# Patient Record
Sex: FEMALE | Race: WHITE | NOT HISPANIC OR LATINO | Employment: FULL TIME | URBAN - METROPOLITAN AREA
[De-identification: names, ages, dates, MRNs, and addresses within clinical notes are randomized per-mention and may not be internally consistent; named-entity substitution may affect disease eponyms.]

---

## 2017-06-29 ENCOUNTER — TRANSFERRED RECORDS (OUTPATIENT)
Dept: HEALTH INFORMATION MANAGEMENT | Facility: CLINIC | Age: 62
End: 2017-06-29

## 2017-07-22 ENCOUNTER — TRANSFERRED RECORDS (OUTPATIENT)
Dept: HEALTH INFORMATION MANAGEMENT | Facility: CLINIC | Age: 62
End: 2017-07-22

## 2017-08-09 ENCOUNTER — TRANSFERRED RECORDS (OUTPATIENT)
Dept: HEALTH INFORMATION MANAGEMENT | Facility: CLINIC | Age: 62
End: 2017-08-09

## 2017-10-10 ENCOUNTER — TRANSFERRED RECORDS (OUTPATIENT)
Dept: HEALTH INFORMATION MANAGEMENT | Facility: CLINIC | Age: 62
End: 2017-10-10

## 2018-09-28 ENCOUNTER — TRANSFERRED RECORDS (OUTPATIENT)
Dept: HEALTH INFORMATION MANAGEMENT | Facility: CLINIC | Age: 63
End: 2018-09-28

## 2019-04-22 ENCOUNTER — TRANSFERRED RECORDS (OUTPATIENT)
Dept: HEALTH INFORMATION MANAGEMENT | Facility: CLINIC | Age: 64
End: 2019-04-22

## 2019-10-28 ENCOUNTER — TRANSFERRED RECORDS (OUTPATIENT)
Dept: HEALTH INFORMATION MANAGEMENT | Facility: CLINIC | Age: 64
End: 2019-10-28

## 2020-04-09 PROBLEM — Z82.0 FAMILY HISTORY OF PARKINSONISM: Status: ACTIVE | Noted: 2020-04-09

## 2020-04-09 PROBLEM — D23.9 DYSPLASTIC NEVI: Status: ACTIVE | Noted: 2018-07-25

## 2020-04-09 PROBLEM — Z82.0 FAMILY HX OF ALS (AMYOTROPHIC LATERAL SCLEROSIS): Status: ACTIVE | Noted: 2020-04-09

## 2020-04-09 PROBLEM — Z81.8 FAMILY HISTORY OF DEMENTIA: Status: ACTIVE | Noted: 2020-04-09

## 2020-04-09 PROBLEM — Z83.79 FAMILY HISTORY OF ULCERATIVE COLITIS: Status: ACTIVE | Noted: 2020-04-09

## 2020-04-09 RX ORDER — SELEGILINE HYDROCHLORIDE 5 MG/1
5 TABLET ORAL
COMMUNITY
Start: 2019-04-22 | End: 2020-04-15

## 2020-04-09 RX ORDER — MAGNESIUM GLYCINATE 100 MG
1 TABLET ORAL
COMMUNITY
End: 2020-04-15

## 2020-04-09 RX ORDER — DULOXETIN HYDROCHLORIDE 60 MG/1
60 CAPSULE, DELAYED RELEASE ORAL DAILY
COMMUNITY
Start: 2019-08-07 | End: 2020-04-15

## 2020-04-09 RX ORDER — ACETAMINOPHEN 500 MG
500 TABLET ORAL
COMMUNITY
End: 2020-04-15

## 2020-04-09 RX ORDER — CARBIDOPA AND LEVODOPA 25; 100 MG/1; MG/1
1 TABLET ORAL
COMMUNITY
Start: 2020-03-09 | End: 2020-04-15

## 2020-04-09 RX ORDER — MELOXICAM 15 MG/1
15 TABLET ORAL
COMMUNITY
Start: 2019-07-05 | End: 2020-04-15

## 2020-04-09 RX ORDER — MECLIZINE HYDROCHLORIDE 25 MG/1
12.5 TABLET ORAL
COMMUNITY
End: 2020-04-15

## 2020-04-09 RX ORDER — GABAPENTIN 300 MG/1
600 CAPSULE ORAL 3 TIMES DAILY
COMMUNITY
Start: 2019-08-07 | End: 2020-04-15

## 2020-04-09 RX ORDER — GLUCOSAMINE/CHONDR SU A SOD 750-600 MG
TABLET ORAL
COMMUNITY
End: 2020-04-15

## 2020-04-09 NOTE — PROGRESS NOTES
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 No clear wearing off. She has had two falls in the past year.  No driving problems. No cognitive problems - slight memory changes. Has occasional constipation.   She has some bladder issues and has not had benefit from mirabregon and were considered and include pTNS, botox. Otherwise okay. Some speech changes - weak and hesitancy and some choking/swallowing problems.     (Z82.0) Family hx of ALS (amyotrophic lateral sclerosis)  - father Singh Biswas  11/[birth year]1933  (Z82.0) Family history of Parkinsonism father  (Z81.8) Family history of dementia father   (Z83.79) Family history of ulcerative colitis father    Plan:    Referral to Pat Stark, Pharm D - April 30 2020  Consider Genetics Consultation with Nadir Kan about MAPT, progranulin, [id number]  Parkinson 101 class with Edith Flynn  PT/Speech  [email or web address]    Talked about rock steady boxing    Had questions on fish oil, duloxetine/rasagiline, etc.     Pushed exercise.    Pain management meds coming from her pcp - for gabapentin and duloxetine      On 15th April time spent with patient from 1130am - 1145am = 15minutes    I have reviewed the note as documented above.  This accurately captures the substance of my conversation with the patient.  Patient contact time  60  minutes. Over 50% of this visit was spent in patient care and care coordination.     Damien  "Le GARCIA      ------------------------------------------------------------------------------------------------------------------------------------------------------------------------    Video-Visit Details    The patient has been notified of following:     \"After a review of the patient s situation, this visit was changed from an in-person visit to a video visit to reduce the risk of COVID-19 exposure.   The patient is being evaluated via a billable video visit.\"    \"This video visit will be conducted via a call between you and your physician/provider. We have found that certain health care needs can be provided without the need for an in-person physical exam.  This service lets us provide the care you need with a video conversation.  If a prescription is necessary we can send it directly to your pharmacy.  If lab work is needed we can place an order for that and you can then stop by our lab to have the test done at a later time.    If during the course of the call the physician/provider feels a video visit is not appropriate, you will not be charged for this service.\"     Patient has given verbal consent for Video visit? Yes    Patient would like the video invitation sent by: Text to cell phone: 0    Type of service:  Video Visit    Video Start Time: 0    Video End Time (time video stopped): 0    Duration:     Originating Location (pt. Location): Home    Distant Location (provider location):  [facility]     Mode of Communication:  Video Conference via Arnoldo Lujan MD      --------------------------------------------------------------------------------------------------------------    Ne Padilla is a 64 year old female who is being evaluated via a billable video visit.      Charts reviewed  Consult from  Images reviewed        I have reviewed and updated the patient's Past Medical History, Social History, Family History and Medication List.    ALLERGIES  Ciprofloxacin; " Hydrocodone-acetaminophen; Animal dander; No clinical screening - see comments; Prochlorperazine; and Cephalexin    Lasts visit details if there was a last visit:     Has seen Dr. Bishop in th past   Diagnosed August 2017  Symptoms bgan 15 to 1  Years prior to that  Left side onset  Right handed -   Officially retiring June 2020    Walks daily  She has been doing toñito warm yoga 2-3 times per week    Does lower back exercises daily    Has back and neck issues and  Taking gabapentin and duloxetine     Antidepressants allowed in all phase III azilect trials  escitalopram (lexapro) < 10mg/day  Citalopram (celexa) < 20mg/day  amitryptyline <50mg/day  Paroxetine <30mg/day  Trazodone <100mg/day  Sertraline <100mg/day    Contraindicated with meperidine, tramadol, methadone, propoxyphene, dextromethorphan, Lyn's Wort, cyclobenzaprine, other Maos    Caution with ciprofloxacin    Side effects: dyskinesias, weight loss, postural hypotension, vomiting, anorexia, constipation, abdominal pain, nausea, dry mouth, rash, abnormal dreams, falls, arthralgias.           Medications     8am noon 330pm 930pm    Acetaminophen tylenol 500mg  As needed       Calcium Carbonate 600 vitamin D 400  1      Carbidopa/levodopa 25/100 sinemet 1  1 1    Cholecalciferol vitamin D3 4000 units  1      Duloxetine cymbalta 60mg    1    Fish oil-3 fatty acids 100mg  1      Gabapentin neurontin 300mg 2  2 2    Glucosamine-chondroitin  1  1    Ibuprofen advil 200mg As needed       Meclizine antivert 25mg    1/2    Meloxicam mobic 15mg        Nonformulary magnesium glycinate 150mg 1  1     Tizanidine zanaflex 4mg     1/2                                                                                              14 Review of systems  are negative except for   Patient Active Problem List   Diagnosis     Family hx of ALS (amyotrophic lateral sclerosis)     Family history of Parkinsonism     Family history of dementia     Family history of ulcerative colitis  "    BPPV (benign paroxysmal positional vertigo)     Dysplastic nevi     Intervertebral cervical disc disorder with myelopathy, cervical region     Parkinson's disease (H)        Allergies   Allergen Reactions     Ciprofloxacin Rash     Hydrocodone-Acetaminophen Other (See Comments)     nightmares     Animal Dander Difficulty breathing     \"sneezing\"     No Clinical Screening - See Comments Difficulty breathing     \"sneezing to melon & sweet corn\"     Prochlorperazine Other (See Comments)     \" tongue goes in & out & my stomach contracts\"     Cephalexin Rash     Past Surgical History:   Procedure Laterality Date     APPENDECTOMY       NECK SURGERY      cervical laminectomy C56     Past Medical History:   Diagnosis Date     Family history of dementia 4/9/2020     Family history of Parkinsonism 4/9/2020     Family history of ulcerative colitis 4/9/2020     Family hx of ALS (amyotrophic lateral sclerosis) 4/9/2020     Social History     Socioeconomic History     Marital status:      Spouse name: Not on file     Number of children: Not on file     Years of education: Not on file     Highest education level: Not on file   Occupational History     Not on file   Social Needs     Financial resource strain: Not on file     Food insecurity     Worry: Not on file     Inability: Not on file     Transportation needs     Medical: Not on file     Non-medical: Not on file   Tobacco Use     Smoking status: Not on file   Substance and Sexual Activity     Alcohol use: Not on file     Drug use: Not on file     Sexual activity: Not on file   Lifestyle     Physical activity     Days per week: Not on file     Minutes per session: Not on file     Stress: Not on file   Relationships     Social connections     Talks on phone: Not on file     Gets together: Not on file     Attends Voodoo service: Not on file     Active member of club or organization: Not on file     Attends meetings of clubs or organizations: Not on file     " Relationship status: Not on file     Intimate partner violence     Fear of current or ex partner: Not on file     Emotionally abused: Not on file     Physically abused: Not on file     Forced sexual activity: Not on file   Other Topics Concern     Not on file   Social History Narrative     Not on file     Family History   Problem Relation Age of Onset     Cancer Mother         oral     Tremor Mother      Parkinsonism Mother      Neurologic Disorder Father      ALS Father      Parkinsonism Father      Dementia Father      Ulcerative Colitis Father      Current Outpatient Medications   Medication Sig Dispense Refill     carbidopa-levodopa (SINEMET)  MG tablet Take 1 tablet by mouth       DULoxetine (CYMBALTA) 60 MG capsule Take 60 mg by mouth daily       gabapentin (NEURONTIN) 300 MG capsule Take 600 mg by mouth 3 times daily       meloxicam (MOBIC) 15 MG tablet Take 15 mg by mouth       selegiline 5 MG tablet Take 5 mg by mouth       tiZANidine (ZANAFLEX) 4 MG tablet Take 4 mg by mouth       acetaminophen (TYLENOL) 500 MG tablet Take 500 mg by mouth       Calcium Carbonate-Vitamin D (CALCIUM 500 + D) 500-125 MG-UNIT TABS Take 1 tablet by mouth       Glucosamine HCl 1500 MG TABS        Magnesium Bisglycinate (MAG GLYCINATE) 100 MG TABS Take 1 tablet by mouth       meclizine (ANTIVERT) 25 MG tablet Take 12.5 mg by mouth       OMEGA-3 FATTY ACIDS PO            Examination  Alert oriented  Able to recall  3/3  Able to spell world backwards  5/5  Cranial nerves  Pupils  Face good expression and voice volume  Voice  scm okay. Tongue and facial expression fine  Arms/legs -a ppearance, strength  Coordination normal  Gait/station  normal      Surgical History    Surgery Date Site/Laterality Comments   HX APPENDECTOMY 1/1/1979 - 12/31/1979        LAMINECTOMY,CERVICAL     C5-6       Medical History    Medical History Date Comments   Cervical spinal stenosis       Post-menopause on HRT (hormone replacement therapy)        Vertigo       Osteoarthritis         Family History    Medical History Relation Name Comments   Other Disease Father   ALS & Lewy body/ulcerative colitis   Cancer Mother   oral, familial tremor, ggpa-Parkinson's   Breast Cancer Neg Family Hx         Relation Name Status Comments   Father        Mother   Alive       Social History    Tobacco Use Types Packs/Day Years Used Date   Never Smoker           Smokeless Tobacco: Never Used           Tobacco Cessation: Counseling Given: Yes  Comments: never     Alcohol Use Drinks/Week oz/Week Comments   Yes     1-3/wk     Sex Assigned at Birth Date Recorded   Not on file

## 2020-04-15 RX ORDER — GABAPENTIN 300 MG/1
CAPSULE ORAL
Qty: 540 CAPSULE | Refills: 3 | COMMUNITY
Start: 2020-04-15 | End: 2021-10-25

## 2020-04-15 RX ORDER — DULOXETIN HYDROCHLORIDE 60 MG/1
CAPSULE, DELAYED RELEASE ORAL
Qty: 90 CAPSULE | Refills: 3 | COMMUNITY
Start: 2020-04-15 | End: 2021-10-25

## 2020-04-15 RX ORDER — HYDROCORTISONE ACETATE 0.5 %
CREAM (GRAM) TOPICAL
COMMUNITY
Start: 2020-04-15 | End: 2021-10-25

## 2020-04-15 RX ORDER — MELOXICAM 15 MG/1
15 TABLET ORAL DAILY PRN
COMMUNITY
Start: 2020-04-15 | End: 2024-07-12

## 2020-04-15 RX ORDER — QUINIDINE SULFATE 200 MG
TABLET ORAL
COMMUNITY
Start: 2020-04-15 | End: 2020-04-15

## 2020-04-15 RX ORDER — CARBIDOPA AND LEVODOPA 25; 100 MG/1; MG/1
TABLET ORAL
Qty: 270 TABLET | Refills: 11 | COMMUNITY
Start: 2020-04-15 | End: 2020-04-20

## 2020-04-15 RX ORDER — MECLIZINE HYDROCHLORIDE 25 MG/1
TABLET ORAL
Qty: 45 TABLET | Refills: 3 | COMMUNITY
Start: 2020-04-15 | End: 2021-07-19

## 2020-04-15 RX ORDER — CHLORAL HYDRATE 500 MG
CAPSULE ORAL
COMMUNITY
Start: 2020-04-15 | End: 2021-01-19

## 2020-04-15 RX ORDER — IBUPROFEN 200 MG
400 TABLET ORAL EVERY 4 HOURS PRN
COMMUNITY
Start: 2020-04-15 | End: 2021-01-19

## 2020-04-15 RX ORDER — ACETAMINOPHEN 500 MG
1000 TABLET ORAL DAILY PRN
COMMUNITY
Start: 2020-04-15

## 2020-04-15 RX ORDER — QUINIDINE SULFATE 200 MG
TABLET ORAL
COMMUNITY
Start: 2020-04-15 | End: 2020-04-30

## 2020-04-20 ENCOUNTER — VIRTUAL VISIT (OUTPATIENT)
Dept: NEUROLOGY | Facility: CLINIC | Age: 65
End: 2020-04-20
Payer: COMMERCIAL

## 2020-04-20 DIAGNOSIS — F80.0 ARTICULATION DISORDER: ICD-10-CM

## 2020-04-20 DIAGNOSIS — Z82.0 FAMILY HISTORY OF NEUROLOGIC DISORDER: ICD-10-CM

## 2020-04-20 DIAGNOSIS — Z82.0 FAMILY HISTORY OF PARKINSONISM: ICD-10-CM

## 2020-04-20 DIAGNOSIS — Z81.8 FAMILY HISTORY OF DEMENTIA: ICD-10-CM

## 2020-04-20 DIAGNOSIS — G20.C PARKINSONISM, UNSPECIFIED PARKINSONISM TYPE (H): Primary | ICD-10-CM

## 2020-04-20 DIAGNOSIS — Z83.79 FAMILY HISTORY OF ULCERATIVE COLITIS: ICD-10-CM

## 2020-04-20 DIAGNOSIS — Z82.0 FAMILY HX OF ALS (AMYOTROPHIC LATERAL SCLEROSIS): ICD-10-CM

## 2020-04-20 PROCEDURE — 99205 OFFICE O/P NEW HI 60 MIN: CPT | Mod: GT | Performed by: PSYCHIATRY & NEUROLOGY

## 2020-04-20 RX ORDER — CARBIDOPA AND LEVODOPA 25; 100 MG/1; MG/1
TABLET ORAL
Qty: 270 TABLET | Refills: 11 | Status: SHIPPED | OUTPATIENT
Start: 2020-04-20 | End: 2020-07-20

## 2020-04-20 RX ORDER — CARBIDOPA AND LEVODOPA 25; 100 MG/1; MG/1
TABLET ORAL
Qty: 270 TABLET | Refills: 11 | Status: SHIPPED | OUTPATIENT
Start: 2020-04-20 | End: 2020-04-20

## 2020-04-20 NOTE — NURSING NOTE
Ne Padilla's goals for this visit include: recheck  She requests these members of her care team be copied on today's visit information:     PCP: No primary care provider on file.    Referring Provider:  No referring provider defined for this encounter.    There were no vitals taken for this visit.    Do you need any medication refills at today's visit? yes

## 2020-04-20 NOTE — LETTER
4/20/2020       RE: Ne Padilla  [address]   [facility] [address]     Dear Colleague,    Thank you for referring your patient, Ne Padilla, to the [facility]. Please see a copy of my visit note below.        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 No clear wearing off. She has had two falls in the past year.  No driving problems. No cognitive problems - slight memory changes. Has occasional constipation.   She has some bladder issues and has not had benefit from mirabregon and were considered and include pTNS, botox. Otherwise okay. Some speech changes - weak and hesitancy and some choking/swallowing problems.     (Z82.0) Family hx of ALS (amyotrophic lateral sclerosis)  - father Singh Biswas  [birth year]1933  (Z82.0) Family history of Parkinsonism father  (Z81.8) Family history of dementia father   (Z83.79) Family history of ulcerative colitis father    Plan:    Referral to Pat Stark, Pharm D - April 30 2020  Consider Genetics Consultation with Nadir Kan about MAPT, progranulin, [id number]  Parkinson 101 class with Edith Flynn  PT/Speech  [email or web address]    Talked about rock steady boxing    Had questions on fish oil, duloxetine/rasagiline, etc.     Pushed exercise.    Pain management meds coming from her pcp - for gabapentin and duloxetine      On 15th April time spent with  "patient from 1130am - 1145am = 15minutes    I have reviewed the note as documented above.  This accurately captures the substance of my conversation with the patient.  Patient contact time  60  minutes. Over 50% of this visit was spent in patient care and care coordination.     Damien Lujan MD      ------------------------------------------------------------------------------------------------------------------------------------------------------------------------    Video-Visit Details    The patient has been notified of following:     \"After a review of the patient s situation, this visit was changed from an in-person visit to a video visit to reduce the risk of COVID-19 exposure.   The patient is being evaluated via a billable video visit.\"    \"This video visit will be conducted via a call between you and your physician/provider. We have found that certain health care needs can be provided without the need for an in-person physical exam.  This service lets us provide the care you need with a video conversation.  If a prescription is necessary we can send it directly to your pharmacy.  If lab work is needed we can place an order for that and you can then stop by our lab to have the test done at a later time.    If during the course of the call the physician/provider feels a video visit is not appropriate, you will not be charged for this service.\"     Patient has given verbal consent for Video visit? Yes    Patient would like the video invitation sent by: Text to cell phone: 0    Type of service:  Video Visit    Video Start Time: 0    Video End Time (time video stopped): 0    Duration:     Originating Location (pt. Location): Home    Distant Location (provider location):  [facility]     Mode of Communication:  Video Conference via [employer]      Damien Tuite MD      --------------------------------------------------------------------------------------------------------------    Ne Cummins " year old female who is being evaluated via a billable video visit.      Charts reviewed  Consult from  Images reviewed        I have reviewed and updated the patient's Past Medical History, Social History, Family History and Medication List.    ALLERGIES  Ciprofloxacin; Hydrocodone-acetaminophen; Animal dander; No clinical screening - see comments; Prochlorperazine; and Cephalexin    Lasts visit details if there was a last visit:     Has seen Dr. Bishop in th past   Diagnosed August 2017  Symptoms bgan 15 to 1  Years prior to that  Left side onset  Right handed -   Officially retiring June 2020    Walks daily  She has been doing toñito warm yoga 2-3 times per week    Does lower back exercises daily    Has back and neck issues and  Taking gabapentin and duloxetine     Antidepressants allowed in all phase III azilect trials  escitalopram (lexapro) < 10mg/day  Citalopram (celexa) < 20mg/day  amitryptyline <50mg/day  Paroxetine <30mg/day  Trazodone <100mg/day  Sertraline <100mg/day    Contraindicated with meperidine, tramadol, methadone, propoxyphene, dextromethorphan, Lyn's Wort, cyclobenzaprine, other Maos    Caution with ciprofloxacin    Side effects: dyskinesias, weight loss, postural hypotension, vomiting, anorexia, constipation, abdominal pain, nausea, dry mouth, rash, abnormal dreams, falls, arthralgias.           Medications     8am noon 330pm 930pm    Acetaminophen tylenol 500mg  As needed       Calcium Carbonate 600 vitamin D 400  1      Carbidopa/levodopa 25/100 sinemet 1  1 1    Cholecalciferol vitamin D3 4000 units  1      Duloxetine cymbalta 60mg    1    Fish oil-3 fatty acids 100mg  1      Gabapentin neurontin 300mg 2  2 2    Glucosamine-chondroitin  1  1    Ibuprofen advil 200mg As needed       Meclizine antivert 25mg    1/2    Meloxicam mobic 15mg        Nonformulary magnesium glycinate 150mg 1  1     Tizanidine zanaflex 4mg     1/2                                                                         "                      14 Review of systems  are negative except for   Patient Active Problem List   Diagnosis     Family hx of ALS (amyotrophic lateral sclerosis)     Family history of Parkinsonism     Family history of dementia     Family history of ulcerative colitis     BPPV (benign paroxysmal positional vertigo)     Dysplastic nevi     Intervertebral cervical disc disorder with myelopathy, cervical region     Parkinson's disease (H)        Allergies   Allergen Reactions     Ciprofloxacin Rash     Hydrocodone-Acetaminophen Other (See Comments)     nightmares     Animal Dander Difficulty breathing     \"sneezing\"     No Clinical Screening - See Comments Difficulty breathing     \"sneezing to melon & sweet corn\"     Prochlorperazine Other (See Comments)     \" tongue goes in & out & my stomach contracts\"     Cephalexin Rash     Past Surgical History:   Procedure Laterality Date     APPENDECTOMY       NECK SURGERY      cervical laminectomy C56     Past Medical History:   Diagnosis Date     Family history of dementia 4/9/2020     Family history of Parkinsonism 4/9/2020     Family history of ulcerative colitis 4/9/2020     Family hx of ALS (amyotrophic lateral sclerosis) 4/9/2020     Social History     Socioeconomic History     Marital status:      Spouse name: Not on file     Number of children: Not on file     Years of education: Not on file     Highest education level: Not on file   Occupational History     Not on file   Social Needs     Financial resource strain: Not on file     Food insecurity     Worry: Not on file     Inability: Not on file     Transportation needs     Medical: Not on file     Non-medical: Not on file   Tobacco Use     Smoking status: Not on file   Substance and Sexual Activity     Alcohol use: Not on file     Drug use: Not on file     Sexual activity: Not on file   Lifestyle     Physical activity     Days per week: Not on file     Minutes per session: Not on file     Stress: Not on file "   Relationships     Social connections     Talks on phone: Not on file     Gets together: Not on file     Attends Orthodoxy service: Not on file     Active member of club or organization: Not on file     Attends meetings of clubs or organizations: Not on file     Relationship status: Not on file     Intimate partner violence     Fear of current or ex partner: Not on file     Emotionally abused: Not on file     Physically abused: Not on file     Forced sexual activity: Not on file   Other Topics Concern     Not on file   Social History Narrative     Not on file     Family History   Problem Relation Age of Onset     Cancer Mother         oral     Tremor Mother      Parkinsonism Mother      Neurologic Disorder Father      ALS Father      Parkinsonism Father      Dementia Father      Ulcerative Colitis Father      Current Outpatient Medications   Medication Sig Dispense Refill     carbidopa-levodopa (SINEMET)  MG tablet Take 1 tablet by mouth       DULoxetine (CYMBALTA) 60 MG capsule Take 60 mg by mouth daily       gabapentin (NEURONTIN) 300 MG capsule Take 600 mg by mouth 3 times daily       meloxicam (MOBIC) 15 MG tablet Take 15 mg by mouth       selegiline 5 MG tablet Take 5 mg by mouth       tiZANidine (ZANAFLEX) 4 MG tablet Take 4 mg by mouth       acetaminophen (TYLENOL) 500 MG tablet Take 500 mg by mouth       Calcium Carbonate-Vitamin D (CALCIUM 500 + D) 500-125 MG-UNIT TABS Take 1 tablet by mouth       Glucosamine HCl 1500 MG TABS        Magnesium Bisglycinate (MAG GLYCINATE) 100 MG TABS Take 1 tablet by mouth       meclizine (ANTIVERT) 25 MG tablet Take 12.5 mg by mouth       OMEGA-3 FATTY ACIDS PO            Examination  Alert oriented  Able to recall  3/3  Able to spell world backwards  5/5  Cranial nerves  Pupils  Face good expression and voice volume  Voice  scm okay. Tongue and facial expression fine  Arms/legs -a ppearance, strength  Coordination normal  Gait/station  normal      Surgical  History    Surgery Date Site/Laterality Comments   HX APPENDECTOMY 1979 - 1979        LAMINECTOMY,CERVICAL     C5-6       Medical History    Medical History Date Comments   Cervical spinal stenosis       Post-menopause on HRT (hormone replacement therapy)       Vertigo       Osteoarthritis         Family History    Medical History Relation Name Comments   Other Disease Father   ALS & Lewy body/ulcerative colitis   Cancer Mother   oral, familial tremor, ggpa-Parkinson's   Breast Cancer Neg Family Hx         Relation Name Status Comments   Father        Mother   Alive       Social History    Tobacco Use Types Packs/Day Years Used Date   Never Smoker           Smokeless Tobacco: Never Used           Tobacco Cessation: Counseling Given: Yes  Comments: never     Alcohol Use Drinks/Week oz/Week Comments   Yes     1-3/wk     Sex Assigned at Birth Date Recorded   Not on file        Again, thank you for allowing me to participate in the care of your patient.      Sincerely,    Damien Lujan MD

## 2020-04-20 NOTE — PATIENT INSTRUCTIONS
It looks like there are a number of webinars and online support groups at this website for patients who are interested:    [email or web address]/[email or web address]       APDTYSON Let s Keep Moving Fitness Series: The [facility] Parkinson s Disease at [facility] will be hosting live fitness-focused sessions on Wednesdays at 1pm ET. These will be short (approximately 20 minutes) sessions that offer education/instruction and the opportunity for the public to ask questions. These will be available via Zoom, and also livestreamed to [facility] s Facebook page. We will promote and encourage registration via social media.                The schedule is currently as follows:         April 8: Be Active at Home: Exercise tips for people with PD  April 15: How to Stay Motivated for Physical Activity with Social Distancing  April 22: Setting up for Success at Home: Creating an Exercise-Friendly Area  April 29: Stepping into Spring: Keep your step count up with a walking program              Registration link: CLICK HERE TO REGISTER              Meeting ID: [phone]         [facility] s [employer] channel    Archived classes that feature stephany chi, yoga, interval training, kickboxing, chair exercises and more.  Additional classes are being filmed nearly every day.  New classes will also be livestreamed to the [employer] channel.       Dance for PD    Specialized dance classes hosted by the renowned [employer]  Archived classes available  Also host Sing for PD classes as well  Check out the schedule and links for the live online classes       Stephany Chi for PD    [facility] has worked with Geovanna Costa,  and principal instructor of [employer], on various programs for people with PD.  Although they usually take place in person, Geovanna will deliver the classes remotely on a weekly basis until it is safe for the community to gather in person. There is time built  into each session for the students to connect and share. These free online classes are open to all no matter where you live.    Mondays, 1:00 -2:00 PM:  Starting Monday March 30, 2020 Wednesdays, 11:30 - 12-30 PM: Starting April 1, 2020    To [place]:  Contact Geovanna Costa at [phone] or [email or web address]         Parkinson Voice Project    An effective program that combines education, individual speech therapy (SPEAK OUT!), and ongoing group sessions ([employer]).  SPEAK OUT! home practice videos available online       It would also be helpful to direct PD patients to the excellent and frequently updated FAQ list about PD and COVID-19, by Dr. Ira Bhakta - [email or web address]/[email or web address]-weelz-tm-[email or web address]-[email or web address]/.      Edith Casey, MEKAN, RN, PHN, BA, HNB-BC  Clinical : Deep Brain Stimulation  Movement Disorder Nurse Care Coordinator  [facility] Neurology [facility]  [address] [place] [id number]   Ph: [phone]  Fax: [phone]  Employed by [facility] [employer]    [email or web address]        Assessment:  (G20) Parkinsonism, unspecified Parkinsonism type (H)  (primary encounter diagnosis)  She has mild dyskinesias. No clear wearing off. She has had two falls in the past year.  No driving problems. No cognitive problems - slight memory changes. Has occasional constipation.   She has some bladder issues and has not had benefit from mirabregon and were considered and include pTNS, botox. Otherwise okay. Some speech changes - weak and hesitancy and some choking/swallowing problems.     (Z82.0) Family hx of ALS (amyotrophic lateral sclerosis)  - father Singh Biswas  [birth year]1933  (Z82.0) Family history of Parkinsonism father  (Z81.8) Family history of dementia father   (Z83.79) Family history of ulcerative colitis father    Plan:    Referral to Pat Stark, Pharm D -  April 30 2020  Consider Genetics Consultation with Nadir Kan about MAPT, progranulin, [id number]  Parkinson 101 class with Edith Flynn  PT/Speech  [email or web address]    Talked about rock steady boxing    Had questions on fish oil, duloxetine/rasagiline, etc.     Pushed exercise.    Pain management meds coming from her pcp - for gabapentin and duloxetine

## 2020-04-21 ENCOUNTER — TELEPHONE (OUTPATIENT)
Dept: NEUROLOGY | Facility: CLINIC | Age: 65
End: 2020-04-21

## 2020-04-21 NOTE — TELEPHONE ENCOUNTER
GENETIC COUNSELING-Neurology  I spoke with Ne Padilla at the request of Dr. Damien Lujan. She has a significant family history of neurologic disease and she was referred to review family history and discuss genetic testing options. I reached her by phone and she indicated that she would prefer to have an in-person visit as the situation with COVID 19 allows and since this is not urgent, would like to try to defer for a few months until it might be more possible to have an in-person visit. I gave her my telephone number and email to follow up    Nadir Kan MS, formerly [facility]  Licensed Genetic Counselor

## 2020-04-28 ENCOUNTER — HOSPITAL ENCOUNTER (OUTPATIENT)
Dept: PHYSICAL THERAPY | Facility: CLINIC | Age: 65
Setting detail: THERAPIES SERIES
End: 2020-04-28
Attending: PSYCHIATRY & NEUROLOGY
Payer: COMMERCIAL

## 2020-04-28 DIAGNOSIS — G20.C PARKINSONISM, UNSPECIFIED PARKINSONISM TYPE (H): ICD-10-CM

## 2020-04-28 DIAGNOSIS — F80.0 ARTICULATION DISORDER: ICD-10-CM

## 2020-04-28 PROCEDURE — 97112 NEUROMUSCULAR REEDUCATION: CPT | Mod: GP,95 | Performed by: PHYSICAL THERAPIST

## 2020-04-28 PROCEDURE — 97161 PT EVAL LOW COMPLEX 20 MIN: CPT | Mod: GP,GT | Performed by: PHYSICAL THERAPIST

## 2020-04-28 NOTE — PROGRESS NOTES
Ne Padilla is a 64 year old female who is being seen via a billable video visit.      Patient has given verbal consent for Video visit? Yes    Video Start Time: 1:00 PM    Telehealth Visit Details    Type of Service:  Telehealth    Video End Time (time video stopped): 1:46 M    Originating Location (pt. location): Home    Additional Participants in Telehealth Visit: , Mark present    Distant Location (provider location):  [facility]     Mode of Communication (Audio Visual or Audio Only):  Audio visual    Aspen Collins, PT  April 28, 2020

## 2020-04-28 NOTE — PROGRESS NOTES
04/28/20 1200   Quick Adds   Type of Visit Initial OP PT Evaluation   General Information   Start of Care Date 04/28/20   Referring Physician Damien Lujan MD   Orders Evaluate and Treat as Indicated   Order Date 04/15/20   Medical Diagnosis Parkinson's Disease   Onset of illness/injury or Date of Surgery 08/01/17   Surgical/Medical history reviewed Yes   Pertinent history of current problem (include personal factors and/or comorbidities that impact the POC) History of cervical pain with C4-7 laminectomy in 2010, ongoing LBP currently seeing a chiropractor 1x week.  Diagnosed with PD in August of 2017.  She experiences muscle tightness in her left leg, and stiffened movemnet patterns.  She has had vertigo episodes and take Meclizine daily with inital epiosdes in [id number].  She reports her left side is more impacted compated to her right.  She has Right foot bunions.  She reports fatigue has been increasing and reports a history of falls.   Previous/Current Treatment Physical Therapy;Chiropractic;Medication(s)   Improvement after PT Significant  (Previous PT for C spine)   Improvement after Chiropractic Tx Significant  (Weekly)   Improvement after medication Significant  (Sinemet)   Patient role/Employment history Retired  (She will officially retire in June and is currently on leave)   Living environment House/[facility]   Home/Community Accessibility Comments Lives with her  in walk out [facility], no concerns with stairs, helps care for grandchildren.   Assistive Devices Comments None   Patient/Family Goals Statement She would like to work on her left leg tightness, overall strengthen and balance to reduce falls and improve mobility.   General Information Comments Fatigue daily with laying down to rest for about 30-60 minutes daily.   Fall Risk Screen   Fall screen completed by PT   Have you fallen 2 or more times in the past year? Yes   Have you fallen and had an injury in the past year? No   Timed Up and Go  score (seconds) NT   Is patient a fall risk? Yes   Pain   Patient currently in pain Yes   Pain location LBP   Pain rating 3/10   Pain description Ache   Pain comments Pain increases with bending over or getting up from the floor   Cognitive Status Examination   Orientation orientation to person, place and time   Cognitive Comment Reports work recall difficulty at times that is progressing   Posture   Posture Normal   Posture Comments Limited cervical mobility and demonstrates stiffened posture   Range of Motion (ROM)   ROM Comment WFLs, muscle tightness through her left LE, buttocks and left hip area   Strength   Strength Comments Unable to formally assess, able to get up from the floor with UE support on stable object.   Bed Mobility   Bed Mobility Comments Reports independence and no concerns   Transfer Skills   Transfer Comments Minimal use of UEs for balance   Gait   Gait Gait Analysis   Gait Comments Heel to toe gait is demonstrated with even step length and does demonstrate reciprocal arm swing for short distance of 50 feet. She walks 2.5 miles in 40 minutes.   Gait Analysis   Gait Pattern Used swing-through gait   Gait Deviations Noted decreased step length;decreased weight-shifting ability   Impairments Contributing to Gait Deviations impaired balance;decreased flexibility;pain   Balance   Balance other (describe)   Balance Comments Reports needing to steady herself when standing longer then 2 minutes.  Can feel dizzy with quick head turns.   Balance Special Tests   Balance Special Tests Sit to stand reps   Balance Special Tests Sit to Stand Reps in 30 Seconds   Reps in 30 seconds 12   Height 18-19   Comments Arms crossed at chest   Sensory Examination   Sensory Perception other (describe)   Sensory Perception Comments Reports occasional tingling/numbness in Right hand   Muscle Tone   Muscle Tone Comments NT   Planned Therapy Interventions   Planned Therapy Interventions balance training;gait  training;neuromuscular re-education;ROM;strengthening;stretching;transfer training   Clinical Impression   Criteria for Skilled Therapeutic Interventions Met yes, treatment indicated   PT Diagnosis Weakness, impaired balance with falls, LBP   Influenced by the following impairments Fatigue, LBP, vertigo, impaired balance and left sided weakness, impaired mobility   Functional limitations due to impairments Fatigue with walking community distances, imparied balance with falls and intermittent dizziness with head turns, requires daily rest periods, decreased ability to care for grandchildren   Clinical Presentation Evolving/Changing   Clinical Presentation Rationale Progression of PD, symptom report, clinical presentation   Clinical Decision Making (Complexity) Low complexity   Therapy Frequency 1 time/week   Predicted Duration of Therapy Intervention (days/wks) 8 weeks   Risk & Benefits of therapy have been explained Yes   Patient, Family & other staff in agreement with plan of care Yes   Clinical Impression Comments Ne presents with increasing left leg weakness and muscle tightness, LBP pain, intermittent dizziness with quick head turns, fatigue limiting functional mobility and a hisotry of falls.  She will benefit from skilled PT to incorporate amplitude training techiniques to assist with improving mobility and reducing her risk for falls.   GOALS   PT Eval Goals 1;2;3   Goal 1   Goal Identifier Gardening   Goal Description Ne will report being able to garden for 45 minute intervals with tolerable fatigue 4x week and report increased ease of getting up and down from a squatting position.   Target Date 06/28/20   Goal 2   Goal Identifier Walking for aerobic activity   Goal Description Ne will walk 3 miles or more in 40 minutes on a daily basis for aerobic activity with tolerable fatigue with no left leg pain.   Target Date 06/28/20   Goal 3   Goal Identifier Amplitude training   Goal Description Ne will  demonstrate amplitude training techniques with gait and transfer patterns for improved functional safety on a consistent basis and report no falls in a two month time frame.   Target Date 06/28/20   Total Evaluation Time   PT Reji, Low Complexity Minutes (97615) 30

## 2020-04-30 ENCOUNTER — ALLIED HEALTH/NURSE VISIT (OUTPATIENT)
Dept: PHARMACY | Facility: CLINIC | Age: 65
End: 2020-04-30
Payer: COMMERCIAL

## 2020-04-30 DIAGNOSIS — G20.A1 PARKINSON'S DISEASE (H): Primary | ICD-10-CM

## 2020-04-30 DIAGNOSIS — H81.10 BENIGN PAROXYSMAL POSITIONAL VERTIGO, UNSPECIFIED LATERALITY: ICD-10-CM

## 2020-04-30 DIAGNOSIS — Z78.9 TAKES DIETARY SUPPLEMENTS: ICD-10-CM

## 2020-04-30 DIAGNOSIS — M50.00 INTERVERTEBRAL CERVICAL DISC DISORDER WITH MYELOPATHY, CERVICAL REGION: ICD-10-CM

## 2020-04-30 PROCEDURE — 99607 MTMS BY PHARM ADDL 15 MIN: CPT | Performed by: PHARMACIST

## 2020-04-30 PROCEDURE — 99605 MTMS BY PHARM NP 15 MIN: CPT | Performed by: PHARMACIST

## 2020-04-30 NOTE — PROGRESS NOTES
"[facility] ENCOUNTER  SUBJECTIVE/OBJECTIVE:                           Ne Padilla is a 64 year old female called for an initial visit. She was referred to me from Dr. Lujan.      Patient consented to a telehealth visit: yes  Telemedicine Visit Details  Type of service:  Telephone visit  Start Time: 10:05 am  End Time: 10:53 AM  Originating Location (pt. Location): Home  Distant Location (provider location):  [facility]  Mode of Communication:  Telephone    Chief Complaint: medication review    Allergies/ADRs: Reviewed in Epic  Tobacco:  reports that she has never smoked. She has never used smokeless tobacco.  Alcohol: 1-3 beverages / week (wine)  Caffeine: 2 cups/day of half-caff coffee  Activity: 30-40 minutes walking/day  PMH: Reviewed in Epic    Medication Adherence/Access:   Patient uses pill box(es).  Patient takes medications 4 time(s) per day: 8 am, 1 pm, 3:30 pm, 9:30-10:30 pm.   Per patient, misses medication 0 times per week.   Medication barriers: none.     Of note, patient is planning to retire in June 2020. Her  is a pharmacist.     Parkinson's Disease:  Current medications include: Carbidopa-levodopa  mg 1 tablet 3 times/day.  This medication was a \"miracle\" when it was started 3 years ago and she feels for the most part symptoms are well controlled on this medication. They had considered the possibility of selegiline in the past but due to drug interaction with duloxetine, it was not started. She recently met with PT and is feeling encouraged that she might be able to manage her symptoms without an increase in PD medications in the near future.     Cervical disc disorder/spinal pain: Taking duloxetine 60 mg, gabapentin 600 mg 3 times/day, tizanidine 2 mg nightly, and occasional meloxicam (hasn't taken it in 3 months). She takes acetaminophen more than ibuprofen for breath-through painand she takes glucosamine twice daily for her joints. She feels that these pain medications " "have really been optimized and that she can function well on them. She is hopeful that she can continue to taper the tizanidine. Patient has done injections in the past which really didn't help. Chiropractor has been helpful and she would like to go back to getting massage therapy if there is someone who specializes in PD. She was doing a gentle yoga class but has been unable to due to COVID19. She is doing some gentle stretching and exercises at home now.     Vertigo: Taking meclizine 12.5 mg nightly. Vertigo symptoms started occurring in 2002 - really bad episode, she was vomiting for days. It took her 6 months to feel like she wasn't walking on a ship. She feels very \"normal\" now. She continues taking meclizine be, cause certain movements still set it off, especially at night when she is lying down. Elevating her head with pills helped the vertigo but aggravated her neck pain.    Vitamins/supplements: Taking calcium 600 mg/vitamin D3 400 units daily and vitamin D 4000 units daily (total of 4400 units of Vitamin D). She thought she had a DEXA in the past but does not recall if she has bone loss; she states she has a narrow frame. She takes magnesium glycinate twice a day - this was recommended by chiropractor for muscles. Finally, she takes fish oil daily for heart health and family history of CV disease. She eats fish once a week.    Today's Vitals: There were no vitals taken for this visit.  (telemedicine visit)    ASSESSMENT:                            Medication Adherence: excellent, no issues identified    Parkinson's Disease:  Stable. Discussed that MAOB inhibitors (ie: selegiline) cannot be used with duloxetine due to risk of serotonin syndrome but that if she opts to go off duloxetine in the future, a MAOB inhibitor could be an option for her. Will follow up with patient with resources on massage and yoga.     Cervical disc disorder/Spinal pain: Appears stable. I agree that it would be beneficial for the " patient to try tapering the tizanidine as it can cause side effects like drowsiness and confusion. She will continue to work towards tapering off of it and using it only as needed, if able.    Vertigo: Needs improvement.  Although the meclizine has been very effective for her vertigo symptoms, I am concerned about the anticholinergic effects with chronic use.  We discussed that meclizine is on the Beer's list and should preferably be avoided in older adults given the risk of falls and cognitive impairment.  She will consider tapering off of it and consider using it only as needed for vertigo episodes.    Vitamins/supplements: Needs improvement.  I recommended that the patient pursue a DEXA scan later this year as it will be covered by her Medicare insurance when she turns 65 years old.  Then her dose of calcium and vitamin D to be reassessed.  Additionally, there does not appear to be an indication for fish oil and she seems to be eating enough fish in her diet so I think she could stop the fish oil supplement.      PLAN:                            1. Encouraged patient to look into online yoga classes and to see if her  is on [employer].  2. Encouraged patient to consider massage therapy again as it seemed to be helpful. Our team is unaware of anyone who specializes in PD but she could call the Parkinson's foundation or [employer]DA to see if they have any recommendations.    3. Eventually would recommend that she taper off meclizine and tizanidine if possible and potentially only use these as needed due to risk of cognitive impairment and falls.  4. Would recommend a DEXA scan in the fall once she is on Medicare.   5. Could consider stopping the fish oil supplement.     I spent 50 minutes with this patient today. All changes were made via collaborative practice agreement with Dr. Lujan. A copy of the visit note was provided to the patient's referring provider.    Will follow up in 1 year or sooner if  needed.    The patient was sent via [employer] a summary of these recommendations.     Pat Stark, Pharm.D.  Medication Therapy Management Pharmacist  Phone: [phone]

## 2020-04-30 NOTE — PATIENT INSTRUCTIONS
Recommendations from today's MTM visit:                                                    MTM (medication therapy management) is a service provided by a clinical pharmacist designed to help you get the most of out of your medicines.     1. I would encourage you to look into online yoga classes. You can see if your  is on [employer].  2. I would encourage you to look into massage therapy again as it seemed to be helpful. Our team is unaware of anyone who specializes in Parkinson's specifically, but she you call the Parkinson's foundation or [facility] to see if they have any recommendations.  Silvana Reagan is at the [facility] and she is a great resource: [phone]  3. Eventually I would recommend that you consider tapering off meclizine and tizanidine if possible, and potentially only use these as needed due to risk of cognitive impairment, drowsiness, and falls  4. I would recommend a DEXA (bone) scan in the fall once you are on Medicare.   5. You could consider stopping the fish oil supplement as it seems you are getting enough fish in your diet.    It was great to speak with you today.  I value your experience and would be very thankful for your time with providing feedback on our clinic survey. You may receive a survey via email or text message in the next few days.     Next MTM visit: 1 year or sooner if needed    To schedule another MTM appointment, please call the clinic directly or you may call the MTM scheduling line at [phone] or toll-free at [phone].     My Clinical Pharmacist's contact information:                                                      It was a pleasure talking with you today!  Please feel free to contact me with any questions or concerns you have.      Pat Stark, Pharm.D.  Medication Therapy Management Pharmacist  Phone: [phone]

## 2020-05-01 ENCOUNTER — HOSPITAL ENCOUNTER (OUTPATIENT)
Dept: SPEECH THERAPY | Facility: CLINIC | Age: 65
Setting detail: THERAPIES SERIES
End: 2020-05-01
Attending: PSYCHIATRY & NEUROLOGY
Payer: COMMERCIAL

## 2020-05-01 DIAGNOSIS — F80.0 ARTICULATION DISORDER: ICD-10-CM

## 2020-05-01 DIAGNOSIS — G20.C PARKINSONISM, UNSPECIFIED PARKINSONISM TYPE (H): ICD-10-CM

## 2020-05-01 PROCEDURE — 92524 BEHAVRAL QUALIT ANALYS VOICE: CPT | Mod: GN,95 | Performed by: STUDENT IN AN ORGANIZED HEALTH CARE EDUCATION/TRAINING PROGRAM

## 2020-05-01 PROCEDURE — 92507 TX SP LANG VOICE COMM INDIV: CPT | Mod: GN,GT | Performed by: STUDENT IN AN ORGANIZED HEALTH CARE EDUCATION/TRAINING PROGRAM

## 2020-05-01 NOTE — PROGRESS NOTES
Ne Padilla is a 64 year old female who is being seen via a billable video visit.      Patient has given verbal consent for Video visit? Yes    Video Start Time: 10:59a    Telehealth Visit Details    Type of Service:  Telehealth    Video End Time (time video stopped): 11:49a    Originating Location (pt. location): Home    Additional Participants in Telehealth Visit: Spouse, for part of visit only    Distant Location (provider location):  [facility]     Mode of Communication (Audio Visual or Audio Only):  Audio Visual via [employer] Allison E. Alpers, SLP  May 1, 2020

## 2020-05-01 NOTE — PROGRESS NOTES
"[facility] S[facility] Voice Evaluation  05/01/20 1100   General Information   Type Of Visit Initial   Start Of Care Date 05/01/20   Referring Physician Damien Lujan MD  (Neurology)   Orders Evaluate And Treat   Medical Diagnosis Parkinsonism   Onset Of Illness/injury Or Date Of Surgery 04/20/20  (order date)   Precautions/Limitations  fall precautions  (Also seeing PT)   Hearing WFL for 1:1 conversation in session   Surgical/Medical history reviewed Yes   Pertinent History Of Current Problem 63yo female diagnosed with Parkinsonism in August 2017 presenting with voice and speech changes.  Pt reports that her voice is softer and more \"throaty.\"  Her voice will sound gravelly and hoarse, like something is stuck in the back of her throat.  Symptoms come and go, and can be worse during meals, sounding like food is stuck in her throat.  It sometimes takes extra effort to produce her speech.  She occasionally has difficulty with articulation.  Pt also reports dysphagia, noting that it feels like her \"throat is narrow\" and she can't get everything down.  She has been choking/gagging during meals about once every 1-2 days.  Please see chart for additional PMH.     Prior Level of Functioning No previous problems.   Patient Role/employment History Retired   Living environment [place]   General Observations Pt reports that today is a typical voice day.  Her  was present briefly during the evaluation.   Patient/family Goals To have and maintain a normal voice for as long as possible   General Information Comments Telehealth--see attached   Personal Rating / Voice Use Rating   Comments Vocal demands primarily consist of conversational speech.   Evaluation Results   Voice Observations VISIBLE TENSION: neck.   Voice Profile during conversation, 1 min monologue and paragraph reading   Voice Quality Scratchy   Voice quality comments SPEECH: Consistent mild strain and intermittent mild " "roughness, particularly at the ends of sentences with loss of volume.  SINGING: improved compared to speech, with only minimal strain and no roughness.  THERAPY PROBES: improved voice quality and volume with flow mode and \"Think Loud\" probes.   Voice quality severity rating continuum (1=Severe, 7=WNL) 5  (CAPE-V Overall Severity: 23/100)   Breath control Tight   Breath Control comments Excessive thoracic muscle use pattern with intermittent neck involvement on inspiration for speech.  Inspirations for speech are occasionally shallow with poor respiratory/phonatory coordination.   Breath control severity rating continuum (1=Severe, 7=WNL) 5   Voice Use / Effort Contraction of neck muscles;Throat push   Voice Use / Effort comments Pt rates her current phonatory effort for speech as 5-6/10 (10 is maximum effort).   Voice use / Effort severity rating continuum (1=Severe, 7=WNL) 5   Fundamental frequency (Hz)   (Centered around F#3)   Pitch /Frequency Description WNL   Pitch / Frequency comments WNL habitual pitch, prosody/inflection, and pitch range.  Higher pitches are softer and weaker than the rest of her range.   Pitch / Frequency severity rating continuum (1=Severe, 7=WNL) 6   Average dB   (Unable to obtain measures d/t video visit limitations)   Volume Too quiet   Volume comments Volume for conversational speech is mildly produced, primarily at ends of sentences.  A whisper is normal.  Soft phonation is mildly strained and rough.  Loud phonation is strained, with modest volume increase.   Volume severity rating continuum (1=Severe, 7=WNL) 5   Neuromuscular Control Hypokinetic   Neuromuscular Control severity rating continuum (1=Severe, 7=WNL) 6   Neuromuscular comments Minimal hypokinetic dysarthria characterized by rapid/blurred and incoordinated labial/lingual SMRs.  Speech intelligibility for conversation is 100%.  Labial/lingual AMRs are also WFL.   Resonance WNL   Resonance severity rating continuum " (1=Severe, 7=WNL) 7   Comments Mild dysphonia and minimal hypokinetic dysarthria characterized by strain, roughness, reduced volume, poor respiratory/phonatory coordination, increased dysphonia at higher pitches, rapid/blurred glottal AMRs and labial/lingual SMRs, and increased phonatory effort with neck involvement during phonation.   Adduction /Abduction Function   Laryngeal diadokinetic speed   (Fast)   Laryngeal diadokinetic strength Weak   Laryngeal diadokinetic consistency Run together  (Intermittently)   Adduction / Abduction function scale Age 11 - 65, norm per sec:  5+   Vibratory Function of Vocal Folds   Prolonged 'ah' at mid pitch (sec) 10.6 seconds (average) at Ab3 with consistent strain and intermittent roughness   Prolonged 'ah' at high pitch (sec) 9.4 seconds at Ab4 with increased strain and breathiness   Vibratory Function of Vocal Folds Scale Females 20 - 80 yrs: 10 - 22 secs   Vibratory Function of Vocal Folds Comments Reduced in quality   Function of Lengtheners / Shorteners (CT and TA Muscles)   Pitch glides Upper pitches more dysphonic  (Lowest: C#3; Highest: F5)   General Therapy Interventions   Planned Therapy Interventions Voice   Voice Voice quality/pitch or volume tasks;Larynx strengthening;Larynx and TVF flexibility;LSVT generalized to community setting   Impressions and Recommendations   Communication Diagnosis Mild dysphonia and minimal hypokinetic dysarthria   Summary Ms. 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  Voice and speech symptoms are characterized by strain, roughness, reduced volume, poor respiratory/phonatory coordination, increased dysphonia at higher pitches, rapid/blurred glottal AMRs and labial/lingual SMRs, and increased phonatory effort with neck involvement during phonation.  Pt is highly motivated to participate in therapy.   Recommendations A course of skilled speech therapy  is recommended in order to improve vocal loudness, strength, and voice quality, so that patient is able to meet her daily vocal demands when speaking with friends and family.   Frequency and Duration 1x/week, every other week, for 6 sessions with 1-2 monthly follow-ups.  Consider LSVT LOUD program post COVID-19.   Prognosis  Good with intervention   Risks and Benefits of Treatment have been explained. Yes   Patient & /or Caregiver  in agreement with plan of care Yes   Patient Education SLP provided education regarding evaluation findings, common voice/speech/swallowing symptoms of PD, and treatment options.  Therapy initiated today.   Educational Assessment   Barriers to Learning No barriers   Preferred Learning Style Listening;Reading;Demonstration;Pictures / Video   Voice Goals   Voice Goals 1;2   Voice Goal 1   Goal Identifier Breath   Goal Description Patient will increase vocal loudness to an average SPL of >70 dB at 30 cm during sustained /a/ phonation for at least 14 seconds on average independently in order to increase vocal respiratory support required for functional communication.   Target Date 07/30/20   Voice Goal 2   Goal Identifier Conversation   Goal Description Patient will increase vocal loudness during narrative reading and semi-spontaneous conversation tasks to a minimum range of 70-75 dB at 30 cm given min cues in order to improve vocal loudness and voice quality for daily communication tasks.   Target Date 07/30/20   Total Session Time   Voice Minutes (50472) 35   Total Evaluation Time 50     Thank you for the referral of this patient.    Allison Alpers, B.A. (music), M.A., CCC-SLP  Speech-Language Pathologist  Certificate of Vocology  [facility]  [phone]

## 2020-05-05 ENCOUNTER — HOSPITAL ENCOUNTER (OUTPATIENT)
Dept: PHYSICAL THERAPY | Facility: CLINIC | Age: 65
Setting detail: THERAPIES SERIES
End: 2020-05-05
Attending: PSYCHIATRY & NEUROLOGY
Payer: COMMERCIAL

## 2020-05-05 PROCEDURE — 97112 NEUROMUSCULAR REEDUCATION: CPT | Mod: GP,GT | Performed by: PHYSICAL THERAPIST

## 2020-05-05 NOTE — PROGRESS NOTES
Ne Padilla is a 64 year old female who is being seen via a billable video visit.      Patient has given verbal consent for Video visit? Yes    Video Start Time: 1:45 PM    Telehealth Visit Details    Type of Service:  Telehealth    Video End Time (time video stopped): 2:32 PM    Originating Location (pt. location): Home    Additional Participants in Telehealth Visit: None    Distant Location (provider location):  [place] [facility]     Mode of Communication (Audio Visual or Audio Only):  Audio visual    Aspen Collins, PT  May 5, 2020

## 2020-05-12 ENCOUNTER — HOSPITAL ENCOUNTER (OUTPATIENT)
Dept: PHYSICAL THERAPY | Facility: CLINIC | Age: 65
Setting detail: THERAPIES SERIES
End: 2020-05-12
Attending: PSYCHIATRY & NEUROLOGY
Payer: COMMERCIAL

## 2020-05-12 PROCEDURE — 97112 NEUROMUSCULAR REEDUCATION: CPT | Mod: GP,GT | Performed by: PHYSICAL THERAPIST

## 2020-05-12 NOTE — PROGRESS NOTES
Ne Padilla is a 64 year old female who is being seen via a billable video visit.      Patient has given verbal consent for Video visit? Yes    Video Start Time: 1:45PM    Telehealth Visit Details    Type of Service:  Telehealth    Video End Time (time video stopped): 2:28PM    Originating Location (pt. location): Home    Additional Participants in Telehealth Visit: None    Distant Location (provider location):  [place] [facility]     Mode of Communication (Audio Visual or Audio Only):  Audio Visual    Aspen Collins, PT  May 12, 2020

## 2020-05-15 ENCOUNTER — HOSPITAL ENCOUNTER (OUTPATIENT)
Dept: SPEECH THERAPY | Facility: CLINIC | Age: 65
Setting detail: THERAPIES SERIES
End: 2020-05-15
Attending: PSYCHIATRY & NEUROLOGY
Payer: COMMERCIAL

## 2020-05-15 PROCEDURE — 92507 TX SP LANG VOICE COMM INDIV: CPT | Mod: GN,95 | Performed by: STUDENT IN AN ORGANIZED HEALTH CARE EDUCATION/TRAINING PROGRAM

## 2020-05-15 NOTE — PROGRESS NOTES
Ne Padilla is a 64 year old female who is being seen via a billable video visit.      Patient has given verbal consent for Video visit? Yes    Video Start Time: 10:16a    Telehealth Visit Details    Type of Service:  Telehealth    Video End Time (time video stopped): 10:57a    Originating Location (pt. location): Home    Additional Participants in Telehealth Visit: None    Distant Location (provider location):  [facility]     Mode of Communication (Audio Visual or Audio Only):  Audio Visual via [employer] Allison E. Alpers, SLP  May 15, 2020

## 2020-05-19 ENCOUNTER — HOSPITAL ENCOUNTER (OUTPATIENT)
Dept: PHYSICAL THERAPY | Facility: CLINIC | Age: 65
Setting detail: THERAPIES SERIES
End: 2020-05-19
Attending: PSYCHIATRY & NEUROLOGY
Payer: COMMERCIAL

## 2020-05-19 PROCEDURE — 97112 NEUROMUSCULAR REEDUCATION: CPT | Mod: GP,95 | Performed by: PHYSICAL THERAPIST

## 2020-05-19 NOTE — PROGRESS NOTES
Ne Padilla is a 64 year old female who is being seen via a billable video visit.      Patient has given verbal consent for Video visit? Yes    Video Start Time: 1:45 PM    Telehealth Visit Details    Type of Service:  Telehealth    Video End Time (time video stopped): 2:25 PM    Originating Location (pt. location): Home    Additional Participants in Telehealth Visit: None    Distant Location (provider location):  [place] [facility]     Mode of Communication (Audio Visual or Audio Only):  Audio Visual    Aspen Collins, PT  May 19, 2020

## 2020-05-29 ENCOUNTER — HOSPITAL ENCOUNTER (OUTPATIENT)
Dept: SPEECH THERAPY | Facility: CLINIC | Age: 65
Setting detail: THERAPIES SERIES
End: 2020-05-29
Attending: PSYCHIATRY & NEUROLOGY
Payer: COMMERCIAL

## 2020-05-29 PROCEDURE — 92507 TX SP LANG VOICE COMM INDIV: CPT | Mod: GN,GT | Performed by: STUDENT IN AN ORGANIZED HEALTH CARE EDUCATION/TRAINING PROGRAM

## 2020-05-29 NOTE — PROGRESS NOTES
Ne Padilla is a 64 year old female who is being seen via a billable video visit.      Patient has given verbal consent for Video visit? Yes    Video Start Time: 9:17a    Telehealth Visit Details    Type of Service:  Telehealth    Video End Time (time video stopped): 9:58a    Originating Location (pt. location): Home    Additional Participants in Telehealth Visit: None    Distant Location (provider location):  [facility]     Mode of Communication (Audio Visual or Audio Only):  Audio Visual via [employer] Allison E. Alpers, SLP  May 29, 2020

## 2020-06-02 ENCOUNTER — HOSPITAL ENCOUNTER (OUTPATIENT)
Dept: PHYSICAL THERAPY | Facility: CLINIC | Age: 65
Setting detail: THERAPIES SERIES
End: 2020-06-02
Attending: PSYCHIATRY & NEUROLOGY
Payer: COMMERCIAL

## 2020-06-02 PROCEDURE — 97112 NEUROMUSCULAR REEDUCATION: CPT | Mod: GP,95 | Performed by: PHYSICAL THERAPIST

## 2020-06-02 NOTE — PROGRESS NOTES
Ne Padilla is a 64 year old female who is being seen via a billable video visit.      Patient has given verbal consent for Video visit? Yes    Video Start Time: 2:00 PM    Telehealth Visit Details    Type of Service:  Telehealth    Video End Time (time video stopped): 2:40 PM    Originating Location (pt. location): Home    Additional Participants in Telehealth Visit: NOne    Distant Location (provider location):  [place] [facility]     Mode of Communication (Audio Visual or Audio Only):  Audio Visual    Aspen Collins, PT  June 2, 2020

## 2020-06-05 ENCOUNTER — HOSPITAL ENCOUNTER (OUTPATIENT)
Dept: SPEECH THERAPY | Facility: CLINIC | Age: 65
Setting detail: THERAPIES SERIES
End: 2020-06-05
Attending: PSYCHIATRY & NEUROLOGY
Payer: COMMERCIAL

## 2020-06-05 PROCEDURE — 92507 TX SP LANG VOICE COMM INDIV: CPT | Mod: GN,GT | Performed by: STUDENT IN AN ORGANIZED HEALTH CARE EDUCATION/TRAINING PROGRAM

## 2020-06-05 NOTE — PROGRESS NOTES
Ne Padilla is a 64 year old female who is being seen via a billable video visit.      Patient has given verbal consent for Video visit? Yes    Video Start Time: 10:16a    Telehealth Visit Details    Type of Service:  Telehealth    Video End Time (time video stopped): 11:01a    Originating Location (pt. location): Home    Additional Participants in Telehealth Visit: None    Distant Location (provider location):  [facility]     Mode of Communication (Audio Visual or Audio Only):  Audio Visual via [employer] Allison E. Alpers, SLP  June 5, 2020

## 2020-06-12 ENCOUNTER — HOSPITAL ENCOUNTER (OUTPATIENT)
Dept: SPEECH THERAPY | Facility: CLINIC | Age: 65
Setting detail: THERAPIES SERIES
End: 2020-06-12
Attending: PSYCHIATRY & NEUROLOGY
Payer: COMMERCIAL

## 2020-06-12 PROCEDURE — 92507 TX SP LANG VOICE COMM INDIV: CPT | Mod: GN,95 | Performed by: STUDENT IN AN ORGANIZED HEALTH CARE EDUCATION/TRAINING PROGRAM

## 2020-06-12 NOTE — PROGRESS NOTES
Ne Padilla is a 64 year old female who is being seen via a billable video visit.      Patient has given verbal consent for Video visit? Yes    Video Start Time: 9:48a    Telehealth Visit Details    Type of Service:  Telehealth    Video End Time (time video stopped): 10:31a    Originating Location (pt. location): Home    Additional Participants in Telehealth Visit: None    Distant Location (provider location):  [facility]     Mode of Communication (Audio Visual or Audio Only):  Audio Visual via [employer] Allison E. Alpers, SLP  June 12, 2020

## 2020-07-09 NOTE — PROGRESS NOTES
VIDEO VISIT converted to phone visit as internet is down    Date of Visit: July 20, 2020  Name: Ne Padilla  Date of Birth 1955  lives in [place]  [phone] (M)  [phone] (H)  email listed - quinn set up  No active proxy  Mark spouse  [phone] home  [email or web address]  [phone]      Send link via text message to  [phone]    Assessment:  (G20) Parkinsonism, unspecified Parkinsonism type (H)  (primary encounter diagnosis) diagnosed 2017 with symptoms prior to the that; left side onset. Right handed. Retiring June 2020  She has mild dyskinesias. No clear wearing off. She has had two falls in the past year.  No driving problems. No cognitive problems - slight memory changes. Has occasional constipation.   She has some bladder issues and has not had benefit from mirabregon and were considered and include pTNS, botox. Otherwise okay. Some speech changes - weak and hesitancy and some choking/swallowing problems.      (Z82.0) Family hx of ALS (amyotrophic lateral sclerosis)  - father Singh Biswas  [birth year]1933  (Z82.0) Family history of Parkinsonism father  (Z81.8) Family history of dementia father   (Z83.79) Family history of ulcerative colitis father    Genetics visit - defered   Pharmacy visit with Rian Stark    Physical and  Speech therapy =- going well.     Sarah Dooley Allison Alpers speech Gay Lenfter is her pcp through [facility] back and left hip = left side onset of her parkinson    Discussed - caffeine.         Medications     8am noon 330pm 930pm     Acetaminophen tylenol 500mg  As needed           Calcium Carbonate 600 vitamin D 400   1         Carbidopa/levodopa 25/100 sinemet 1   1 1     Cholecalciferol vitamin D3 4000 units   1         Duloxetine cymbalta 60mg       1     Fish oil-3 fatty acids 100mg   1         Gabapentin neurontin 300mg 2   2 2     Glucosamine-chondroitin   1   1     Ibuprofen advil 200mg As needed           Meclizine antivert 25mg    "    1/2 every other night     Meloxicam mobic 15mg As needed            Nonformulary magnesium glycinate 150mg 1   1       Tizanidine zanaflex 4mg        Weaning off 1/2 ever other night                                             Plan:    No medication changes at this time.    Added her  Mark as a proxy    Working well with her medication and therapy regimen    Return back in 3-6 month    No change in her sinemet    She is weaning off zanaflex and antivert.     I have reviewed the note as documented above.  This accurately captures the substance of my conversation with the patient.    Patient contact time  25  minutes. Over 50% of this visit was spent in patient care and care coordination.     Time of visit 1010am - 1036am    Damien Lujan MD      ------------------------------------------------------------------------------------------------------------------------------------------------------------------------      Ne Padilla is a 64 year old female who is being evaluated via a billable telephone visit.      The patient has been notified of following:     \"This telephone visit will be conducted via a call between you and your physician/provider. We have found that certain health care needs can be provided without the need for a physical exam.  This service lets us provide the care you need with a short phone conversation.  If a prescription is necessary we can send it directly to your pharmacy.  If lab work is needed we can place an order for that and you can then stop by our lab to have the test done at a later time.    Telephone visits are billed at different rates depending on your insurance coverage. During this emergency period, for some insurers they may be billed the same as an in-person visit.  Please reach out to your insurance provider with any questions.    If during the course of the call the physician/provider feels a telephone visit is not appropriate, you will not be charged for this " "service.\"    Patient has given verbal consent for Telephone visit?  Yes    What phone number would you like to be contacted at? [phone]    How would you like to obtain your AVS? [employer]    Phone call duration: - to be determind minutes    Damien Lujan MD        Telephone-Visit Details    The patient has been notified of following:     \"After a review of the patient s situation, this visit was changed from an in-person visit to a telephone visit to reduce the risk of COVID-19 exposure.   The patient is being evaluated via a billable telephone visit.\"    \"This Telephone visit will be conducted via a call between you and your physician/provider. We have found that certain health care needs can be provided without the need for an in-person physical exam.  This service lets us provide the care you need with a telephone  conversation.  If a prescription is necessary we can send it directly to your pharmacy.  If lab work is needed we can place an order for that and you can then stop by our lab to have the test done at a later time.    If during the course of the call the physician/provider feels a telephone visit is not appropriate, you will not be charged for this service.\"     Patient has given verbal consent for Telephone visit? Yes    Type of service:  Telephone visit     Duration of Visit:     Originating Location (pt. Location): home    Distant Location (provider location):  [place]     Mode of Communication:  Telephone    Originating Location (pt. Location):     Distant Location (provider location):  [place]     Mode of Communication:  Video Conference via [employer] (and if not possible then doximity)      Damien Lujan MD      --------------------------------------------------------------------------------------------------------------    Ne Padilla is a 64 year old female who is being evaluated via a billable video visit.      Charts reviewed  Consult " "from  Images reviewed        I have reviewed and updated the patient's Past Medical History, Social History, Family History and Medication List.    ALLERGIES  Ciprofloxacin; Hydrocodone-acetaminophen; Animal dander; No clinical screening - see comments; Prochlorperazine; and Cephalexin    Lasts visit details if there was a last visit:         Medications                                                                                                                                                                                                              14 Review of systems  are negative except for   Patient Active Problem List   Diagnosis     Family hx of ALS (amyotrophic lateral sclerosis)     Family history of Parkinsonism     Family history of dementia     Family history of ulcerative colitis     BPPV (benign paroxysmal positional vertigo)     Dysplastic nevi     Intervertebral cervical disc disorder with myelopathy, cervical region     Parkinson's disease (H)        Allergies   Allergen Reactions     Ciprofloxacin Rash     Hydrocodone-Acetaminophen Other (See Comments)     nightmares     Animal Dander Difficulty breathing     \"sneezing\"     No Clinical Screening - See Comments Difficulty breathing     \"sneezing to melon & sweet corn\"     Prochlorperazine Other (See Comments)     \" tongue goes in & out & my stomach contracts\"     Cephalexin Rash     Past Surgical History:   Procedure Laterality Date     APPENDECTOMY       NECK SURGERY      cervical laminectomy C56     Past Medical History:   Diagnosis Date     Family history of dementia 4/9/2020     Family history of Parkinsonism 4/9/2020     Family history of ulcerative colitis 4/9/2020     Family hx of ALS (amyotrophic lateral sclerosis) 4/9/2020     Social History     Socioeconomic History     Marital status:      Spouse name: Not on file     Number of children: Not on file     Years of education: Not on file     Highest education level: Not on file "   Occupational History     Not on file   Social Needs     Financial resource strain: Not on file     Food insecurity     Worry: Not on file     Inability: Not on file     Transportation needs     Medical: Not on file     Non-medical: Not on file   Tobacco Use     Smoking status: Never Smoker     Smokeless tobacco: Never Used   Substance and Sexual Activity     Alcohol use: Yes     Drug use: Not on file     Sexual activity: Not on file   Lifestyle     Physical activity     Days per week: Not on file     Minutes per session: Not on file     Stress: Not on file   Relationships     Social connections     Talks on phone: Not on file     Gets together: Not on file     Attends Mosque service: Not on file     Active member of club or organization: Not on file     Attends meetings of clubs or organizations: Not on file     Relationship status: Not on file     Intimate partner violence     Fear of current or ex partner: Not on file     Emotionally abused: Not on file     Physically abused: Not on file     Forced sexual activity: Not on file   Other Topics Concern     Not on file   Social History Narrative     Not on file     Family History   Problem Relation Age of Onset     Cancer Mother         oral     Tremor Mother      Neurologic Disorder Father      ALS Father      Parkinsonism Father      Dementia Father      Ulcerative Colitis Father      Other - See Comments Father         seen at [facility]     Prostate Cancer Father      Tremor Maternal Grandfather      Parkinsonism Other         maternal great grandfather     Rheumatoid Arthritis Sister      GI problems Brother         ? ulcerative colitis     Current Outpatient Medications   Medication Sig Dispense Refill     acetaminophen (TYLENOL) 500 MG tablet Take 2 tablets (1,000 mg) by mouth daily as needed for mild pain       calcium carbonate 600 mg-vitamin D 400 units (CALTRATE) 600-400 MG-UNIT per tablet Take 1 tablet by mouth daily       carbidopa-levodopa  (SINEMET)  MG tablet 25/100 tablet by mouth 3/day @ 8am, 330pm and 930pm 270 tablet 11     Cholecalciferol 100 MCG (4000 UT) CAPS 4000 units by mouth daily @@ 1/130pm       DULoxetine (CYMBALTA) 60 MG capsule 60mg capsule by mouth daily @ 930pm 90 capsule 3     fish oil-omega-3 fatty acids 1000 MG capsule 1000mg capsule by mouth daily @ n oon       gabapentin (NEURONTIN) 300 MG capsule 2 x 300mg tab by mouth 3/day @ 8am, 330pm and 930pm = 6/day 540 capsule 3     Glucosamine-Chondroit-Vit C-Mn (GLUCOSAMINE CHONDROITIN 1500 COMPLEX) CAPS Glucosamine 1500 chondroitin 1200mg by mouth twice daily @ noon and 9pm       ibuprofen (ADVIL/MOTRIN) 200 MG tablet Take 2 tablets (400 mg) by mouth every 4 hours as needed for mild pain       meclizine (ANTIVERT) 25 MG tablet 1/2 of 25mg tab by mouth nightly @ 930pm 45 tablet 3     meloxicam (MOBIC) 15 MG tablet Take 1 tablet (15 mg) by mouth daily as needed       NONFORMULARY Magnesium glycinate 150mg by mouth twice daily @ 8am and 330pm       tiZANidine (ZANAFLEX) 4 MG tablet 1/2 of 4mg tablets by mouth daily @ 930pm 45 tablet 3

## 2020-07-10 ENCOUNTER — HOSPITAL ENCOUNTER (OUTPATIENT)
Dept: SPEECH THERAPY | Facility: CLINIC | Age: 65
Setting detail: THERAPIES SERIES
End: 2020-07-10
Attending: PSYCHIATRY & NEUROLOGY
Payer: COMMERCIAL

## 2020-07-10 PROCEDURE — 92507 TX SP LANG VOICE COMM INDIV: CPT | Mod: GN,GT | Performed by: STUDENT IN AN ORGANIZED HEALTH CARE EDUCATION/TRAINING PROGRAM

## 2020-07-10 NOTE — PROGRESS NOTES
Outpatient Speech Language Pathology Discharge Note     Patient: Ne Padilla  : 1955    Beginning/End Dates of Reporting Period:  2020 to 7/10/2020; 6 therapy sessions since evaluation    Referring Provider: Dr. Lujan    Therapy Diagnosis: Mild dysphonia and minimal hypokinetic dysarthria    Client Self Report: Pt was seen for a telehealth visit today.  She reports that her voice has been good with continued practice of therapy exercises.  Her voice will still occasionally become scratchy, which she associates with allergies.     Objective Measurements:      Pt completed the following previously trained voice exercises when given no-min cues/models from SLP to maintain appropriate loudness.  Pt completed the maximum sustained loud /a/ task x15 with an average 24.9 seconds duration while maintaining target loudness.  Pt completed the maximum loud fundamental frequency range (High/Low) tasks x15 each in a range of Eb3-Ab4 while maintaining appropriate loudness.  Pt read aloud her list of 10 functional phrases x3 given no-min cues from SLP to maintain appropriate loudness.  Given no-min cues from SLP to maintain target loudness, pt participated in conversation with SLP for the remainder of the session at an appropriate volume.  SLP reviewed education regarding an optimal regimen of HEP for generalization and maintenance, and pt verbalized understanding.        Goals:  Goal Identifier Breath   Goal Description Patient will increase vocal loudness to an average SPL of >70 dB at 30 cm during sustained /a/ phonation for at least 14 seconds on average independently in order to increase vocal respiratory support required for functional communication.   Target Date 20   Date Met  07/10/20   Progress: Good, goal met per objective measures above and SLP judgment.  Unable to obtain objective SPL measures d/t telehealth visit, but loudness is judged to be appropriate by SLP.     Goal Identifier Conversation  "  Goal Description Patient will increase vocal loudness during narrative reading and semi-spontaneous conversation tasks to a minimum range of 70-75 dB at 30 cm given min cues in order to improve vocal loudness and voice quality for daily communication tasks.   Target Date 07/30/20   Date Met  07/10/20   Progress: Good, goal met per objective measures above and SLP judgment.  Unable to obtain objective SPL measures d/t telehealth visit, but loudness is judged to be appropriate by SLP.     Progress Toward Goals:    Progress this reporting period: Good, all goals met.  Patient has made consistent improvements in vocal volume and voice quality throughout the course of therapy with the techniques and exercises trained.  Patient is able to independently implement a \"think loud\" strategy in her daily conversations, and she has received comments from multiple friends that her voice is sounding stronger and easier to understand.  She is motivated to continue a regular home regimen of practice of therapy exercises in order to maintain these improvements.    Plan:  Discharge from therapy.    Discharge:    Reason for Discharge: Patient has met all goals.    Discharge Plan: Patient to continue home program.      Allison Alpers, B.A. (music), M.A., CCC-SLP  Speech-Language Pathologist  Certificate of Vocology  [facility]  [phone]      "

## 2020-07-10 NOTE — PROGRESS NOTES
Ne Padilla is a 64 year old female who is being seen via a billable video visit.      Patient has given verbal consent for Video visit? Yes    Video Start Time: 9:18a    Telehealth Visit Details    Type of Service:  Telehealth    Video End Time (time video stopped): 10:01a    Originating Location (pt. location): Home    Additional Participants in Telehealth Visit: None    Distant Location (provider location):  [facility]     Mode of Communication (Audio Visual or Audio Only):  Audio Visual via [employer] Allison E. Alpers, SLP  July 10, 2020

## 2020-07-16 ENCOUNTER — TELEPHONE (OUTPATIENT)
Dept: NEUROLOGY | Facility: CLINIC | Age: 65
End: 2020-07-16

## 2020-07-20 ENCOUNTER — VIRTUAL VISIT (OUTPATIENT)
Dept: NEUROLOGY | Facility: CLINIC | Age: 65
End: 2020-07-20
Payer: COMMERCIAL

## 2020-07-20 DIAGNOSIS — G20.C PARKINSONISM, UNSPECIFIED PARKINSONISM TYPE (H): Primary | ICD-10-CM

## 2020-07-20 PROCEDURE — 99214 OFFICE O/P EST MOD 30 MIN: CPT | Mod: 95 | Performed by: PSYCHIATRY & NEUROLOGY

## 2020-07-20 RX ORDER — CARBIDOPA AND LEVODOPA 25; 100 MG/1; MG/1
TABLET ORAL
Qty: 270 TABLET | Refills: 11 | Status: SHIPPED | OUTPATIENT
Start: 2020-07-20 | End: 2021-07-19

## 2020-07-20 NOTE — LETTER
7/20/2020         RE: Ne Padilla  [address]   [facility] [address]        Dear Colleague,    Thank you for referring your patient, Ne Padilla, to the [facility]. Please see a copy of my visit note below.        VIDEO VISIT converted to phone visit as internet is down    Date of Visit: July 20, 2020  Name: Ne Padilla  Date of Birth 1955  lives in [place]  [phone] (M)  [phone] (H)  email listed - [employer] set up  No active proxy  Mark spouse  [phone] home  [email or web address]  [phone]      Send link via text message to  [phone]    Assessment:  (G20) Parkinsonism, unspecified Parkinsonism type (H)  (primary encounter diagnosis) diagnosed 2017 with symptoms prior to the that; left side onset. Right handed. Retiring June 2020  She has mild dyskinesias. No clear wearing off. She has had two falls in the past year.  No driving problems. No cognitive problems - slight memory changes. Has occasional constipation.   She has some bladder issues and has not had benefit from mirabregon and were considered and include pTNS, botox. Otherwise okay. Some speech changes - weak and hesitancy and some choking/swallowing problems.      (Z82.0) Family hx of ALS (amyotrophic lateral sclerosis)  - father Singh Biswas  [birth year]/1933  (Z82.0) Family history of Parkinsonism father  (Z81.8) Family history of dementia father   (Z83.79) Family history of ulcerative colitis father    Genetics visit - defered   Pharmacy visit with Rian Stark    Physical and  Speech therapy =- going well.     Sarah Dooley Allison Alpers speech Gay Lenfter is her pcp through [facility] [place] and left hip = left side onset of her parkinson    Discussed - caffeine.         Medications     8am noon 330pm 930pm     Acetaminophen tylenol 500mg  As needed           Calcium Carbonate 600 vitamin D 400   1         Carbidopa/levodopa 25/100 sinemet 1   1 1     Cholecalciferol vitamin D3 4000  "units   1         Duloxetine cymbalta 60mg       1     Fish oil-3 fatty acids 100mg   1         Gabapentin neurontin 300mg 2   2 2     Glucosamine-chondroitin   1   1     Ibuprofen advil 200mg As needed           Meclizine antivert 25mg       1/2 every other night     Meloxicam mobic 15mg As needed            Nonformulary magnesium glycinate 150mg 1   1       Tizanidine zanaflex 4mg        Weaning off 1/2 ever other night                                             Plan:    No medication changes at this time.    Added her  Mark as a proxy    Working well with her medication and therapy regimen    Return back in 3-6 month    No change in her sinemet    She is weaning off zanaflex and antivert.     I have reviewed the note as documented above.  This accurately captures the substance of my conversation with the patient.    Patient contact time  25  minutes. Over 50% of this visit was spent in patient care and care coordination.     Time of visit 1010am - 1036am    Damien Lujan MD      ------------------------------------------------------------------------------------------------------------------------------------------------------------------------      Ne Padilla is a 64 year old female who is being evaluated via a billable telephone visit.      The patient has been notified of following:     \"This telephone visit will be conducted via a call between you and your physician/provider. We have found that certain health care needs can be provided without the need for a physical exam.  This service lets us provide the care you need with a short phone conversation.  If a prescription is necessary we can send it directly to your pharmacy.  If lab work is needed we can place an order for that and you can then stop by our lab to have the test done at a later time.    Telephone visits are billed at different rates depending on your insurance coverage. During this emergency period, for some insurers they may be billed " "the same as an in-person visit.  Please reach out to your insurance provider with any questions.    If during the course of the call the physician/provider feels a telephone visit is not appropriate, you will not be charged for this service.\"    Patient has given verbal consent for Telephone visit?  Yes    What phone number would you like to be contacted at? [phone]    How would you like to obtain your AVS? Scotty Gearhart    Phone call duration: - to be determind minutes    Damien Lujan MD        Telephone-Visit Details    The patient has been notified of following:     \"After a review of the patient s situation, this visit was changed from an in-person visit to a telephone visit to reduce the risk of COVID-19 exposure.   The patient is being evaluated via a billable telephone visit.\"    \"This Telephone visit will be conducted via a call between you and your physician/provider. We have found that certain health care needs can be provided without the need for an in-person physical exam.  This service lets us provide the care you need with a telephone  conversation.  If a prescription is necessary we can send it directly to your pharmacy.  If lab work is needed we can place an order for that and you can then stop by our lab to have the test done at a later time.    If during the course of the call the physician/provider feels a telephone visit is not appropriate, you will not be charged for this service.\"     Patient has given verbal consent for Telephone visit? Yes    Type of service:  Telephone visit     Duration of Visit:     Originating Location (pt. Location): home    Distant Location (provider location):  [facility]     Mode of Communication:  Telephone    Originating Location (pt. Location):     Distant Location (provider location):  [facility]     Mode of Communication:  Video Conference via [employer] (and if not possible then doximjanna)      Damien Lujan " "MD      --------------------------------------------------------------------------------------------------------------    Ne Padilla is a 64 year old female who is being evaluated via a billable video visit.      Charts reviewed  Consult from  Images reviewed        I have reviewed and updated the patient's Past Medical History, Social History, Family History and Medication List.    ALLERGIES  Ciprofloxacin; Hydrocodone-acetaminophen; Animal dander; No clinical screening - see comments; Prochlorperazine; and Cephalexin    Lasts visit details if there was a last visit:         Medications                                                                                                                                                                                                              14 Review of systems  are negative except for   Patient Active Problem List   Diagnosis     Family hx of ALS (amyotrophic lateral sclerosis)     Family history of Parkinsonism     Family history of dementia     Family history of ulcerative colitis     BPPV (benign paroxysmal positional vertigo)     Dysplastic nevi     Intervertebral cervical disc disorder with myelopathy, cervical region     Parkinson's disease (H)        Allergies   Allergen Reactions     Ciprofloxacin Rash     Hydrocodone-Acetaminophen Other (See Comments)     nightmares     Animal Dander Difficulty breathing     \"sneezing\"     No Clinical Screening - See Comments Difficulty breathing     \"sneezing to melon & sweet corn\"     Prochlorperazine Other (See Comments)     \" tongue goes in & out & my stomach contracts\"     Cephalexin Rash     Past Surgical History:   Procedure Laterality Date     APPENDECTOMY       NECK SURGERY      cervical laminectomy C56     Past Medical History:   Diagnosis Date     Family history of dementia 4/9/2020     Family history of Parkinsonism 4/9/2020     Family history of ulcerative colitis 4/9/2020     Family hx of ALS (amyotrophic " lateral sclerosis) 4/9/2020     Social History     Socioeconomic History     Marital status:      Spouse name: Not on file     Number of children: Not on file     Years of education: Not on file     Highest education level: Not on file   Occupational History     Not on file   Social Needs     Financial resource strain: Not on file     Food insecurity     Worry: Not on file     Inability: Not on file     Transportation needs     Medical: Not on file     Non-medical: Not on file   Tobacco Use     Smoking status: Never Smoker     Smokeless tobacco: Never Used   Substance and Sexual Activity     Alcohol use: Yes     Drug use: Not on file     Sexual activity: Not on file   Lifestyle     Physical activity     Days per week: Not on file     Minutes per session: Not on file     Stress: Not on file   Relationships     Social connections     Talks on phone: Not on file     Gets together: Not on file     Attends Scientologist service: Not on file     Active member of club or organization: Not on file     Attends meetings of clubs or organizations: Not on file     Relationship status: Not on file     Intimate partner violence     Fear of current or ex partner: Not on file     Emotionally abused: Not on file     Physically abused: Not on file     Forced sexual activity: Not on file   Other Topics Concern     Not on file   Social History Narrative     Not on file     Family History   Problem Relation Age of Onset     Cancer Mother         oral     Tremor Mother      Neurologic Disorder Father      ALS Father      Parkinsonism Father      Dementia Father      Ulcerative Colitis Father      Other - See Comments Father         seen at [facility]     Prostate Cancer Father      Tremor Maternal Grandfather      Parkinsonism Other         maternal great grandfather     Rheumatoid Arthritis Sister      GI problems Brother         ? ulcerative colitis     Current Outpatient Medications   Medication Sig Dispense Refill      acetaminophen (TYLENOL) 500 MG tablet Take 2 tablets (1,000 mg) by mouth daily as needed for mild pain       calcium carbonate 600 mg-vitamin D 400 units (CALTRATE) 600-400 MG-UNIT per tablet Take 1 tablet by mouth daily       carbidopa-levodopa (SINEMET)  MG tablet 25/100 tablet by mouth 3/day @ 8am, 330pm and 930pm 270 tablet 11     Cholecalciferol 100 MCG (4000 UT) CAPS 4000 units by mouth daily @@ 1/130pm       DULoxetine (CYMBALTA) 60 MG capsule 60mg capsule by mouth daily @ 930pm 90 capsule 3     fish oil-omega-3 fatty acids 1000 MG capsule 1000mg capsule by mouth daily @ n oon       gabapentin (NEURONTIN) 300 MG capsule 2 x 300mg tab by mouth 3/day @ 8am, 330pm and 930pm = 6/day 540 capsule 3     Glucosamine-Chondroit-Vit C-Mn (GLUCOSAMINE CHONDROITIN 1500 COMPLEX) CAPS Glucosamine 1500 chondroitin 1200mg by mouth twice daily @ noon and 9pm       ibuprofen (ADVIL/MOTRIN) 200 MG tablet Take 2 tablets (400 mg) by mouth every 4 hours as needed for mild pain       meclizine (ANTIVERT) 25 MG tablet 1/2 of 25mg tab by mouth nightly @ 930pm 45 tablet 3     meloxicam (MOBIC) 15 MG tablet Take 1 tablet (15 mg) by mouth daily as needed       NONFORMULARY Magnesium glycinate 150mg by mouth twice daily @ 8am and 330pm       tiZANidine (ZANAFLEX) 4 MG tablet 1/2 of 4mg tablets by mouth daily @ 930pm 45 tablet 3                       Again, thank you for allowing me to participate in the care of your patient.        Sincerely,        Damien Lujan MD

## 2020-07-21 ENCOUNTER — HOSPITAL ENCOUNTER (OUTPATIENT)
Dept: PHYSICAL THERAPY | Facility: CLINIC | Age: 65
Setting detail: THERAPIES SERIES
End: 2020-07-21
Attending: PSYCHIATRY & NEUROLOGY
Payer: COMMERCIAL

## 2020-07-21 PROCEDURE — 97112 NEUROMUSCULAR REEDUCATION: CPT | Mod: GP,95 | Performed by: PHYSICAL THERAPIST

## 2020-07-21 NOTE — PROGRESS NOTES
Outpatient Physical Therapy Discharge Note     Patient: Ne Padilla  : 1955    Beginning/End Dates of Reporting Period:  2020 to 2020 Seen for 6 visits    Referring Provider: Damien Lujan MD    Therapy Diagnosis: Impaired balance with falls, weakness, LBP     Client Self Report: Ne reports she has been doing her BIG exs 5x week and reports she notices her balance has improved and her walking feels more stable to her.  She has been gardening and it is easier to get up from a kneeling position.    Objective Measurements:      SIt<> 30 seconds= 13 reps       Goals:  Goal Identifier Gardening   Goal Description Ne will report being able to garden for 45 minute intervals with tolerable fatigue 4x week and report increased ease of getting up and down from a squatting position.   Target Date 20   Date Met  20   Progress: She is gardening 1-2 x weekly and reports increased ease getting out of a squat on a consistent basis.     Goal Identifier Walking for aerobic activity   Goal Description Ne will walk 3 miles or more in 40 minutes on a daily basis for aerobic activity with tolerable fatigue with no left leg pain.   Target Date 20   Date Met  20   Progress: Walking 3-4 x week for 40 minute intervals when heat is not a factor.     Goal Identifier Amplitude training   Goal Description Ne will demonstrate amplitude training techniques with gait and transfer patterns for improved functional safety on a consistent basis and report no falls in a two month time frame.   Target Date 20   Date Met  20   Progress: Independent with amplitude training exs for BIG 1-7     Progress Toward Goals:   Progress this reporting period: Ne reports it is easier walking on grass and getting in and out of the car.  She feels more stable getting on and off their boat or standing on the boat.  She reports exercises continue to feel better for her going through increased  range.    Plan:  Discharge from therapy.    Discharge:    Reason for Discharge: Patient has met all goals.    Equipment Issued: None    Discharge Plan: Patient to continue home program.

## 2020-07-21 NOTE — PROGRESS NOTES
Ne Padilla is a 64 year old female who is being seen via a billable video visit.      Patient has given verbal consent for Video visit? Yes    Video Start Time: 1:05 PM    Telehealth Visit Details    Type of Service:  Telehealth    Video End Time (time video stopped): 1:30 PM    Originating Location (pt. location): Home    Additional Participants in Telehealth Visit: None    Distant Location (provider location):  [facility]     Mode of Communication (Audio Visual or Audio Only):  Audio Visual    Aspen Collins, PT  July 21, 2020

## 2020-11-22 ENCOUNTER — HEALTH MAINTENANCE LETTER (OUTPATIENT)
Age: 65
End: 2020-11-22

## 2021-01-05 ENCOUNTER — DOCUMENTATION ONLY (OUTPATIENT)
Dept: CARE COORDINATION | Facility: CLINIC | Age: 66
End: 2021-01-05

## 2021-01-10 PROBLEM — G20.A1 PARKINSON'S DISEASE (H): Status: ACTIVE | Noted: 2017-08-10

## 2021-01-10 RX ORDER — MECLIZINE HYDROCHLORIDE 25 MG/1
12.5 TABLET ORAL
COMMUNITY
End: 2021-01-10

## 2021-01-10 NOTE — PROGRESS NOTES
VIDEO VISIT - amwell  System did not work   Used her apple smart phone platform    Date of Visit: January 19, 2021  Name: Ne Padilla  Date of Birth 1955  lives in [place]  [phone] (M)  [phone] (H)  email listed - [employer] set up  No active proxy  Mark spouse  [phone] home  [email or web address]  [phone]         Send link via text message to  [phone]    Assessment:  (G20) Parkinsonism, unspecified Parkinsonism type (H)  (primary encounter diagnosis)  Carbidopa/levodopa 25/100 sinemet tab 3/day    (Z82.0) Family hx of ALS (amyotrophic lateral sclerosis)  - father Singh Biswas  [birth year]1933  (Z82.0) Family history of Parkinsonism father  (Z81.8) Family history of dementia father   (Z83.79) Family history of ulcerative colitis father    Sarah Dooley Allison Alpers speech      Sary Sol is her pcp through [facility]     Ongoing back and left hip = left side onset of her parkinson    Gait/Balance/Falls - had a fall in November - garage - stepping on a ladder and fell when  Missed a step  Had 5 staples on the back of her head. Did not pass out. Had a ct scan and was fine.     Exercise/Therapy - recovering from foot surgery and has not been doing the walking and big and loud  Had been doing yoga from chiropractor    For musculoskeletal pain  Duloxetine cymbalta 60mg  Gabapentin neurontin 2 x 300mg 3/day    Cognitive/Driving - denies    Mood  - okay    Hallucinations/delusions  - not    Sleep  - has wild dreams; and has not used melatonin    Bladder  - seen urology and has some stress urinary incontinence  Considering a procedure for this. Medication did not work.  Will see someone for a procedure.  Considering botox for her bladder    GI/Constipation/GERD   Nonformulary magnesium glycinate 150mg twice daily  Ondansetron zofran 4mg ODT - not using    ENDO -   Calcium Carbonate 600 vitamin D 400  Cholecalciferol vitamin D3 4000 units  Fish oil-3 fatty acids 100mg -  stopped  Glucosamine-chondroitin twice daily     Nodule found on thyroid and was ultrasound and will have a 6 month followup    Cardio/heart -     Vision - glasses    Heme  -     Other:    Right bunion surgery 2021 and recovery    Hydroxyzine vistaril - not using  Ibuprofen 200mg as needed  Ibuprofen 800mg not using    Off oxycodone since Saturday    Meclizine ever other night - on wean   Rare meloxicam mobic     Tizanidine zanaflex 4mg 1/2 every other night. - on wean      Medications     8am noon 330pm 930pm     Acetaminophen tylenol 500mg  As needed           Calcium Carbonate 600 vitamin D 400   1         Carbidopa/levodopa 25/100 sinemet 1   1 1     Cholecalciferol vitamin D3 4000 units   1         Duloxetine cymbalta 60mg       1     Fish oil-3 fatty acids 100mg Not using          Gabapentin neurontin 300mg 2   2 2     Glucosamine-chondroitin   1   1     Ibuprofen advil 200mg As needed                   Meclizine antivert 25mg  wean     1/2 every other night     Melatonin  3mg Not yet started       Meloxicam mobic 15mg As needed            Nonformulary magnesium glycinate 150mg 1   1       Tizanidine zanaflex 4mg   wean     1/2 every other night                                              Plan:  No change in parkinson medication    May consider melatonin for her 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  This accurately captures the substance of my conversation with the patient and total time spent preparing for visit, executing  "visit and completing visit on the day of the visit:  30 minutes.     Damien Lujan MD      ------------------------------------------------------------------------------------------------------------------------------------------------------------------------    Video-Visit Details    The patient has been notified of following:     \"After a review of the patient s situation, this visit was changed from an in-person visit to a video visit to reduce the risk of COVID-19 exposure.   The patient is being evaluated via a billable video visit.\"    \"This video visit will be conducted via a call between you and your physician/provider. We have found that certain health care needs can be provided without the need for an in-person physical exam.  This service lets us provide the care you need with a video conversation.  If a prescription is necessary we can send it directly to your pharmacy.  If lab work is needed we can place an order for that and you can then stop by our lab to have the test done at a later time.    If during the course of the call the physician/provider feels a video visit is not appropriate, you will not be charged for this service.\"     Patient has given verbal consent for Video visit? Yes    Patient would like the video invitation sent by:     Type of service:  Video Visit    Video Start Time:     Video End Time (time video stopped):     Duration:  minutes - see above    Originating Location (pt. Location):     Distant Location (provider location):  [facility] NEUROLOGY [facility]     Mode of Communication:  Video Conference via [employer] (and if not possible then doximity)      Damien Lujan MD      --------------------------------------------------------------------------------------------------------------    Ne Padilla is a 65 year old female who is being evaluated via a billable video visit.      Charts reviewed  Consult from  Images reviewed        I have reviewed and updated the " "patient's Past Medical History, Social History, Family History and Medication List.    ALLERGIES  Ciprofloxacin, Hydrocodone-acetaminophen, Animal dander, No clinical screening - see comments, Prochlorperazine, and Cephalexin    Lasts visit details if there was a last visit:       14 Review of systems  are negative except for   Patient Active Problem List   Diagnosis     Family hx of ALS (amyotrophic lateral sclerosis)     Family history of Parkinsonism     Family history of dementia     Family history of ulcerative colitis     BPPV (benign paroxysmal positional vertigo)     Dysplastic nevi     Intervertebral cervical disc disorder with myelopathy, cervical region     Parkinson's disease (H)        Allergies   Allergen Reactions     Ciprofloxacin Rash     Hydrocodone-Acetaminophen Other (See Comments)     nightmares     Animal Dander Difficulty breathing     \"sneezing\"     No Clinical Screening - See Comments Difficulty breathing     \"sneezing to melon & sweet corn\"     Prochlorperazine Other (See Comments)     \" tongue goes in & out & my stomach contracts\"     Cephalexin Rash     Past Surgical History:   Procedure Laterality Date     APPENDECTOMY       NECK SURGERY      cervical laminectomy C56     Past Medical History:   Diagnosis Date     Family history of dementia 4/9/2020     Family history of Parkinsonism 4/9/2020     Family history of ulcerative colitis 4/9/2020     Family hx of ALS (amyotrophic lateral sclerosis) 4/9/2020     Social History     Socioeconomic History     Marital status:      Spouse name: Not on file     Number of children: Not on file     Years of education: Not on file     Highest education level: Not on file   Occupational History     Not on file   Social Needs     Financial resource strain: Not on file     Food insecurity     Worry: Not on file     Inability: Not on file     Transportation needs     Medical: Not on file     Non-medical: Not on file   Tobacco Use     Smoking status: " Never Smoker     Smokeless tobacco: Never Used   Substance and Sexual Activity     Alcohol use: Yes     Drug use: Not on file     Sexual activity: Not on file   Lifestyle     Physical activity     Days per week: Not on file     Minutes per session: Not on file     Stress: Not on file   Relationships     Social connections     Talks on phone: Not on file     Gets together: Not on file     Attends Yarsani service: Not on file     Active member of club or organization: Not on file     Attends meetings of clubs or organizations: Not on file     Relationship status: Not on file     Intimate partner violence     Fear of current or ex partner: Not on file     Emotionally abused: Not on file     Physically abused: Not on file     Forced sexual activity: Not on file   Other Topics Concern     Not on file   Social History Narrative     Not on file     Family History   Problem Relation Age of Onset     Cancer Mother         oral     Tremor Mother      Neurologic Disorder Father      ALS Father      Parkinsonism Father      Dementia Father      Ulcerative Colitis Father      Other - See Comments Father         seen at [facility]     Prostate Cancer Father      Tremor Maternal Grandfather      Parkinsonism Other         maternal great grandfather     Rheumatoid Arthritis Sister      GI problems Brother         ? ulcerative colitis     Current Outpatient Medications   Medication Sig Dispense Refill     acetaminophen (TYLENOL) 500 MG tablet Take 2 tablets (1,000 mg) by mouth daily as needed for mild pain       calcium carbonate 600 mg-vitamin D 400 units (CALTRATE) 600-400 MG-UNIT per tablet 1 tab by mouth daily @ noon 90 tablet 3     carbidopa-levodopa (SINEMET)  MG tablet 25/100 tablet by mouth 3/day @ 8am, 330pm and 930pm 270 tablet 11     Cholecalciferol 100 MCG (4000 UT) CAPS 4000 units by mouth daily @@ 1/130pm       DULoxetine (CYMBALTA) 60 MG capsule 60mg capsule by mouth daily @ 930pm 90 capsule 3     fish  oil-omega-3 fatty acids 1000 MG capsule 1000mg capsule by mouth daily @ n oon       gabapentin (NEURONTIN) 300 MG capsule 2 x 300mg tab by mouth 3/day @ 8am, 330pm and 930pm = 6/day 540 capsule 3     Glucosamine-Chondroit-Vit C-Mn (GLUCOSAMINE CHONDROITIN 1500 COMPLEX) CAPS Glucosamine 1500 chondroitin 1200mg by mouth twice daily @ noon and 9pm       ibuprofen (ADVIL/MOTRIN) 200 MG tablet Take 2 tablets (400 mg) by mouth every 4 hours as needed for mild pain       meclizine (ANTIVERT) 25 MG tablet Take 12.5 mg by mouth every 48 hours       meclizine (ANTIVERT) 25 MG tablet 1/2 of 25mg tab by mouth every other night @ 930pm 45 tablet 3     meloxicam (MOBIC) 15 MG tablet Take 1 tablet (15 mg) by mouth daily as needed       NONFORMULARY Magnesium glycinate 150mg by mouth twice daily @ 8am and 330pm       tiZANidine (ZANAFLEX) 4 MG tablet 1/2 of 4mg tablets by mouth every other day @ 930pm 45 tablet 3         Medications

## 2021-01-19 ENCOUNTER — VIRTUAL VISIT (OUTPATIENT)
Dept: NEUROLOGY | Facility: CLINIC | Age: 66
End: 2021-01-19
Payer: COMMERCIAL

## 2021-01-19 DIAGNOSIS — G20.C PARKINSONISM, UNSPECIFIED PARKINSONISM TYPE (H): Primary | ICD-10-CM

## 2021-01-19 PROCEDURE — 99214 OFFICE O/P EST MOD 30 MIN: CPT | Mod: 95 | Performed by: PSYCHIATRY & NEUROLOGY

## 2021-01-19 RX ORDER — OXYCODONE AND ACETAMINOPHEN 5; 325 MG/1; MG/1
1-2 TABLET ORAL
COMMUNITY
Start: 2021-01-15 | End: 2021-01-19

## 2021-01-19 RX ORDER — ONDANSETRON 4 MG/1
TABLET, ORALLY DISINTEGRATING ORAL
COMMUNITY
Start: 2021-01-15 | End: 2021-01-19

## 2021-01-19 RX ORDER — IBUPROFEN 800 MG/1
800 TABLET, FILM COATED ORAL
COMMUNITY
Start: 2021-01-14 | End: 2021-01-19

## 2021-01-19 RX ORDER — HYDROXYZINE PAMOATE 25 MG/1
25-50 CAPSULE ORAL
COMMUNITY
Start: 2021-01-15 | End: 2021-01-19

## 2021-01-19 RX ORDER — IBUPROFEN 200 MG
400-600 TABLET ORAL EVERY 4 HOURS PRN
COMMUNITY
Start: 2021-01-19

## 2021-01-19 NOTE — LETTER
1/19/2021       RE: Ne Padilla  [address]   [facility] [address]     Dear Colleague,    Thank you for referring your patient, Ne Padilla, to the [facility] NEUROLOGY CLINIC [place] [facility]. Please see a copy of my visit note below.      VIDEO VISIT - [employer]  System did not work   Used her apple smart phone platform    Date of Visit: January 19, 2021  Name: Ne Padilla  Date of Birth 1955  lives in [place]  [phone] (M)  [phone] (H)  email listed - Instant [employer] set up  No active proxy  Mark spouse  [phone] home  [email or web address]  [phone]         Send link via text message to  [phone]    Assessment:  (G20) Parkinsonism, unspecified Parkinsonism type (H)  (primary encounter diagnosis)  Carbidopa/levodopa 25/100 sinemet tab 3/day    (Z82.0) Family hx of ALS (amyotrophic lateral sclerosis)  - father Singh Biswas  11/[birth year]1933  (Z82.0) Family history of Parkinsonism father  (Z81.8) Family history of dementia father   (Z83.79) Family history of ulcerative colitis father    Maddy Collins   Allison Alpers speech Gay Lenfter is her pcp through [facility]     Ongoing back and left hip = left side onset of her parkinson    Gait/Balance/Falls - had a fall in November - garage - stepping on a ladder and fell when  Missed a step  Had 5 staples on the back of her head. Did not pass out. Had a ct scan and was fine.     Exercise/Therapy - recovering from foot surgery and has not been doing the walking and big and loud  Had been doing yoga from chiropractor    For musculoskeletal pain  Duloxetine cymbalta 60mg  Gabapentin neurontin 2 x 300mg 3/day    Cognitive/Driving - denies    Mood  - okay    Hallucinations/delusions  - not    Sleep  - has wild dreams; and has not used melatonin    Bladder  - seen urology and has some stress urinary incontinence  Considering a procedure for this. Medication did not work.  Will  see someone for a procedure.  Considering botox for her bladder    GI/Constipation/GERD   Nonformulary magnesium glycinate 150mg twice daily  Ondansetron zofran 4mg ODT - not using    ENDO -   Calcium Carbonate 600 vitamin D 400  Cholecalciferol vitamin D3 4000 units  Fish oil-3 fatty acids 100mg - stopped  Glucosamine-chondroitin twice daily     Nodule found on thyroid and was ultrasound and will have a 6 month followup    Cardio/heart -     Vision - glasses    Heme  -     Other:    Right bunion surgery 2021 and recovery    Hydroxyzine vistaril - not using  Ibuprofen 200mg as needed  Ibuprofen 800mg not using    Off oxycodone since Saturday    Meclizine ever other night - on wean   Rare meloxicam mobic     Tizanidine zanaflex 4mg 1/2 every other night. - on wean      Medications     8am noon 330pm 930pm     Acetaminophen tylenol 500mg  As needed           Calcium Carbonate 600 vitamin D 400   1         Carbidopa/levodopa 25/100 sinemet 1   1 1     Cholecalciferol vitamin D3 4000 units   1         Duloxetine cymbalta 60mg       1     Fish oil-3 fatty acids 100mg Not using          Gabapentin neurontin 300mg 2   2 2     Glucosamine-chondroitin   1   1     Ibuprofen advil 200mg As needed                   Meclizine antivert 25mg  wean     1/2 every other night     Melatonin  3mg Not yet started       Meloxicam mobic 15mg As needed            Nonformulary magnesium glycinate 150mg 1   1       Tizanidine zanaflex 4mg   wean     1/2 every other night                                              Plan:  No change in parkinson medication    May consider melatonin for her 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  This accurately captures the substance of my conversation with the patient and total time spent preparing for visit, executing visit and completing visit on the day of the visit:  30 minutes.     Damien Lujan MD      ------------------------------------------------------------------------------------------------------------------------------------------------------------------------    Video-Visit Details    The patient has been notified of following:     \"After a review of the patient s situation, this visit was changed from an in-person visit to a video visit to reduce the risk of COVID-19 exposure.   The patient is being evaluated via a billable video visit.\"    \"This video visit will be conducted via a call between you and your physician/provider. We have found that certain health care needs can be provided without the need for an in-person physical exam.  This service lets us provide the care you need with a video conversation.  If a prescription is necessary we can send it directly to your pharmacy.  If lab work is needed we can place an order for that and you can then stop by our lab to have the test done at a later time.    If during the course of the call the physician/provider feels a video visit is not appropriate, you will not be charged for this service.\"     Patient has given verbal consent for Video visit? Yes    Patient would like the video invitation sent by:     Type of service:  Video Visit    Video Start Time:     Video End Time (time video stopped):     Duration:  minutes - see above    Originating Location (pt. Location):     Distant Location (provider location):  [facility] NEUROLOGY [facility]     Mode of Communication:  Video Conference via [employer] (and if not possible then doximity)      Damien Lujan" "MD      --------------------------------------------------------------------------------------------------------------    Ne Padilla is a 65 year old female who is being evaluated via a billable video visit.      Charts reviewed  Consult from  Images reviewed        I have reviewed and updated the patient's Past Medical History, Social History, Family History and Medication List.    ALLERGIES  Ciprofloxacin, Hydrocodone-acetaminophen, Animal dander, No clinical screening - see comments, Prochlorperazine, and Cephalexin    Lasts visit details if there was a last visit:       14 Review of systems  are negative except for   Patient Active Problem List   Diagnosis     Family hx of ALS (amyotrophic lateral sclerosis)     Family history of Parkinsonism     Family history of dementia     Family history of ulcerative colitis     BPPV (benign paroxysmal positional vertigo)     Dysplastic nevi     Intervertebral cervical disc disorder with myelopathy, cervical region     Parkinson's disease (H)        Allergies   Allergen Reactions     Ciprofloxacin Rash     Hydrocodone-Acetaminophen Other (See Comments)     nightmares     Animal Dander Difficulty breathing     \"sneezing\"     No Clinical Screening - See Comments Difficulty breathing     \"sneezing to melon & sweet corn\"     Prochlorperazine Other (See Comments)     \" tongue goes in & out & my stomach contracts\"     Cephalexin Rash     Past Surgical History:   Procedure Laterality Date     APPENDECTOMY       NECK SURGERY      cervical laminectomy C56     Past Medical History:   Diagnosis Date     Family history of dementia 4/9/2020     Family history of Parkinsonism 4/9/2020     Family history of ulcerative colitis 4/9/2020     Family hx of ALS (amyotrophic lateral sclerosis) 4/9/2020     Social History     Socioeconomic History     Marital status:      Spouse name: Not on file     Number of children: Not on file     Years of education: Not on file     Highest " education level: Not on file   Occupational History     Not on file   Social Needs     Financial resource strain: Not on file     Food insecurity     Worry: Not on file     Inability: Not on file     Transportation needs     Medical: Not on file     Non-medical: Not on file   Tobacco Use     Smoking status: Never Smoker     Smokeless tobacco: Never Used   Substance and Sexual Activity     Alcohol use: Yes     Drug use: Not on file     Sexual activity: Not on file   Lifestyle     Physical activity     Days per week: Not on file     Minutes per session: Not on file     Stress: Not on file   Relationships     Social connections     Talks on phone: Not on file     Gets together: Not on file     Attends Scientology service: Not on file     Active member of club or organization: Not on file     Attends meetings of clubs or organizations: Not on file     Relationship status: Not on file     Intimate partner violence     Fear of current or ex partner: Not on file     Emotionally abused: Not on file     Physically abused: Not on file     Forced sexual activity: Not on file   Other Topics Concern     Not on file   Social History Narrative     Not on file     Family History   Problem Relation Age of Onset     Cancer Mother         oral     Tremor Mother      Neurologic Disorder Father      ALS Father      Parkinsonism Father      Dementia Father      Ulcerative Colitis Father      Other - See Comments Father         seen at [facility]     Prostate Cancer Father      Tremor Maternal Grandfather      Parkinsonism Other         maternal great grandfather     Rheumatoid Arthritis Sister      GI problems Brother         ? ulcerative colitis     Current Outpatient Medications   Medication Sig Dispense Refill     acetaminophen (TYLENOL) 500 MG tablet Take 2 tablets (1,000 mg) by mouth daily as needed for mild pain       calcium carbonate 600 mg-vitamin D 400 units (CALTRATE) 600-400 MG-UNIT per tablet 1 tab by mouth daily @ noon 90  tablet 3     carbidopa-levodopa (SINEMET)  MG tablet 25/100 tablet by mouth 3/day @ 8am, 330pm and 930pm 270 tablet 11     Cholecalciferol 100 MCG (4000 UT) CAPS 4000 units by mouth daily @@ 1/130pm       DULoxetine (CYMBALTA) 60 MG capsule 60mg capsule by mouth daily @ 930pm 90 capsule 3     fish oil-omega-3 fatty acids 1000 MG capsule 1000mg capsule by mouth daily @ n oon       gabapentin (NEURONTIN) 300 MG capsule 2 x 300mg tab by mouth 3/day @ 8am, 330pm and 930pm = 6/day 540 capsule 3     Glucosamine-Chondroit-Vit C-Mn (GLUCOSAMINE CHONDROITIN 1500 COMPLEX) CAPS Glucosamine 1500 chondroitin 1200mg by mouth twice daily @ noon and 9pm       ibuprofen (ADVIL/MOTRIN) 200 MG tablet Take 2 tablets (400 mg) by mouth every 4 hours as needed for mild pain       meclizine (ANTIVERT) 25 MG tablet Take 12.5 mg by mouth every 48 hours       meclizine (ANTIVERT) 25 MG tablet 1/2 of 25mg tab by mouth every other night @ 930pm 45 tablet 3     meloxicam (MOBIC) 15 MG tablet Take 1 tablet (15 mg) by mouth daily as needed       NONFORMULARY Magnesium glycinate 150mg by mouth twice daily @ 8am and 330pm       tiZANidine (ZANAFLEX) 4 MG tablet 1/2 of 4mg tablets by mouth every other day @ 930pm 45 tablet 3         Medications                                                                                                                                                                                                                    Ne is a 65 year old who is being evaluated via a billable video visit.      How would you like to obtain your AVS? mychart  If the video visit is dropped, the invitation should be resent by: please send link to  [phone]  Will anyone else be joining your video visit? no      Video Start Time:   Video-Visit Details    Type of service:  Video Visit    Video End Time:    Originating Location (pt. Location):     Distant Location (provider location):  [facility] NEUROLOGY [facility]  MARIA ISABEL     Platform used for Video Visit: [email or web address]      Again, thank you for allowing me to participate in the care of your patient.      Sincerely,    Damien Lujna MD

## 2021-01-19 NOTE — PATIENT INSTRUCTIONS
(G20) Parkinsonism, unspecified Parkinsonism type (H)  (primary encounter diagnosis)  Carbidopa/levodopa 25/100 sinemet tab 3/day    (Z82.0) Family hx of ALS (amyotrophic lateral sclerosis)  - father Singh Biswas  11/29/1933  (Z82.0) Family history of Parkinsonism father  (Z81.8) Family history of dementia father   (Z83.79) Family history of ulcerative colitis father    Maddy Niñooley Allison Alpers speech      Sary Garciaveronique is her pcp through [facility] [place]     Ongoing back and left hip = left side onset of her parkinson    Gait/Balance/Falls - had a fall in November - garage - stepping on a ladder and fell when  Missed a step  Had 5 staples on the back of her head. Did not pass out. Had a ct scan and was fine.     Exercise/Therapy - recovering from foot surgery and has not been doing the walking and big and loud  Had been doing yoga from chiropractor    For musculoskeletal pain  Duloxetine cymbalta 60mg  Gabapentin neurontin 2 x 300mg 3/day    Cognitive/Driving - denies    Mood  - okay    Hallucinations/delusions  - not    Sleep  - has wild dreams; and has not used melatonin    Bladder  - seen urology and has some stress urinary incontinence  Considering a procedure for this. Medication did not work.  Will see someone for a procedure.  Considering botox for her bladder    GI/Constipation/GERD   Nonformulary magnesium glycinate 150mg twice daily  Ondansetron zofran 4mg ODT - not using    ENDO -   Calcium Carbonate 600 vitamin D 400  Cholecalciferol vitamin D3 4000 units  Fish oil-3 fatty acids 100mg - stopped  Glucosamine-chondroitin twice daily     Nodule found on thyroid and was ultrasound and will have a 6 month followup    Cardio/heart -     Vision - glasses    Heme  -     Other:    Right bunion surgery 2021 and recovery    Hydroxyzine vistaril - not using  Ibuprofen 200mg as needed  Ibuprofen 800mg not using    Off oxycodone since Saturday    Meclizine ever other night - on wean   Rare meloxicam mobic      Tizanidine zanaflex 4mg 1/2 every other night. - on wean      Medications     8am noon 330pm 930pm     Acetaminophen tylenol 500mg  As needed           Calcium Carbonate 600 vitamin D 400   1         Carbidopa/levodopa 25/100 sinemet 1   1 1     Cholecalciferol vitamin D3 4000 units   1         Duloxetine cymbalta 60mg       1     Fish oil-3 fatty acids 100mg Not using          Gabapentin neurontin 300mg 2   2 2     Glucosamine-chondroitin   1   1     Ibuprofen advil 200mg As needed                   Meclizine antivert 25mg  wean     1/2 every other night     Melatonin  3mg Not yet started       Meloxicam mobic 15mg As needed            Nonformulary magnesium glycinate 150mg 1   1       Tizanidine zanaflex 4mg   wean     1/2 every other night                                              Plan:  No change in parkinson medication    May consider melatonin for her 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

## 2021-01-19 NOTE — PROGRESS NOTES
Ne is a 65 year old who is being evaluated via a billable video visit.      How would you like to obtain your AVS? mychart  If the video visit is dropped, the invitation should be resent by: please send link to  [phone]  Will anyone else be joining your video visit? no      Video Start Time:   Video-Visit Details    Type of service:  Video Visit    Video End Time:    Originating Location (pt. Location):     Distant Location (provider location):  [facility] NEUROLOGY [facility]     Platform used for Video Visit: [employer]

## 2021-04-15 ENCOUNTER — TRANSFERRED RECORDS (OUTPATIENT)
Dept: HEALTH INFORMATION MANAGEMENT | Facility: CLINIC | Age: 66
End: 2021-04-15

## 2021-04-16 ENCOUNTER — TELEPHONE (OUTPATIENT)
Dept: NEUROLOGY | Facility: CLINIC | Age: 66
End: 2021-04-16

## 2021-04-16 NOTE — TELEPHONE ENCOUNTER
Received medication list from [facility]  Records Date of service: 4/16/21  Copy has been sent to scanning and encounter routed to specialty nurse for review.

## 2021-05-25 ENCOUNTER — TRANSFERRED RECORDS (OUTPATIENT)
Dept: HEALTH INFORMATION MANAGEMENT | Facility: CLINIC | Age: 66
End: 2021-05-25

## 2021-05-26 ENCOUNTER — DOCUMENTATION ONLY (OUTPATIENT)
Dept: NEUROLOGY | Facility: CLINIC | Age: 66
End: 2021-05-26

## 2021-05-26 NOTE — PROGRESS NOTES
Received office note from [facility]   Records Date of service: 4/15/21  Copy has been sent to scanning and encounter routed to specialty nurse for review.

## 2021-07-12 NOTE — PROGRESS NOTES
Diagnosis/Summary/Recommendations:    PATIENT: Ne Padilla  65 year old female     : 1955    PARESH: 2021    lives in [place]  [phone] (M)  [phone] (H)  email listed - quinn set up  No active proxy  Mark spouse  [phone] home  [email or web address]  [phone]         [phone]    7 grand kids [place]       Assessment:    (G20) Parkinson's disease (H)  (primary encounter diagnosis)  Carbidopa/levodopa 25/100 sinemet tab 3/day     (Z82.0) Family hx of ALS (amyotrophic lateral sclerosis)  - father Singh Biswas  1933  (Z82.0) Family history of tremor great grand father  (Z81.8) Family history of dementia father   (Z83.79) Family history of ulcerative colitis father  Mother has had tremor   Brother has a gi problem  Sister with rheumatoid arthritis    Retired.    PD GENERATION study  Did not have any of the 7 variants in her genetic study with Dr. Kuhn    Gait/Balance/Falls     Exercise/Therapy  Walking daily  Doing big therapy  Has not been doing loud as much  Doing yoga exercises    Sarah Dooley Allison Alpers speech Gay Lenfter is her pcp [facility]    Cognitive/Driving - denies     Mood  - okay     Hallucinations/delusions  - not     Sleep  - has wild dreams;   Melatonin 2.5mg nightly which has helped reduced dream behaviors.      Bladder  - seen urology   stress urinary incontinence  Considering a procedure for this. Medication did not work.   Considering botox for her bladder, PTNS and interstim, Kegel, etc.      GI/Constipation/GERD   Nonformulary magnesium glycinate 100mg twice daily  Ondansetron zofran 4mg ODT - not using     ENDO -   Calcium Carbonate 600 vitamin D 400  Cholecalciferol vitamin D3 4000 units - see aboove  Fish oil-3 fatty acids 100mg - stopped  Glucosamine-chondroitin twice daily      Nodule found on thyroid and was ultrasound and had followup     Cardio/heart -      Vision - glasses     Heme  -       Other:     Right bunion surgery 2021 and recovery     Hydroxyzine vistaril - not using  Ibuprofen 200mg as needed  Ibuprofen 800mg not using     Off oxycodone since Saturday     Meclizine ever other night - on wean   Rare meloxicam mobic      Tizanidine zanaflex 4mg 1/2 every other night. - on wean     For musculoskeletal pain  Duloxetine cymbalta 60mg  Gabapentin neurontin 2 x 300mg 3/day     Medications     8am noon 330pm 1030pm   Acetaminophen tylenol 500mg  As needed         Calcium Carbonate 600 vitamin D 400   1       Carbidopa/levodopa 25/100 sinemet 1   1 1   Cholecalciferol vitamin D3 4000 units   1       Duloxetine cymbalta 60mg       1   Gabapentin neurontin 300mg 2   2 2   Glucosamine-chondroitin   1   1   Ibuprofen advil 200mg As needed         Meclizine antivert 25mg As needed      Melatonin  2.5mg      1   Meloxicam mobic 15mg As needed          Nonformulary magnesium glycinate 100mg 1   1     Probiotic  1      Sulfacetamide sodium sulfur 10-5% emulsion Not using yet      Tizanidine zanaflex 4mg  As needed                                                 Plan:    Visit with Nadir Kan to review genetic testing related to Ulcerative colitis as he father had this and ALS  Her great grandfather had tremor and there are numerous family members with tremor, autoimmune conditions    Has sweating issues. - discussed that it is unlikely related to her thyroid which was checked back 2020     Talked about sleep needs and has been having problems sleeping at night - insomnia; may have some RLS type of features - electric feeling - this can happen when trying t fall asleep >> than at 330am after falling asleep.    She is taking he sinemet at 25/100 at 1030pm and trying to go to bed at 11pm  Consider moving dose to 10pm  - one hour before bedtime.    She is taking gabapentin 2 x 300mg 3/day and may want to take the last dose of gabapentin sooner - 10pm rather than 1030pm    She was not anemic 1/4/2021 - no   "Iron stores done    Treating insomnia can be difficult because the medications we use can be harmful, especially in older adults. In addition, sleep medications do not tend to be very effective and should only be used short-term. Cognitive behavioral therapy (CBT) is the most effective treatment for long-term insomnia. The goal of CBT for insomnia is to address the underlying causes of the sleep problems, including your habits and how you think about sleep. You can do CBT with a trained psychologist, or from the comfort of your home by following an online program or workbook. Here are some great resources for at-home CBT:    1) 6-week online CBT course through [facility] for $40: Spine Wave/sleep  2) \"Say Belkys to Insomnia\" by Carlos Good, PhD at [employer]: 6-week program  3) \"Overcoming Insomnia: A Cognitive-Behavioral Therapy Approach Workbook\" by Salinas Ibrahim and Louise Burrows  4) \"Quiet Your Mind and Get to Sleep: Solutions to Insomnia for Those with Depression, Anxiety or Chronic Pain ([facility] Self-Help Workbook) by Louise Burrows and Bessy Thacker    Has had some more shaking and discussed as needed dose increase in sinemet or a scheduled dose increase    Return back in 3 months or so    Visit with Pat Stark Pharmacist    Coding statement:   Medical Decision Making:  #  Chronic progressive medical conditions addressed   - sweating  Review and/or interpretation of unique test or documentation from a provider outside of neurology no   Independent historian provided additional details  no  Prescription drug management and review of potential side effects and/or monitoring for side effects  yes   Health impacted by social determinants of health  no    I have reviewed the note as documented above.  This accurately captures the substance of my conversation with the patient and total time spent preparing for visit, executing visit and completing visit on the " "day of the visit:  30 minutes.      Damien Lujan MD     ______________________________________    Last visit date and details:             ______________________________________      Patient was asked about 14 Review of systems including changes in vision (dry eyes, double vision), hearing, heart, lungs, musculoskeletal, depression, anxiety, snoring, RBD, insomnia, urinary frequency, urinary urgency, constipation, swallowing problems, hematological, ID, allergies, skin problems: seborrhea, endocrinological: thyroid, diabetes, cholesterol; balance, weight changes, and other neurological problems and these were not significant at this time except for   Patient Active Problem List   Diagnosis     Family hx of ALS (amyotrophic lateral sclerosis)     Family history of Parkinsonism     Family history of dementia     Family history of ulcerative colitis     BPPV (benign paroxysmal positional vertigo)     Dysplastic nevi     Intervertebral cervical disc disorder with myelopathy, cervical region     Parkinson's disease (H)          Allergies   Allergen Reactions     Ciprofloxacin Rash     Hydrocodone-Acetaminophen Other (See Comments)     nightmares     Animal Dander Difficulty breathing     \"sneezing\"     No Clinical Screening - See Comments Difficulty breathing     \"sneezing to melon & sweet corn\"     Prochlorperazine Other (See Comments)     \" tongue goes in & out & my stomach contracts\"     Cephalexin Rash     Past Surgical History:   Procedure Laterality Date     APPENDECTOMY       FOOT SURGERY Right 2021    bunion surgery     NECK SURGERY      cervical laminectomy C56     Past Medical History:   Diagnosis Date     Family history of dementia 4/9/2020     Family history of Parkinsonism 4/9/2020     Family history of ulcerative colitis 4/9/2020     Family hx of ALS (amyotrophic lateral sclerosis) 4/9/2020     Social History     Socioeconomic History     Marital status:      Spouse name: Not on file     Number of " children: Not on file     Years of education: Not on file     Highest education level: Not on file   Occupational History     Not on file   Tobacco Use     Smoking status: Never Smoker     Smokeless tobacco: Never Used   Substance and Sexual Activity     Alcohol use: Yes     Drug use: Not on file     Sexual activity: Not on file   Other Topics Concern     Not on file   Social History Narrative     Not on file     Social Determinants of Health     Financial Resource Strain:      Difficulty of Paying Living Expenses:    Food Insecurity:      Worried About Running Out of Food in the Last Year:      Ran Out of Food in the Last Year:    Transportation Needs:      Lack of Transportation (Medical):      Lack of Transportation (Non-Medical):    Physical Activity:      Days of Exercise per Week:      Minutes of Exercise per Session:    Stress:      Feeling of Stress :    Social Connections:      Frequency of Communication with Friends and Family:      Frequency of Social Gatherings with Friends and Family:      Attends Zoroastrianism Services:      Active Member of Clubs or Organizations:      Attends Club or Organization Meetings:      Marital Status:    Intimate Partner Violence:      Fear of Current or Ex-Partner:      Emotionally Abused:      Physically Abused:      Sexually Abused:        Drug and lactation database from the [place] National [employer] of Medicine:  http://toxnet.nlm.nih.gov/cgi-bin/sis/htmlgen?LACT      B/P: Data Unavailable, T: Data Unavailable, P: Data Unavailable, R: Data Unavailable 0 lbs 0 oz  There were no vitals taken for this visit., There is no height or weight on file to calculate BMI.  Medications and Vitals not listed above were documented in the cart and reviewed by me.     Current Outpatient Medications   Medication Sig Dispense Refill     acetaminophen (TYLENOL) 500 MG tablet Take 2 tablets (1,000 mg) by mouth daily as needed for mild pain       calcium carbonate 600 mg-vitamin D 400 units  (CALTRATE) 600-400 MG-UNIT per tablet 1 tab by mouth daily @ noon 90 tablet 3     carbidopa-levodopa (SINEMET)  MG tablet 25/100 tablet by mouth 3/day @ 8am, 330pm and 930pm 270 tablet 11     Cholecalciferol 100 MCG (4000 UT) CAPS 4000 units by mouth daily @@ 1/130pm       DULoxetine (CYMBALTA) 60 MG capsule 60mg capsule by mouth daily @ 930pm 90 capsule 3     gabapentin (NEURONTIN) 300 MG capsule 2 x 300mg tab by mouth 3/day @ 8am, 330pm and 930pm = 6/day 540 capsule 3     Glucosamine-Chondroit-Vit C-Mn (GLUCOSAMINE CHONDROITIN 1500 COMPLEX) CAPS Glucosamine 1500 chondroitin 1200mg by mouth twice daily @ noon and 9pm       ibuprofen (ADVIL/MOTRIN) 200 MG tablet Take 2-3 tablets (400-600 mg) by mouth every 4 hours as needed for mild pain       meclizine (ANTIVERT) 25 MG tablet 1/2 of 25mg tab by mouth every other night @ 930pm 45 tablet 3     meloxicam (MOBIC) 15 MG tablet Take 1 tablet (15 mg) by mouth daily as needed       NONFORMULARY Magnesium glycinate 150mg by mouth twice daily @ 8am and 330pm       sulfacetamide sodium-sulfur 10-5 % EMUL Wash face twice daily       tiZANidine (ZANAFLEX) 4 MG tablet 1/2 of 4mg tablets by mouth every other day @ 930pm 45 tablet 3         Medications                                                                                                                                                  Damien Lujan MD

## 2021-07-19 ENCOUNTER — OFFICE VISIT (OUTPATIENT)
Dept: NEUROLOGY | Facility: CLINIC | Age: 66
End: 2021-07-19
Payer: COMMERCIAL

## 2021-07-19 VITALS — HEIGHT: 62 IN | BODY MASS INDEX: 21.71 KG/M2 | WEIGHT: 118 LBS

## 2021-07-19 DIAGNOSIS — Z83.79 FAMILY HISTORY OF ULCERATIVE COLITIS: ICD-10-CM

## 2021-07-19 DIAGNOSIS — G20.A1 PARKINSON'S DISEASE (H): Primary | ICD-10-CM

## 2021-07-19 DIAGNOSIS — G20.C PARKINSONISM, UNSPECIFIED PARKINSONISM TYPE (H): ICD-10-CM

## 2021-07-19 PROCEDURE — 99214 OFFICE O/P EST MOD 30 MIN: CPT | Performed by: PSYCHIATRY & NEUROLOGY

## 2021-07-19 RX ORDER — CARBIDOPA AND LEVODOPA 25; 100 MG/1; MG/1
TABLET ORAL
Qty: 270 TABLET | Refills: 11 | COMMUNITY
Start: 2021-07-19 | End: 2021-07-19

## 2021-07-19 RX ORDER — MECLIZINE HYDROCHLORIDE 25 MG/1
TABLET ORAL
COMMUNITY
Start: 2021-04-15 | End: 2024-07-12

## 2021-07-19 RX ORDER — CARBIDOPA AND LEVODOPA 25; 100 MG/1; MG/1
TABLET ORAL
Qty: 360 TABLET | Refills: 11 | Status: SHIPPED | OUTPATIENT
Start: 2021-07-19 | End: 2021-10-25

## 2021-07-19 RX ORDER — SULFACETAMIDE SODIUM, SULFUR 100; 50 MG/G; MG/G
LOTION TOPICAL DAILY PRN
COMMUNITY
Start: 2021-04-15 | End: 2024-01-08

## 2021-07-19 ASSESSMENT — PAIN SCALES - GENERAL: PAINLEVEL: NO PAIN (0)

## 2021-07-19 ASSESSMENT — MIFFLIN-ST. JEOR: SCORE: 1025.55

## 2021-07-19 NOTE — NURSING NOTE
"Ne Padilla's goals for this visit include: RETURN  She requests these members of her care team be copied on today's visit information:     PCP: Sary Fuller    Referring Provider:  Damien Lujan MD  [address] [place] [id number]    Ht 1.562 m (5' 1.5\")   Wt 53.5 kg (118 lb)   BMI 21.93 kg/m      Do you need any medication refills at today's visit? Y    Vitals:    07/19/21 0845   Weight: 53.5 kg (118 lb)   Height: 1.562 m (5' 1.5\")     "

## 2021-07-19 NOTE — LETTER
2021         RE: Ne Padilla  [address]   [facility] [address]        Dear Colleague,    Thank you for referring your patient, Ne Padilla, to the [facility] [place]. Please see a copy of my visit note below.        Diagnosis/Summary/Recommendations:    PATIENT: Ne Padilla  65 year old female     : 1955    PARESH: 2021    lives in [place]  [phone] (M)  [phone] (H)  email listed - [employer] set up  No active proxy  Mark spouse  [phone] home  [email or web address]  [id number]         [phone]    7 grand kids [place]       Assessment:    (G20) Parkinson's disease (H)  (primary encounter diagnosis)  Carbidopa/levodopa 25/100 sinemet tab 3/day     (Z82.0) Family hx of ALS (amyotrophic lateral sclerosis)  - father Singh Biswas  1933  (Z82.0) Family history of tremor great grand father  (Z81.8) Family history of dementia father   (Z83.79) Family history of ulcerative colitis father  Mother has had tremor   Brother has a gi problem  Sister with rheumatoid arthritis    Retired.    PD GENERATION study  Did not have any of the 7 variants in her genetic study with Dr. Kuhn    Gait/Balance/Falls     Exercise/Therapy  Walking daily  Doing big therapy  Has not been doing loud as much  Doing yoga exercises    Sarah Dooley Allison Alpers speech Gay Lenfter is her pcp through [facility]    Cognitive/Driving - denies     Mood  - okay     Hallucinations/delusions  - not     Sleep  - has wild dreams;   Melatonin 2.5mg nightly which has helped reduced dream behaviors.      Bladder  - seen urology   stress urinary incontinence  Considering a procedure for this. Medication did not work.   Considering botox for her bladder, PTNS and interstim, Kegel, etc.      GI/Constipation/GERD   Nonformulary magnesium glycinate 100mg twice daily  Ondansetron zofran 4mg ODT - not using     ENDO -   Calcium Carbonate 600 vitamin D  400  Cholecalciferol vitamin D3 4000 units - see aboove  Fish oil-3 fatty acids 100mg - stopped  Glucosamine-chondroitin twice daily      Nodule found on thyroid and was ultrasound and had followup     Cardio/heart -      Vision - glasses     Heme  -      Other:     Right bunion surgery 2021 and recovery     Hydroxyzine vistaril - not using  Ibuprofen 200mg as needed  Ibuprofen 800mg not using     Off oxycodone since Saturday     Meclizine ever other night - on wean   Rare meloxicam mobic      Tizanidine zanaflex 4mg 1/2 every other night. - on wean     For musculoskeletal pain  Duloxetine cymbalta 60mg  Gabapentin neurontin 2 x 300mg 3/day     Medications     8am noon 330pm 1030pm   Acetaminophen tylenol 500mg  As needed         Calcium Carbonate 600 vitamin D 400   1       Carbidopa/levodopa 25/100 sinemet 1   1 1   Cholecalciferol vitamin D3 4000 units   1       Duloxetine cymbalta 60mg       1   Gabapentin neurontin 300mg 2   2 2   Glucosamine-chondroitin   1   1   Ibuprofen advil 200mg As needed         Meclizine antivert 25mg As needed      Melatonin  2.5mg      1   Meloxicam mobic 15mg As needed          Nonformulary magnesium glycinate 100mg 1   1     Probiotic  1      Sulfacetamide sodium sulfur 10-5% emulsion Not using yet      Tizanidine zanaflex 4mg  As needed                                                 Plan:    Visit with Nadir Kan to review genetic testing related to Ulcerative colitis as he father had this and ALS  Her great grandfather had tremor and there are numerous family members with tremor, autoimmune conditions    Has sweating issues. - discussed that it is unlikely related to her thyroid which was checked back 2020     Talked about sleep needs and has been having problems sleeping at night - insomnia; may have some RLS type of features - electric feeling - this can happen when trying t fall asleep >> than at 330am after falling asleep.    She is taking he sinemet at 25/100 at 1030pm and  "trying to go to bed at 11pm  Consider moving dose to 10pm  - one hour before bedtime.    She is taking gabapentin 2 x 300mg 3/day and may want to take the last dose of gabapentin sooner - 10pm rather than 1030pm    She was not anemic 1/4/2021 - no  Iron stores done    Treating insomnia can be difficult because the medications we use can be harmful, especially in older adults. In addition, sleep medications do not tend to be very effective and should only be used short-term. Cognitive behavioral therapy (CBT) is the most effective treatment for long-term insomnia. The goal of CBT for insomnia is to address the underlying causes of the sleep problems, including your habits and how you think about sleep. You can do CBT with a trained psychologist, or from the comfort of your home by following an online program or workbook. Here are some great resources for at-home CBT:    1) 6-week online CBT course through [facility] for $40: [employer]/sleep  2) \"Say Belkys to Insomnia\" by Carlos Good, PhD at [employer]: 6-week program  3) \"Overcoming Insomnia: A Cognitive-Behavioral Therapy Approach Workbook\" by Salinas Ibrahim and Louise Burrows  4) \"Quiet Your Mind and Get to Sleep: Solutions to Insomnia for Those with Depression, Anxiety or Chronic Pain ([place] [facility] Self-Help Workbook) by Louise Burrows and Bessy Thacker    Has had some more shaking and discussed as needed dose increase in sinemet or a scheduled dose increase    Return back in 3 months or so    Visit with Pat Stark Pharmacist    Coding statement:   Medical Decision Making:  #  Chronic progressive medical conditions addressed   - sweating  Review and/or interpretation of unique test or documentation from a provider outside of neurology no   Independent historian provided additional details  no  Prescription drug management and review of potential side effects and/or monitoring for side effects  yes   Health impacted " "by social determinants of health  no    I have reviewed the note as documented above.  This accurately captures the substance of my conversation with the patient and total time spent preparing for visit, executing visit and completing visit on the day of the visit:  30 minutes.      Damien Lujan MD     ______________________________________    Last visit date and details:             ______________________________________      Patient was asked about 14 Review of systems including changes in vision (dry eyes, double vision), hearing, heart, lungs, musculoskeletal, depression, anxiety, snoring, RBD, insomnia, urinary frequency, urinary urgency, constipation, swallowing problems, hematological, ID, allergies, skin problems: seborrhea, endocrinological: thyroid, diabetes, cholesterol; balance, weight changes, and other neurological problems and these were not significant at this time except for   Patient Active Problem List   Diagnosis     Family hx of ALS (amyotrophic lateral sclerosis)     Family history of Parkinsonism     Family history of dementia     Family history of ulcerative colitis     BPPV (benign paroxysmal positional vertigo)     Dysplastic nevi     Intervertebral cervical disc disorder with myelopathy, cervical region     Parkinson's disease (H)          Allergies   Allergen Reactions     Ciprofloxacin Rash     Hydrocodone-Acetaminophen Other (See Comments)     nightmares     Animal Dander Difficulty breathing     \"sneezing\"     No Clinical Screening - See Comments Difficulty breathing     \"sneezing to melon & sweet corn\"     Prochlorperazine Other (See Comments)     \" tongue goes in & out & my stomach contracts\"     Cephalexin Rash     Past Surgical History:   Procedure Laterality Date     APPENDECTOMY       FOOT SURGERY Right 2021    bunion surgery     NECK SURGERY      cervical laminectomy C56     Past Medical History:   Diagnosis Date     Family history of dementia 4/9/2020     Family history of " Parkinsonism 4/9/2020     Family history of ulcerative colitis 4/9/2020     Family hx of ALS (amyotrophic lateral sclerosis) 4/9/2020     Social History     Socioeconomic History     Marital status:      Spouse name: Not on file     Number of children: Not on file     Years of education: Not on file     Highest education level: Not on file   Occupational History     Not on file   Tobacco Use     Smoking status: Never Smoker     Smokeless tobacco: Never Used   Substance and Sexual Activity     Alcohol use: Yes     Drug use: Not on file     Sexual activity: Not on file   Other Topics Concern     Not on file   Social History Narrative     Not on file     Social Determinants of Health     Financial Resource Strain:      Difficulty of Paying Living Expenses:    Food Insecurity:      Worried About Running Out of Food in the Last Year:      Ran Out of Food in the Last Year:    Transportation Needs:      Lack of Transportation (Medical):      Lack of Transportation (Non-Medical):    Physical Activity:      Days of Exercise per Week:      Minutes of Exercise per Session:    Stress:      Feeling of Stress :    Social Connections:      Frequency of Communication with Friends and Family:      Frequency of Social Gatherings with Friends and Family:      Attends Yazdanism Services:      Active Member of Clubs or Organizations:      Attends Club or Organization Meetings:      Marital Status:    Intimate Partner Violence:      Fear of Current or Ex-Partner:      Emotionally Abused:      Physically Abused:      Sexually Abused:        Drug and lactation database from the [place] National Library of Medicine:  http://toxnet.nlm.nih.gov/cgi-bin/sis/htmlgen?LACT      B/P: Data Unavailable, T: Data Unavailable, P: Data Unavailable, R: Data Unavailable 0 lbs 0 oz  There were no vitals taken for this visit., There is no height or weight on file to calculate BMI.  Medications and Vitals not listed above were documented in the  cart and reviewed by me.     Current Outpatient Medications   Medication Sig Dispense Refill     acetaminophen (TYLENOL) 500 MG tablet Take 2 tablets (1,000 mg) by mouth daily as needed for mild pain       calcium carbonate 600 mg-vitamin D 400 units (CALTRATE) 600-400 MG-UNIT per tablet 1 tab by mouth daily @ noon 90 tablet 3     carbidopa-levodopa (SINEMET)  MG tablet 25/100 tablet by mouth 3/day @ 8am, 330pm and 930pm 270 tablet 11     Cholecalciferol 100 MCG (4000 UT) CAPS 4000 units by mouth daily @@ 1/130pm       DULoxetine (CYMBALTA) 60 MG capsule 60mg capsule by mouth daily @ 930pm 90 capsule 3     gabapentin (NEURONTIN) 300 MG capsule 2 x 300mg tab by mouth 3/day @ 8am, 330pm and 930pm = 6/day 540 capsule 3     Glucosamine-Chondroit-Vit C-Mn (GLUCOSAMINE CHONDROITIN 1500 COMPLEX) CAPS Glucosamine 1500 chondroitin 1200mg by mouth twice daily @ noon and 9pm       ibuprofen (ADVIL/MOTRIN) 200 MG tablet Take 2-3 tablets (400-600 mg) by mouth every 4 hours as needed for mild pain       meclizine (ANTIVERT) 25 MG tablet 1/2 of 25mg tab by mouth every other night @ 930pm 45 tablet 3     meloxicam (MOBIC) 15 MG tablet Take 1 tablet (15 mg) by mouth daily as needed       NONFORMULARY Magnesium glycinate 150mg by mouth twice daily @ 8am and 330pm       sulfacetamide sodium-sulfur 10-5 % EMUL Wash face twice daily       tiZANidine (ZANAFLEX) 4 MG tablet 1/2 of 4mg tablets by mouth every other day @ 930pm 45 tablet 3         Medications                                                                                                                                                  Damien Lujan MD        Again, thank you for allowing me to participate in the care of your patient.        Sincerely,        Damien Lujan MD

## 2021-07-19 NOTE — PATIENT INSTRUCTIONS
Assessment:    (G20) Parkinson's disease (H)  (primary encounter diagnosis)  Carbidopa/levodopa 25/100 sinemet tab 3/day     (Z82.0) Family hx of ALS (amyotrophic lateral sclerosis)  - father Singh Biswas  11/29/1933  (Z82.0) Family history of tremor great grand father  (Z81.8) Family history of dementia father   (Z83.79) Family history of ulcerative colitis father  Mother has had tremor   Brother has a gi problem  Sister with rheumatoid arthritis    Retired.    PD GENERATION study  Did not have any of the 7 variants in her genetic study with Dr. Kuhn    Gait/Balance/Falls     Exercise/Therapy  Walking daily  Doing big therapy  Has not been doing loud as much  Doing yoga exercises    Sarah Dooley Allison Alpers speech Gay Lenfter is her pcp through [facility]    Cognitive/Driving - denies     Mood  - okay     Hallucinations/delusions  - not     Sleep  - has wild dreams;   Melatonin 2.5mg nightly which has helped reduced dream behaviors.      Bladder  - seen urology   stress urinary incontinence  Considering a procedure for this. Medication did not work.   Considering botox for her bladder, PTNS and interstim, Kegel, etc.      GI/Constipation/GERD   Nonformulary magnesium glycinate 100mg twice daily  Ondansetron zofran 4mg ODT - not using     ENDO -   Calcium Carbonate 600 vitamin D 400  Cholecalciferol vitamin D3 4000 units - see star  Fish oil-3 fatty acids 100mg - stopped  Glucosamine-chondroitin twice daily      Nodule found on thyroid and was ultrasound and had followup     Cardio/heart -      Vision - glasses     Heme  -      Other:     Right bunion surgery 2021 and recovery     Hydroxyzine vistaril - not using  Ibuprofen 200mg as needed  Ibuprofen 800mg not using     Off oxycodone since Saturday     Meclizine ever other night - on wean   Rare meloxicam mobic      Tizanidine zanaflex 4mg 1/2 every other night. - on wean     For musculoskeletal pain  Duloxetine cymbalta 60mg  Gabapentin  neurontin 2 x 300mg 3/day     Medications     8am noon 330pm 1030pm   Acetaminophen tylenol 500mg  As needed         Calcium Carbonate 600 vitamin D 400   1       Carbidopa/levodopa 25/100 sinemet 1   1 1   Cholecalciferol vitamin D3 4000 units   1       Duloxetine cymbalta 60mg       1   Gabapentin neurontin 300mg 2   2 2   Glucosamine-chondroitin   1   1   Ibuprofen advil 200mg As needed         Meclizine antivert 25mg As needed      Melatonin  2.5mg      1   Meloxicam mobic 15mg As needed          Nonformulary magnesium glycinate 100mg 1   1     Probiotic  1      Sulfacetamide sodium sulfur 10-5% emulsion Not using yet      Tizanidine zanaflex 4mg  As needed                                                 Plan:    Visit with Nadir Kan to review genetic testing related to Ulcerative colitis as he father had this and ALS  Her great grandfather had tremor and there are numerous family members with tremor, autoimmune conditions    Has sweating issues. - discussed that it is unlikely related to her thyroid which was checked back 2020     Talked about sleep needs and has been having problems sleeping at night - insomnia; may have some RLS type of features - electric feeling - this can happen when trying t fall asleep >> than at 330am after falling asleep.    She is taking he sinemet at 25/100 at 1030pm and trying to go to bed at 11pm  Consider moving dose to 10pm  - one hour before bedtime.    She is taking gabapentin 2 x 300mg 3/day and may want to take the last dose of gabapentin sooner - 10pm rather than 1030pm    She was not anemic 1/4/2021 - no  Iron stores done    Treating insomnia can be difficult because the medications we use can be harmful, especially in older adults. In addition, sleep medications do not tend to be very effective and should only be used short-term. Cognitive behavioral therapy (CBT) is the most effective treatment for long-term insomnia. The goal of CBT for insomnia is to address the  "underlying causes of the sleep problems, including your habits and how you think about sleep. You can do CBT with a trained psychologist, or from the comfort of your home by following an online program or workbook. Here are some great resources for at-home CBT:    1) 6-week online CBT course through [facility] for $40: [email or web address]  2) \"Say Belkys to Insomnia\" by Carlos Good, PhD at [employer]: 6-week program  3) \"Overcoming Insomnia: A Cognitive-Behavioral Therapy Approach Workbook\" by Salinas Ibrahim and Louise Burrows  4) \"Quiet Your Mind and Get to Sleep: Solutions to Insomnia for Those with Depression, Anxiety or Chronic Pain ([facility] Self-Help Workbook) by Louise Burrows and Bessy Thacker    Has had some more shaking and discussed as needed dose increase in sinemet or a scheduled dose increase    Return back in 3 months or so    Visit with Pat Stark Pharmacist    "

## 2021-07-21 ENCOUNTER — TELEPHONE (OUTPATIENT)
Dept: NEUROLOGY | Facility: CLINIC | Age: 66
End: 2021-07-21

## 2021-07-21 NOTE — TELEPHONE ENCOUNTER
MTM referral from: [facility] visit (referral by provider)    MTM referral outreach attempt #2 on July 21, 2021 at 2:35 PM      Outcome: Patient not reachable after several attempts, will route to MTM Pharmacist/Provider as an FYI. Thank you for the referral.    Modesto Garcia, MTM coordinator

## 2021-09-19 ENCOUNTER — HEALTH MAINTENANCE LETTER (OUTPATIENT)
Age: 66
End: 2021-09-19

## 2021-10-11 NOTE — PROGRESS NOTES
VIDEO VISIT    Date of Visit: October 25, 2021  Name: Ne Padilla  Date of Birth 1955    lives in [place]  [phone] (M)  [phone] (H)  email listed - quinn set up  No active proxy  Mark spouse  [phone] home  [email or web address]  [phone]         [phone] - use this     7 grand kids - busy   On the [place]    Visit with Nadir Kan to review genetic testing related to Ulcerative colitis as he father had this and ALS  Her great grandfather had tremor and there are numerous family members with tremor, autoimmune conditions      She was not anemic 1/4/2021 - no  Iron stores done      Assessment:  (G20) Parkinson's disease (H)  (primary encounter diagnosis)  Carbidopa/levodopa 25/100 sinemet tab 3/day and 1 prn     (Z82.0) Family hx of ALS (amyotrophic lateral sclerosis)  - father Singh Biswas  [birth year]1933  (Z82.0) Family history of tremor great grand father  (Z81.8) Family history of dementia father   (Z83.79) Family history of ulcerative colitis father  Mother has had tremor   Brother has a gi problem  Sister with rheumatoid arthritis  Retired.     PD GENERATION study  Did not have any of the 7 variants in her genetic study with Dr. Kuhn    Review of diagnosis    Parkinson    Avoidance of dopamine blockers   Not taking    Motor complication review   1 prn sinemet on days active    Review of Impulse control disorders   No     Review of surgical or medication options   yes    Gait/Balance/Falls   Had one fall last night when her foot feel asleep and got up and her foot collapsed and had partial fall    Exercise/Therapy performed/offered   Walking  Doing exercises  Consider doing yoga  [email or web address]    Sarah Dooley Allison Alpers speech      Sary Sol is her pcp through [facility] [place]     Cognitive/Driving - denies     Mood  -   Stress  Brother in eating program - Deb - very low BMI- in and out Muslim  Other stressor with family member with  separation     Hallucinations/delusions  - not     Sleep  - has wild dreams;   Melatonin 5mg nightly which has helped reduced dream behaviors.     Sleep is better    Talked about sleep needs and has been having problems sleeping at night - insomnia; may have some RLS type of features - electric feeling - this can happen when trying t fall asleep >> than at 330am after falling asleep.     Bladder  - seen urology   stress urinary incontinence  Considering a procedure for this. Medication did not work.   Considering botox for her bladder, PTNS and interstim, Kegel, etc.     Will see urology after foot surgery  Rober     GI/Constipation/GERD   Nonformulary magnesium glycinate 100mg twice daily  Ondansetron zofran 4mg ODT - not using     ENDO -   Calcium Carbonate 600 vitamin D 400  Cholecalciferol vitamin D3 4000 units - see aboove  Fish oil-3 fatty acids 100mg - stopped  Glucosamine-chondroitin twice daily      Nodule found on thyroid and was ultrasound and had followup    Has sweating issues. - discussed that it is unlikely related to her thyroid which was checked back 2020      Cardio/heart -      Vision - glasses     Heme  -      Other:     Right bunion surgery 2021   Left bunion surgery Friday     Ibuprofen 200mg as needed    Meclizine ever other night - on wean   Rare meloxicam mobic      Tizanidine zanaflex 4mg 1/2 every other night. - not using     For musculoskeletal pain  Duloxetine cymbalta 60mg  Gabapentin neurontin 2 x 300mg 3/day    Bunion on the other foot being done this Friday    Dr. Yasmeen [place]ez[place] foot surgeon     Medications     8am noon 330pm 10pm   Acetaminophen tylenol 500mg  As needed         Calcium Carbonate 600 vitamin D 400   1       Carbidopa/levodopa 25/100 sinemet 1   1 1 + prn   Cholecalciferol vitamin D3 4000 units   1       Duloxetine cymbalta 60mg       1   Gabapentin neurontin 300mg 2   2 2   Glucosamine-chondroitin   2   1   Ibuprofen advil 200mg As needed         Magnesium  "glycinate 100mg See below      Meclizine antivert 25mg As needed         Melatonin  5mg       1   Meloxicam mobic 15mg As needed          Nonformulary magnesium glycinate 100mg 1   1     Probiotic  1         Sulfacetamide sodium sulfur 10-5% emulsion May start         Tizanidine zanaflex 4mg  stopped                                                 Plan:    Doing well on present medication regimen    Planned bunion surgery this week    Return in 3-6 months    Medical Decision Making:  #  Chronic progressive medical conditions addressed  -parkinson, feet issues, etc.   Review and/or interpretation of unique test or documentation from a provider outside of neurology no   Independent historian provided additional details  no   Prescription drug management and review of potential side effects and/or monitoring for side effects  yes   Health impacted by social determinants of health  no    I have reviewed the note as documented above.  This accurately captures the substance of my conversation with the patient and total time spent preparing for visit, executing visit and completing visit on the day of the visit:  30 minutes.     Video visit: 1136am - 1209pm    Damien Lujan MD      ------------------------------------------------------------------------------------------------------------------------------------------------------------------------    Video-Visit Details    The patient has been notified of following:     \"After a review of the patient s situation, this visit was changed from an in-person visit to a video visit to reduce the risk of COVID-19 exposure.   The patient is being evaluated via a billable video visit.\"    \"This video visit will be conducted via a call between you and your physician/provider. We have found that certain health care needs can be provided without the need for an in-person physical exam.  This service lets us provide the care you need with a video conversation.  If a prescription is " "necessary we can send it directly to your pharmacy.  If lab work is needed we can place an order for that and you can then stop by our lab to have the test done at a later time.    If during the course of the call the physician/provider feels a video visit is not appropriate, you will not be charged for this service.\"     Patient has given verbal consent for Video visit? Yes    Patient would like the video invitation sent by:     Type of service:  Video Visit    Video Start Time:     Video End Time (time video stopped):     Duration:  minutes - see above    Originating Location (pt. Location):     Distant Location (provider location):  [facility] [place]     Mode of Communication:  Video Conference via [employer] (and if not possible then doximity)      Damine Lujan MD      --------------------------------------------------------------------------------------------------------------    Ne Padilla is a 65 year old female who is being evaluated via a billable video visit.      Charts reviewed  Consult from  Images reviewed        I have reviewed and updated the patient's Past Medical History, Social History, Family History and Medication List.    ALLERGIES  Ciprofloxacin, Hydrocodone-acetaminophen, Animal dander, No clinical screening - see comments, Prochlorperazine, and Cephalexin    Lasts visit details if there was a last visit:       14 Review of systems  are negative except for   Patient Active Problem List   Diagnosis     Family hx of ALS (amyotrophic lateral sclerosis)     Family history of parkinsonism     Family history of dementia     Family history of ulcerative colitis     BPPV (benign paroxysmal positional vertigo)     Dysplastic nevi     Intervertebral cervical disc disorder with myelopathy, cervical region     Parkinson's disease (H)        Allergies   Allergen Reactions     Ciprofloxacin Rash     Hydrocodone-Acetaminophen Other (See Comments)     nightmares     Animal " "Dander Difficulty breathing     \"sneezing\"     No Clinical Screening - See Comments Difficulty breathing     \"sneezing to melon & sweet corn\"     Prochlorperazine Other (See Comments)     \" tongue goes in & out & my stomach contracts\"     Cephalexin Rash     Past Surgical History:   Procedure Laterality Date     APPENDECTOMY       FOOT SURGERY Right 2021    bunion surgery     NECK SURGERY      cervical laminectomy C56     Past Medical History:   Diagnosis Date     Family history of dementia 04/09/2020     Family history of tremor     great grandfather     Family history of ulcerative colitis 04/09/2020     Family hx of ALS (amyotrophic lateral sclerosis) 04/09/2020     Social History     Socioeconomic History     Marital status:      Spouse name: Not on file     Number of children: Not on file     Years of education: Not on file     Highest education level: Not on file   Occupational History     Not on file   Tobacco Use     Smoking status: Never Smoker     Smokeless tobacco: Never Used   Substance and Sexual Activity     Alcohol use: Yes     Drug use: Not on file     Sexual activity: Not on file   Other Topics Concern     Not on file   Social History Narrative     Not on file     Social Determinants of Health     Financial Resource Strain:      Difficulty of Paying Living Expenses:    Food Insecurity:      Worried About Running Out of Food in the Last Year:      Ran Out of Food in the Last Year:    Transportation Needs:      Lack of Transportation (Medical):      Lack of Transportation (Non-Medical):    Physical Activity:      Days of Exercise per Week:      Minutes of Exercise per Session:    Stress:      Feeling of Stress :    Social Connections:      Frequency of Communication with Friends and Family:      Frequency of Social Gatherings with Friends and Family:      Attends Worship Services:      Active Member of Clubs or Organizations:      Attends Club or Organization Meetings:      Marital Status:  "   Intimate Partner Violence:      Fear of Current or Ex-Partner:      Emotionally Abused:      Physically Abused:      Sexually Abused:      Family History   Problem Relation Age of Onset     Neurologic Disorder Father      ALS Father      Parkinsonism Father      Dementia Father      Ulcerative Colitis Father      Other - See Comments Father         seen [facility]     Prostate Cancer Father      Cancer Mother         oral     Tremor Mother         lives in [place] in summer and [place] in winter     Other - See Comments Mother      Rheumatoid Arthritis Sister      GI problems Brother         ? ulcerative colitis     Tremor Maternal Grandfather      Other - See Comments Daughter      Other - See Comments Daughter      Other - See Comments Daughter      Tremor Other         maternal great grandfather     Current Outpatient Medications   Medication Sig Dispense Refill     acetaminophen (TYLENOL) 500 MG tablet Take 2 tablets (1,000 mg) by mouth daily as needed for mild pain       calcium carbonate 600 mg-vitamin D 400 units (CALTRATE) 600-400 MG-UNIT per tablet 1 tab by mouth daily @ noon 90 tablet 3     carbidopa-levodopa (SINEMET)  MG tablet 25/100 tablet by mouth 3/day @ 8am, 330pm and 1030pm and 1 tab as needed 360 tablet 11     Cholecalciferol 100 MCG (4000 UT) CAPS 4000 units by mouth daily @@ 1/130pm       DULoxetine (CYMBALTA) 60 MG capsule 60mg capsule by mouth daily @ 930pm 90 capsule 3     gabapentin (NEURONTIN) 300 MG capsule 2 x 300mg tab by mouth 3/day @ 8am, 330pm and 1030pm = 6/day 540 capsule 3     Glucosamine-Chondroit-Vit C-Mn (GLUCOSAMINE CHONDROITIN 1500 COMPLEX) CAPS Glucosamine 1500 chondroitin 1200mg by mouth twice daily @ noon and 9pm       ibuprofen (ADVIL/MOTRIN) 200 MG tablet Take 2-3 tablets (400-600 mg) by mouth every 4 hours as needed for mild pain       MAGNESIUM GLYCINATE PO Take 100 mg by mouth 2 times daily       meclizine (ANTIVERT) 25 MG tablet 1/2 to 1 tab of 25mg by  mouth daily as needed       Melatonin 2.5 MG CHEW Take 1 tablet by mouth At Bedtime       meloxicam (MOBIC) 15 MG tablet Take 1 tablet (15 mg) by mouth daily as needed       NONFORMULARY Magnesium glycinate 100mg at 8am and 330pm       Probiotic Product (PROBIOTIC DAILY PO) Take by mouth daily       sulfacetamide sodium-sulfur 10-5 % LOTN        tiZANidine (ZANAFLEX) 4 MG tablet 1/2 tab by mouth as needed at 930pm

## 2021-10-13 ENCOUNTER — VIRTUAL VISIT (OUTPATIENT)
Dept: PHARMACY | Facility: CLINIC | Age: 66
End: 2021-10-13
Attending: PSYCHIATRY & NEUROLOGY
Payer: COMMERCIAL

## 2021-10-13 DIAGNOSIS — G47.52 RBD (REM BEHAVIORAL DISORDER): ICD-10-CM

## 2021-10-13 DIAGNOSIS — H81.10 BENIGN PAROXYSMAL POSITIONAL VERTIGO, UNSPECIFIED LATERALITY: ICD-10-CM

## 2021-10-13 DIAGNOSIS — M50.00 INTERVERTEBRAL CERVICAL DISC DISORDER WITH MYELOPATHY, CERVICAL REGION: ICD-10-CM

## 2021-10-13 DIAGNOSIS — M19.90 ARTHRITIS: ICD-10-CM

## 2021-10-13 DIAGNOSIS — G20.A1 PARKINSON'S DISEASE (H): Primary | ICD-10-CM

## 2021-10-13 DIAGNOSIS — Z78.9 TAKES DIETARY SUPPLEMENTS: ICD-10-CM

## 2021-10-13 PROCEDURE — 99607 MTMS BY PHARM ADDL 15 MIN: CPT | Performed by: PHARMACIST

## 2021-10-13 PROCEDURE — 99605 MTMS BY PHARM NP 15 MIN: CPT | Performed by: PHARMACIST

## 2021-10-13 NOTE — PATIENT INSTRUCTIONS
Recommendations from today's MTM visit:                                                      1. You may increase melatonin to 5 mg nightly for vivid dreams. This medication may be increased further up to 10-12 mg nightly if needed to control dream enactment.    2. We discussed that you may continue to use carbidopa-levodopa 1 tablet as needed. If it seems you are needing the extra tablet more often, or overall symptoms are affecting you more (ie: tremor, stiffness of muscles, slowness of movement), we may increase carbidopa-levodopa to 1.5 tablets 3 times/day. Please contact us if your symptoms are changing and we can discuss this.     Follow-up: Return in about 1 year (around 10/13/2022) for Medication Therapy Management.    It was great to speak with you today.  I value your experience and would be very thankful for your time with providing feedback on our clinic survey. You may receive a survey via email or text message in the next few days.     To schedule another MTM appointment, please call the clinic directly or you may call the MTM scheduling line at [phone] or toll-free at [phone].     My Clinical Pharmacist's contact information:                                                      Please feel free to contact me with any questions or concerns you have.      Pat Perez (Roy), Pharm.D.  Medication Therapy Management Pharmacist  [facility]

## 2021-10-15 PROBLEM — R29.2 ABNORMAL REFLEX: Status: ACTIVE | Noted: 2017-08-09

## 2021-10-15 PROBLEM — G95.9 DISEASE OF SPINAL CORD, UNSPECIFIED (H): Status: ACTIVE | Noted: 2017-06-29

## 2021-10-25 ENCOUNTER — VIRTUAL VISIT (OUTPATIENT)
Dept: NEUROLOGY | Facility: CLINIC | Age: 66
End: 2021-10-25
Payer: COMMERCIAL

## 2021-10-25 DIAGNOSIS — G20.A1 PARKINSON'S DISEASE (H): Primary | ICD-10-CM

## 2021-10-25 DIAGNOSIS — G20.C PARKINSONISM, UNSPECIFIED PARKINSONISM TYPE (H): ICD-10-CM

## 2021-10-25 PROBLEM — R32 URINARY INCONTINENCE: Status: ACTIVE | Noted: 2021-10-14

## 2021-10-25 PROCEDURE — 99214 OFFICE O/P EST MOD 30 MIN: CPT | Mod: 95 | Performed by: PSYCHIATRY & NEUROLOGY

## 2021-10-25 RX ORDER — CARBIDOPA AND LEVODOPA 25; 100 MG/1; MG/1
TABLET ORAL
Qty: 360 TABLET | Refills: 11 | Status: SHIPPED | OUTPATIENT
Start: 2021-10-25 | End: 2023-01-03

## 2021-10-25 RX ORDER — DULOXETIN HYDROCHLORIDE 60 MG/1
60 CAPSULE, DELAYED RELEASE ORAL EVERY MORNING
Qty: 90 CAPSULE | Refills: 3 | COMMUNITY
Start: 2021-10-25

## 2021-10-25 RX ORDER — HYDROCORTISONE ACETATE 0.5 %
1 CREAM (GRAM) TOPICAL DAILY
COMMUNITY
Start: 2021-10-25

## 2021-10-25 RX ORDER — MELATONIN 5 MG
TABLET,CHEWABLE ORAL
COMMUNITY
End: 2024-08-14

## 2021-10-25 RX ORDER — GABAPENTIN 300 MG/1
600 CAPSULE ORAL 3 TIMES DAILY
Qty: 540 CAPSULE | Refills: 3 | COMMUNITY
Start: 2021-10-25

## 2021-10-25 RX ORDER — SULFACETAMIDE SODIUM, SULFUR 100; 50 MG/G; MG/G
SUSPENSION TOPICAL
COMMUNITY
Start: 2021-01-12 | End: 2024-01-08

## 2021-10-25 NOTE — PROGRESS NOTES
Ne is a 66 year old who is being evaluated via a billable video visit.      How would you like to obtain your AVS? MyChart  If the video visit is dropped, the invitation should be resent by: Text to cell phone: [phone]  Will anyone else be joining your video visit? No

## 2021-10-25 NOTE — LETTER
10/25/2021         RE: Ne Padilla  [address] [facility] [address]        Dear Colleague,    Thank you for referring your patient, Ne Padilla, to the [facility] [place]. Please see a copy of my visit note below.        VIDEO VISIT    Date of Visit: October 25, 2021  Name: Ne Padilla  Date of Birth 1955    lives in [place]  [phone] (M)  [phone] (H)  email listed - anandat set up  No active proxy  Mark spouse  [phone] home  [email or web address]  [phone]         [phone] - use this     7 grand kids - busy   On the [place]    Visit with Nadir Kan to review genetic testing related to Ulcerative colitis as he father had this and ALS  Her great grandfather had tremor and there are numerous family members with tremor, autoimmune conditions      She was not anemic 1/4/2021 - no  Iron stores done      Assessment:  (G20) Parkinson's disease (H)  (primary encounter diagnosis)  Carbidopa/levodopa 25/100 sinemet tab 3/day and 1 prn     (Z82.0) Family hx of ALS (amyotrophic lateral sclerosis)  - father Singh Biswas  [birth year]1933  (Z82.0) Family history of tremor great grand father  (Z81.8) Family history of dementia father   (Z83.79) Family history of ulcerative colitis father  Mother has had tremor   Brother has a gi problem  Sister with rheumatoid arthritis  Retired.     PD GENERATION study  Did not have any of the 7 variants in her genetic study with Dr. Kuhn    Review of diagnosis    Parkinson    Avoidance of dopamine blockers   Not taking    Motor complication review   1 prn sinemet on days active    Review of Impulse control disorders   No     Review of surgical or medication options   yes    Gait/Balance/Falls   Had one fall last night when her foot feel asleep and got up and her foot collapsed and had partial fall    Exercise/Therapy performed/offered   Walking  Doing exercises  Consider doing yoga  [email or web address]    Maddy Collins    Allison Alpers speech      Sary Sol is her pcp through [facility]     Cognitive/Driving - denies     Mood  -   Stress  Brother in eating program - Deb - very low BMI- in and out [facility]  Other stressor with family member with separation     Hallucinations/delusions  - not     Sleep  - has wild dreams;   Melatonin 5mg nightly which has helped reduced dream behaviors.     Sleep is better    Talked about sleep needs and has been having problems sleeping at night - insomnia; may have some RLS type of features - electric feeling - this can happen when trying t fall asleep >> than at 330am after falling asleep.     Bladder  - seen urology   stress urinary incontinence  Considering a procedure for this. Medication did not work.   Considering botox for her bladder, PTNS and interstim, Kegel, etc.     Will see urology after foot surgery  Rober     GI/Constipation/GERD   Nonformulary magnesium glycinate 100mg twice daily  Ondansetron zofran 4mg ODT - not using     ENDO -   Calcium Carbonate 600 vitamin D 400  Cholecalciferol vitamin D3 4000 units - see aboove  Fish oil-3 fatty acids 100mg - stopped  Glucosamine-chondroitin twice daily      Nodule found on thyroid and was ultrasound and had followup    Has sweating issues. - discussed that it is unlikely related to her thyroid which was checked back 2020      Cardio/heart -      Vision - glasses     Heme  -      Other:     Right bunion surgery 2021   Left bunion surgery Friday     Ibuprofen 200mg as needed    Meclizine ever other night - on wean   Rare meloxicam mobic      Tizanidine zanaflex 4mg 1/2 every other night. - not using     For musculoskeletal pain  Duloxetine cymbalta 60mg  Gabapentin neurontin 2 x 300mg 3/day    Bunion on the other foot being done this Friday    Dr. Yasmeen Moctezuma foot surgeon     Medications     8am noon 330pm 10pm   Acetaminophen tylenol 500mg  As needed         Calcium Carbonate 600 vitamin D 400   1      "  Carbidopa/levodopa 25/100 sinemet 1   1 1 + prn   Cholecalciferol vitamin D3 4000 units   1       Duloxetine cymbalta 60mg       1   Gabapentin neurontin 300mg 2   2 2   Glucosamine-chondroitin   2   1   Ibuprofen advil 200mg As needed         Magnesium glycinate 100mg See below      Meclizine antivert 25mg As needed         Melatonin  5mg       1   Meloxicam mobic 15mg As needed          Nonformulary magnesium glycinate 100mg 1   1     Probiotic  1         Sulfacetamide sodium sulfur 10-5% emulsion May start         Tizanidine zanaflex 4mg  stopped                                                 Plan:    Doing well on present medication regimen    Planned bunion surgery this week    Return in 3-6 months    Medical Decision Making:  #  Chronic progressive medical conditions addressed  -parkinson, feet issues, etc.   Review and/or interpretation of unique test or documentation from a provider outside of neurology no   Independent historian provided additional details  no   Prescription drug management and review of potential side effects and/or monitoring for side effects  yes   Health impacted by social determinants of health  no    I have reviewed the note as documented above.  This accurately captures the substance of my conversation with the patient and total time spent preparing for visit, executing visit and completing visit on the day of the visit:  30 minutes.     Video visit: 1136am - 1209pm    Damien Lujan MD      ------------------------------------------------------------------------------------------------------------------------------------------------------------------------    Video-Visit Details    The patient has been notified of following:     \"After a review of the patient s situation, this visit was changed from an in-person visit to a video visit to reduce the risk of COVID-19 exposure.   The patient is being evaluated via a billable video visit.\"    \"This video visit will be conducted via a " "call between you and your physician/provider. We have found that certain health care needs can be provided without the need for an in-person physical exam.  This service lets us provide the care you need with a video conversation.  If a prescription is necessary we can send it directly to your pharmacy.  If lab work is needed we can place an order for that and you can then stop by our lab to have the test done at a later time.    If during the course of the call the physician/provider feels a video visit is not appropriate, you will not be charged for this service.\"     Patient has given verbal consent for Video visit? Yes    Patient would like the video invitation sent by:     Type of service:  Video Visit    Video Start Time:     Video End Time (time video stopped):     Duration:  minutes - see above    Originating Location (pt. Location):     Distant Location (provider location):  [facility]     Mode of Communication:  Video Conference via [employer] (and if not possible then doximity)      Damien Lujan MD      --------------------------------------------------------------------------------------------------------------    Ne Padilla is a 65 year old female who is being evaluated via a billable video visit.      Charts reviewed  Consult from  Images reviewed        I have reviewed and updated the patient's Past Medical History, Social History, Family History and Medication List.    ALLERGIES  Ciprofloxacin, Hydrocodone-acetaminophen, Animal dander, No clinical screening - see comments, Prochlorperazine, and Cephalexin    Lasts visit details if there was a last visit:       14 Review of systems  are negative except for   Patient Active Problem List   Diagnosis     Family hx of ALS (amyotrophic lateral sclerosis)     Family history of parkinsonism     Family history of dementia     Family history of ulcerative colitis     BPPV (benign paroxysmal positional vertigo)     " "Dysplastic nevi     Intervertebral cervical disc disorder with myelopathy, cervical region     Parkinson's disease (H)        Allergies   Allergen Reactions     Ciprofloxacin Rash     Hydrocodone-Acetaminophen Other (See Comments)     nightmares     Animal Dander Difficulty breathing     \"sneezing\"     No Clinical Screening - See Comments Difficulty breathing     \"sneezing to melon & sweet corn\"     Prochlorperazine Other (See Comments)     \" tongue goes in & out & my stomach contracts\"     Cephalexin Rash     Past Surgical History:   Procedure Laterality Date     APPENDECTOMY       FOOT SURGERY Right 2021    bunion surgery     NECK SURGERY      cervical laminectomy C56     Past Medical History:   Diagnosis Date     Family history of dementia 04/09/2020     Family history of tremor     great grandfather     Family history of ulcerative colitis 04/09/2020     Family hx of ALS (amyotrophic lateral sclerosis) 04/09/2020     Social History     Socioeconomic History     Marital status:      Spouse name: Not on file     Number of children: Not on file     Years of education: Not on file     Highest education level: Not on file   Occupational History     Not on file   Tobacco Use     Smoking status: Never Smoker     Smokeless tobacco: Never Used   Substance and Sexual Activity     Alcohol use: Yes     Drug use: Not on file     Sexual activity: Not on file   Other Topics Concern     Not on file   Social History Narrative     Not on file     Social Determinants of Health     Financial Resource Strain:      Difficulty of Paying Living Expenses:    Food Insecurity:      Worried About Running Out of Food in the Last Year:      Ran Out of Food in the Last Year:    Transportation Needs:      Lack of Transportation (Medical):      Lack of Transportation (Non-Medical):    Physical Activity:      Days of Exercise per Week:      Minutes of Exercise per Session:    Stress:      Feeling of Stress :    Social Connections:      " Frequency of Communication with Friends and Family:      Frequency of Social Gatherings with Friends and Family:      Attends Sikh Services:      Active Member of Clubs or Organizations:      Attends Club or Organization Meetings:      Marital Status:    Intimate Partner Violence:      Fear of Current or Ex-Partner:      Emotionally Abused:      Physically Abused:      Sexually Abused:      Family History   Problem Relation Age of Onset     Neurologic Disorder Father      ALS Father      Parkinsonism Father      Dementia Father      Ulcerative Colitis Father      Other - See Comments Father         seen at [facility]     Prostate Cancer Father      Cancer Mother         oral     Tremor Mother         lives in [place] in summer and [place] in winter     Other - See Comments Mother      Rheumatoid Arthritis Sister      GI problems Brother         ? ulcerative colitis     Tremor Maternal Grandfather      Other - See Comments Daughter      Other - See Comments Daughter      Other - See Comments Daughter      Tremor Other         maternal great grandfather     Current Outpatient Medications   Medication Sig Dispense Refill     acetaminophen (TYLENOL) 500 MG tablet Take 2 tablets (1,000 mg) by mouth daily as needed for mild pain       calcium carbonate 600 mg-vitamin D 400 units (CALTRATE) 600-400 MG-UNIT per tablet 1 tab by mouth daily @ noon 90 tablet 3     carbidopa-levodopa (SINEMET)  MG tablet 25/100 tablet by mouth 3/day @ 8am, 330pm and 1030pm and 1 tab as needed 360 tablet 11     Cholecalciferol 100 MCG (4000 UT) CAPS 4000 units by mouth daily @@ 1/130pm       DULoxetine (CYMBALTA) 60 MG capsule 60mg capsule by mouth daily @ 930pm 90 capsule 3     gabapentin (NEURONTIN) 300 MG capsule 2 x 300mg tab by mouth 3/day @ 8am, 330pm and 1030pm = 6/day 540 capsule 3     Glucosamine-Chondroit-Vit C-Mn (GLUCOSAMINE CHONDROITIN 1500 COMPLEX) CAPS Glucosamine 1500 chondroitin 1200mg by mouth twice daily @ noon and  9pm       ibuprofen (ADVIL/MOTRIN) 200 MG tablet Take 2-3 tablets (400-600 mg) by mouth every 4 hours as needed for mild pain       MAGNESIUM GLYCINATE PO Take 100 mg by mouth 2 times daily       meclizine (ANTIVERT) 25 MG tablet 1/2 to 1 tab of 25mg by mouth daily as needed       Melatonin 2.5 MG CHEW Take 1 tablet by mouth At Bedtime       meloxicam (MOBIC) 15 MG tablet Take 1 tablet (15 mg) by mouth daily as needed       NONFORMULARY Magnesium glycinate 100mg at 8am and 330pm       Probiotic Product (PROBIOTIC DAILY PO) Take by mouth daily       sulfacetamide sodium-sulfur 10-5 % LOTN        tiZANidine (ZANAFLEX) 4 MG tablet 1/2 tab by mouth as needed at 930pm             Ne is a 66 year old who is being evaluated via a billable video visit.      How would you like to obtain your AVS? MyChart  If the video visit is dropped, the invitation should be resent by: Text to cell phone: [phone]  Will anyone else be joining your video visit? No            Again, thank you for allowing me to participate in the care of your patient.        Sincerely,        Damien Lujan MD

## 2022-01-08 ENCOUNTER — HEALTH MAINTENANCE LETTER (OUTPATIENT)
Age: 67
End: 2022-01-08

## 2022-11-20 ENCOUNTER — HEALTH MAINTENANCE LETTER (OUTPATIENT)
Age: 67
End: 2022-11-20

## 2022-12-24 ENCOUNTER — HEALTH MAINTENANCE LETTER (OUTPATIENT)
Age: 67
End: 2022-12-24

## 2022-12-31 DIAGNOSIS — G20.C PARKINSONISM, UNSPECIFIED PARKINSONISM TYPE (H): ICD-10-CM

## 2023-01-02 NOTE — TELEPHONE ENCOUNTER
Rx Authorization:    Requested Medication/ Dose carbidopa 25 mg-levodopa 100 mg tablet (SINEMET)    Date last refill ordered: 10/25//21    Quantity ordered: 360 tabs    # refills: 11    Date of last clinic visit with ordering provider: 10/25/21    Date of next clinic visit with ordering provider:     All pertinent protocol data (lab date/result):     Include pertinent information from patients message:

## 2023-01-03 RX ORDER — CARBIDOPA AND LEVODOPA 25; 100 MG/1; MG/1
TABLET ORAL
Qty: 360 TABLET | Refills: 11 | Status: SHIPPED | OUTPATIENT
Start: 2023-01-03 | End: 2023-07-31

## 2023-01-03 NOTE — TELEPHONE ENCOUNTER
Pt confirmed that she is still taking her carbidopa/levodopa 25/100mg as ordered (1 tab TID and 1 tab PRN). She has not seen Dr. Lujan in over 1 year and follow-up appt scheduled for next available in June. Prescription will be sent to provide to review and sign.     Pt also reported that she may have her gabapentin dose increased but was concerned if this would affect her PD. Routed to provider to advise and writer will relay information back to patient.        Ashely Simpson, JADCC  Neurology/Neurosurgery/PM&R

## 2023-01-13 NOTE — TELEPHONE ENCOUNTER
Per pharmacist, there are no specific concerns with increasing gabapentin dose in PD patients besides usual side effects (drowsiness, dizziness, etc). She may discuss this with her PCP who is currently managing her gabapentin and increase dose if appropriate. Pt verbalized understanding.       Ashely Simpson, JADCC  Neurology/Neurosurgery/PM&R

## 2023-03-14 ENCOUNTER — TELEPHONE (OUTPATIENT)
Dept: NEUROLOGY | Facility: CLINIC | Age: 68
End: 2023-03-14
Payer: COMMERCIAL

## 2023-03-14 NOTE — TELEPHONE ENCOUNTER
Left Voicemail (1st Attempt) for the patient to call back and schedule the following:    Appointment type: Return  Provider: Dr. Lujan  Return date: next available  Specialty phone number: [phone]  Additional appointment(s) needed:   Additonal Notes:     Dr. Lujan not available on 6/12/2023, appointment needs to be rescheduled.    Also sent a [facility] [employer] message.    Haylie R/Procedure    [facility]   Neurology, NeuroSurgery, NeuroPsychology and Pain Management Specialties  Medical/Surgical Adult Specialties

## 2023-04-15 ENCOUNTER — HEALTH MAINTENANCE LETTER (OUTPATIENT)
Age: 68
End: 2023-04-15

## 2023-07-18 RX ORDER — METHOCARBAMOL 500 MG/1
TABLET, FILM COATED ORAL
COMMUNITY
Start: 2023-02-15 | End: 2023-07-31

## 2023-07-18 RX ORDER — HYDROXYZINE HYDROCHLORIDE 25 MG/1
TABLET, FILM COATED ORAL
COMMUNITY
Start: 2023-02-11 | End: 2023-07-31

## 2023-07-18 RX ORDER — ALPRAZOLAM 0.5 MG
TABLET ORAL
COMMUNITY
Start: 2022-12-07 | End: 2023-07-31

## 2023-07-18 RX ORDER — ONDANSETRON 4 MG/1
TABLET, ORALLY DISINTEGRATING ORAL
COMMUNITY
Start: 2023-03-08 | End: 2023-07-31

## 2023-07-18 RX ORDER — OXYCODONE HYDROCHLORIDE 5 MG/1
TABLET ORAL
COMMUNITY
Start: 2023-02-16 | End: 2023-07-31

## 2023-07-18 NOTE — PROGRESS NOTES
Diagnosis/Summary/Recommendations:    PATIENT: Ne Padilla  67 year old female     : 1955    PARESH: 2023    MRN: [id number]  [address]   [facility] [address]     [phone] (M)   [phone] (H)       lives in [place]  [phone] (M)  [phone] (H)  email listed - aanndat set up  No active proxy  Mark spouse  [phone] home  [email or web address]  [phone]         [phone] - use this     7 grand kids - busy   On the [place]     Visit with Nadir Kan to review genetic testing related to Ulcerative colitis as he father had this and ALS  Her great grandfather had tremor and there are numerous family members with tremor, autoimmune conditions      She was not anemic 2021 - no  [employer] stores done        Assessment:  (G20) Parkinson's disease (H)  (primary encounter diagnosis)  Carbidopa/levodopa 25/100 sinemet tab 3/day and 1 prn     (Z82.0) Family hx of ALS (amyotrophic lateral sclerosis)  - father Singh Biswas  1933  (Z82.0) Family history of tremor great grand father  (Z81.8) Family history of dementia father   (Z83.79) Family history of ulcerative colitis father  Mother has had tremor   Brother has a gi problem  Sister with rheumatoid arthritis  Retired.     PD GENERATION study  Did not have any of the 7 variants in her genetic study with Dr. Kuhn     Review of diagnosis    Parkinson     Avoidance of dopamine blockers   Not taking     Motor complication review   1 prn sinemet on days active     Review of Impulse control disorders   No      Review of surgical or medication options   yes     Gait/Balance/Falls   Had one fall last night when her foot feel asleep and got up and her foot collapsed and had partial fall     Exercise/Therapy performed/offered   Walking  Doing exercises  Consider doing yoga  [email or web address]     Sarah Dooley Allison Alpers speech      Sary Sol is her pcp through [facility]     Cognitive/Driving - denies     Mood  -    Stress  Brother in eating program - Deb - very low BMI- in and out [employer]  Other stressor with family member with separation     Hallucinations/delusions  - not     Sleep  - has wild dreams;   Melatonin 5mg nightly which has helped reduced dream behaviors.      Sleep is better     Talked about sleep needs and has been having problems sleeping at night - insomnia; may have some RLS type of features - electric feeling - this can happen when trying t fall asleep >> than at 330am after falling asleep.     Bladder  - seen urology   stress urinary incontinence  Considering a procedure for this. Medication did not work.   Considering botox for her bladder, PTNS and interstim, Kegel, etc.      Will see urology after foot surgery  [place]     GI/Constipation/GERD   Nonformulary magnesium glycinate 100mg twice daily  Ondansetron zofran 4mg ODT - not using     ENDO -   Calcium Carbonate 600 vitamin D 400  Cholecalciferol vitamin D3 4000 units - see aboove  Fish oil-3 fatty acids 100mg - stopped  Glucosamine-chondroitin twice daily      Nodule found on thyroid and was ultrasound and had followup     Has sweating issues. - discussed that it is unlikely related to her thyroid which was checked back       Cardio/heart -      2023   Pat Name:     ANTONIA NEGRETE              Department:   [facility]   Patient ID:   [id number]                    Room:           Gender:       F                        Technician:     :          1955               Requested By: JONNATHAN STEEL MD   Order Number: [id number]                Reading MD:   GUANAKO Chery                                    Measurements   Intervals                              Axis            Rate:         80                       P:            64   NJ:           173                      QRS:          -5   QRSD:         73                       T:            63   QT:           310                                      QTc:          358                                                                  Interpretive Statements   SINUS RHYTHM WITH SINUS ARRHYTHMIA   POSSIBLE LEFT ATRIAL ENLARGEMENT [-0.1mV P WAVE IN V1/V2]   NONSPECIFIC T-WAVE ABNORMALITY   Compared to ECG 01/04/2021 16:15:00   T-wave abnormality now present   Electronically Signed On 2-1-2023 10:39:18 CST by GUANAKO Chery      Vision - glasses     Heme  -      Other:     Right bunion surgery 2021   Left bunion surgery Friday      Ibuprofen 200mg as needed     Meclizine ever other night - on wean   Rare meloxicam mobic      Tizanidine zanaflex 4mg 1/2 every other night. - not using     For musculoskeletal pain  Duloxetine cymbalta 60mg  Gabapentin neurontin 2 x 300mg 3/day     Bunion on the other foot being done this Friday     Dr. Yasmeen Moctezuma foot surgeon     Medications     8am noon 3pm 10pm   Acetaminophen tylenol 500mg  prn         Alprazolam xanax 0.5mg off      Calcium Carbonate 600 vitamin D 400   1       Carbidopa/levodopa 25/100 sinemet 1   2 1  and prn   Cholecalciferol vitamin D3 4000 units   1       Duloxetine cymbalta 60mg  1        Fluoxetine prozac 20mg    1   Gabapentin neurontin 300mg 2   2 2   Glucosamine-chondroitin   2   1   Hydroxyzine atarax 25mg no      Ibuprofen advil 200mg prn         Magnesium glycinate 100mg below         Meclizine antivert 25mg 1/2 prn         Melatonin  5mg       2   Meloxicam mobic 15mg prn         Methocarbamol robaxine 500mg off      Nonformulary mag glycinate 100mg 1   1     Omeprazole prilosec 20mg 1      Oxycodone roxicodone 5mg  off      Probiotic  1         Sulfacetamide sodium sulfur 10-5% lotion May start         Sulfacetamide sodium sulfur susp May start      Tizanidine zanaflex 4mg  stopped                                                  Plan:    Back surgery and recovery  Had withdrawal off narcotics    Stressful year    Still having dreams and they are better with melatonin 2 x 5mg    Pain is managed  Fluoxetine 20mg - consider  "wean  Duloxetine 60mg - if needed consider dose increase    October 2023 will go to [place]  April 2024 will go to the [employer]  May 2023 was in [place], [facility] and [place]    Stomach medication  Discussion about omeprazole (prilosec)  Discussion on pantoprazole, tums, etc.     Pharmacy ([facility]) consultation and medication management  Please call the scheduling number I@ [phone] to set up an appointment with pharmacists Pat Perez or Aimee Brown.     Bowel frequency is every 2-3 days  Only taking a probiotic  Also taking magnesium product for cramping  Diet is relatively healthy - not taking prunes, etc.     Has incontinence and seeing urologist and trying things and \"nothing has worked\"  Has tried mirabegron myrbetriq and did not work - unclear as to dose and duration 6 weeks  Has not tried botox  Has not done PTNS  Urologist is [facility].   She has leakage with sudden movements  Has tried pelvic floor physical therapy which was interrupted and has not been back.   Denies urgency  Nocturia - seldom  Has some frequency 5/6 times per day  Has not been to see them  Urology for gyn urology for stress urinary incontinence    Has has a slight worsening in her balance.   Walking 45 minutes daily -  once or twice daily  Doing upper body weights  Did big and loud in the past   This was re ordered    Her legs feel heavy at times  She has self adjusted her medications.   Still using 1 in the morning, 2 at 3pm and 1 in the evening     Her son in law in  [place] was recently diagnosed with Parkinson  Had additional discussion about options like antibody therapies, treadmill study  Encouraged genetic testing  Pdgeneration.     ECG today if possible as she is taking medications that can affect her QTc interval  Had an ECG 2/2023 prior to these medications.     ECG on review by me today was normal        Coding statement:   Medical Decision Making:  #  Chronic progressive medical conditions addressed  - see " "above --   Review and/or interpretation of unique test or documentation from a provider outside of neurology no   Independent historian provided additional details  no I  Prescription drug management and review of potential side effects and/or monitoring for side effects  -- see above ---  Health impacted by social determinants of health  no    I have reviewed the note as documented above.  This accurately captures the substance of my conversation with the patient and total time spent preparing for visit, executing visit and completing visit on the day of the visit:  40 minutes.  The portion of this total time included face to face time 30 minutes    Damien Lujan MD     ______________________________________    Last visit date and details:             ______________________________________      Patient was asked about 14 Review of systems including changes in vision (dry eyes, double vision), hearing, heart, lungs, musculoskeletal, depression, anxiety, snoring, RBD, insomnia, urinary frequency, urinary urgency, constipation, swallowing problems, hematological, ID, allergies, skin problems: seborrhea, endocrinological: thyroid, diabetes, cholesterol; balance, weight changes, and other neurological problems and these were not significant at this time except for   Patient Active Problem List   Diagnosis    Family hx of ALS (amyotrophic lateral sclerosis)    Family history of parkinsonism    Family history of dementia    Family history of ulcerative colitis    BPPV (benign paroxysmal positional vertigo)    Dysplastic nevi    Intervertebral cervical disc disorder with myelopathy, cervical region    Parkinson's disease (H)    Abnormal reflex    Disease of spinal cord, unspecified (H)    Urinary incontinence          Allergies   Allergen Reactions    Other Environmental Allergy Other (See Comments)     \"Sneezing\"  Other reaction(s): Respiratory Distress  Animal Dander (sneezing)    Sweet Corn (Diagnostic)      Other " "reaction(s): Respiratory Distress  \"Sneezing to Sweet Corn\"    Ciprofloxacin Rash    Hydrocodone-Acetaminophen Other (See Comments)     nightmares    Morphine Other (See Comments)     Nightmares    Animal Dander Difficulty breathing     \"sneezing\"    No Clinical Screening - See Comments Difficulty breathing     \"sneezing to melon & sweet corn\"    Prochlorperazine Other (See Comments)     \" tongue goes in & out & my stomach contracts\"  \"Tongue goes in & out & my stomach contracts\"    Cephalexin Rash     Past Surgical History:   Procedure Laterality Date    APPENDECTOMY      FOOT SURGERY Right 01/2021    bunion surgery    FOOT SURGERY Left     will be done 10/2021 bunion    NECK SURGERY      cervical laminectomy C56     Past Medical History:   Diagnosis Date    Family history of dementia 04/09/2020    Family history of tremor     great grandfather    Family history of ulcerative colitis 04/09/2020    Family hx of ALS (amyotrophic lateral sclerosis) 04/09/2020     Social History     Socioeconomic History    Marital status:      Spouse name: Not on file    Number of children: Not on file    Years of education: Not on file    Highest education level: Not on file   Occupational History    Not on file   Tobacco Use    Smoking status: Never    Smokeless tobacco: Never   Substance and Sexual Activity    Alcohol use: Yes    Drug use: Not on file    Sexual activity: Not on file   Other Topics Concern    Not on file   Social History Narrative    Not on file     Social Determinants of Health     Financial Resource Strain: Not on file   Food Insecurity: Not on file   Transportation Needs: Not on file   Physical Activity: Not on file   Stress: Not on file   Social Connections: Not on file   Intimate Partner Violence: Not on file   Housing Stability: Not on file       Drug and lactation database from the [place] National Library of Medicine:  http://toxnet.nlm.nih.gov/cgi-bin/sis/htmlgen?LACT      B/P: Data " Unavailable, T: Data Unavailable, P: Data Unavailable, R: Data Unavailable 0 lbs 0 oz  There were no vitals taken for this visit., There is no height or weight on file to calculate BMI.  Medications and Vitals not listed above were documented in the cart and reviewed by me.     Current Outpatient Medications   Medication Sig Dispense Refill    acetaminophen (TYLENOL) 500 MG tablet Take 2 tablets (1,000 mg) by mouth daily as needed for mild pain      ALPRAZolam (XANAX) 0.5 MG tablet       calcium carbonate 600 mg-vitamin D 400 units (CALTRATE) 600-400 MG-UNIT per tablet 1 tab by mouth daily @ noon 90 tablet 3    carbidopa-levodopa (SINEMET)  MG tablet Take 1 tablet by mouth three times a day (at 8 am, 3:30 pm and 10 pm) and 1 tab as needed. 360 tablet 11    Cholecalciferol 100 MCG (4000 UT) CAPS 4000 units by mouth daily @@ 1/130pm      DULoxetine (CYMBALTA) 60 MG capsule 60mg capsule by mouth daily @10pm 90 capsule 3    FLUoxetine (PROZAC) 20 MG capsule       gabapentin (NEURONTIN) 300 MG capsule 2 x 300mg tab by mouth 3/day @ 8am, 330pm and 10pm = 6/day 540 capsule 3    Glucosamine-Chondroit-Vit C-Mn (GLUCOSAMINE CHONDROITIN 1500 COMPLEX) CAPS Glucosamine 1500 chondroitin 1200mg by mouth twice daily @ noon and 10pm      hydrOXYzine (ATARAX) 25 MG tablet       ibuprofen (ADVIL/MOTRIN) 200 MG tablet Take 2-3 tablets (400-600 mg) by mouth every 4 hours as needed for mild pain      MAGNESIUM GLYCINATE PO Take 100 mg by mouth 2 times daily      meclizine (ANTIVERT) 25 MG tablet 1/2 to 1 tab of 25mg by mouth daily as needed      Melatonin 5 MG CHEW 5mg chew by mouth nightly at 10pm      meloxicam (MOBIC) 15 MG tablet Take 1 tablet (15 mg) by mouth daily as needed      methocarbamol (ROBAXIN) 500 MG tablet       ondansetron (ZOFRAN ODT) 4 MG ODT tab       oxyCODONE (ROXICODONE) 5 MG tablet       Probiotic Product (PROBIOTIC DAILY PO) Take 1 capsule by mouth daily ([employer] brand)      sulfacetamide  sodium-sulfur 10-5 % LOTN Apply topically daily as needed       Sulfacetamide Sodium-Sulfur 10-5 % SUSP Wash face twice daily      tiZANidine (ZANAFLEX) 4 MG tablet            Damien Lujan MD

## 2023-07-31 ENCOUNTER — OFFICE VISIT (OUTPATIENT)
Dept: NEUROLOGY | Facility: CLINIC | Age: 68
End: 2023-07-31
Payer: COMMERCIAL

## 2023-07-31 VITALS
WEIGHT: 116 LBS | BODY MASS INDEX: 21.35 KG/M2 | HEART RATE: 89 BPM | HEIGHT: 62 IN | SYSTOLIC BLOOD PRESSURE: 103 MMHG | DIASTOLIC BLOOD PRESSURE: 67 MMHG

## 2023-07-31 DIAGNOSIS — N39.3 SUI (STRESS URINARY INCONTINENCE, FEMALE): ICD-10-CM

## 2023-07-31 DIAGNOSIS — F80.0 ARTICULATION DISORDER: ICD-10-CM

## 2023-07-31 DIAGNOSIS — Z51.81 ENCOUNTER FOR THERAPEUTIC DRUG MONITORING: ICD-10-CM

## 2023-07-31 DIAGNOSIS — G20.C PARKINSONISM, UNSPECIFIED PARKINSONISM TYPE (H): ICD-10-CM

## 2023-07-31 DIAGNOSIS — G20.A1 PARKINSON'S DISEASE (H): Primary | ICD-10-CM

## 2023-07-31 PROCEDURE — 99215 OFFICE O/P EST HI 40 MIN: CPT | Mod: 25 | Performed by: PSYCHIATRY & NEUROLOGY

## 2023-07-31 PROCEDURE — 93000 ELECTROCARDIOGRAM COMPLETE: CPT | Performed by: PSYCHIATRY & NEUROLOGY

## 2023-07-31 RX ORDER — CARBIDOPA AND LEVODOPA 25; 100 MG/1; MG/1
TABLET ORAL
Qty: 360 TABLET | Refills: 11 | Status: SHIPPED | OUTPATIENT
Start: 2023-07-31 | End: 2024-02-05

## 2023-07-31 RX ORDER — GLUCOSAMINE/CHONDR SU A SOD 750-600 MG
TABLET ORAL
COMMUNITY
End: 2023-08-09

## 2023-07-31 NOTE — NURSING NOTE
"Ne Padilla's goals for this visit include:   Chief Complaint   Patient presents with    RECHECK       Parkinson// Return visit rescheduled by patient request by provider, records in  Epic           She requests these members of her care team be copied on today's visit information: yes    PCP: Sary Fuller    Referring Provider:  No referring provider defined for this encounter.    /67   Pulse 89   Ht 1.575 m (5' 2\")   Wt 52.6 kg (116 lb)   BMI 21.22 kg/m      Do you need any medication refills at today's visit? No  WALDO Capone, CMA (AAMA)      "

## 2023-07-31 NOTE — LETTER
2023         RE: Ne Padilla  [address]   [facility] [address]        Dear Colleague,    Thank you for referring your patient, Ne Padilla, to the [facility] [place]. Please see a copy of my visit note below.        Diagnosis/Summary/Recommendations:    PATIENT: Ne Padilla  67 year old female     : 1955    PARESH: 2023    MRN: [id number]  [address] [facility] [address]     [phone] (M)   [phone] (H)       lives in [place]  [phone] (M)  [phone] (H)  email listed - quinn set up  No active proxy  Mark spouse  [phone] home  [email or web address]  [phone]         [phone] - use this     7 grand kids - busy   [place]     Visit with Nadir Kan to review genetic testing related to Ulcerative colitis as he father had this and ALS  Her great grandfather had tremor and there are numerous family members with tremor, autoimmune conditions      She was not anemic 2021 - no  Iron stores done        Assessment:  (G20) Parkinson's disease (H)  (primary encounter diagnosis)  Carbidopa/levodopa 25/100 sinemet tab 3/day and 1 prn     (Z82.0) Family hx of ALS (amyotrophic lateral sclerosis)  - father Singh Biswas  1933  (Z82.0) Family history of tremor great grand father  (Z81.8) Family history of dementia father   (Z83.79) Family history of ulcerative colitis father  Mother has had tremor   Brother has a gi problem  Sister with rheumatoid arthritis  Retired.     PD GENERATION study  Did not have any of the 7 variants in her genetic study with Dr. Kuhn     Review of diagnosis    Parkinson     Avoidance of dopamine blockers   Not taking     Motor complication review   1 prn sinemet on days active     Review of Impulse control disorders   No      Review of surgical or medication options   yes     Gait/Balance/Falls   Had one fall last night when her foot feel asleep and got up and her foot  collapsed and had partial fall     Exercise/Therapy performed/offered   Walking  Doing exercises  Consider doing yoga  [email or web address]     Maddy Nolanison Alpers speech      Okeefe Sweta is her pcp through [facility]     Cognitive/Driving - denies     Mood  -   Stress  Brother in eating program - Deb - very low BMI- in and out Gnosticism  Other stressor with family member with separation     Hallucinations/delusions  - not     Sleep  - has wild dreams;   Melatonin 5mg nightly which has helped reduced dream behaviors.      Sleep is better     Talked about sleep needs and has been having problems sleeping at night - insomnia; may have some RLS type of features - electric feeling - this can happen when trying t fall asleep >> than at 330am after falling asleep.     Bladder  - seen urology   stress urinary incontinence  Considering a procedure for this. Medication did not work.   Considering botox for her bladder, PTNS and interstim, Kegel, etc.      Will see urology after foot surgery  Rober     GI/Constipation/GERD   Nonformulary magnesium glycinate 100mg twice daily  Ondansetron zofran 4mg ODT - not using     ENDO -   Calcium Carbonate 600 vitamin D 400  Cholecalciferol vitamin D3 4000 units - see aboove  Fish oil-3 fatty acids 100mg - stopped  Glucosamine-chondroitin twice daily      Nodule found on thyroid and was ultrasound and had followup     Has sweating issues. - discussed that it is unlikely related to her thyroid which was checked back       Cardio/heart -      2023   Pat Name:     ANTONIA NEGRETE              Department:   [facility]   Patient ID:   [id number]                    Room:           Gender:       F                        Technician:     :          1955               Requested By: JONNATHAN STEEL MD   Order Number: [id number]                Reading MD:   GUANAKO Chery                                    Measurements   Intervals                              Axis             Rate:         80                       P:            64   RI:           173                      QRS:          -5   QRSD:         73                       T:            63   QT:           310                                      QTc:          358                                                                 Interpretive Statements   SINUS RHYTHM WITH SINUS ARRHYTHMIA   POSSIBLE LEFT ATRIAL ENLARGEMENT [-0.1mV P WAVE IN V1/V2]   NONSPECIFIC T-WAVE ABNORMALITY   Compared to ECG 01/04/2021 16:15:00   T-wave abnormality now present   Electronically Signed On 2-1-2023 10:39:18 CST by GUANAKO Chery      Vision - glasses     Heme  -      Other:     Right bunion surgery 2021   Left bunion surgery Friday      Ibuprofen 200mg as needed     Meclizine ever other night - on wean   Rare meloxicam mobic      Tizanidine zanaflex 4mg 1/2 every other night. - not using     For musculoskeletal pain  Duloxetine cymbalta 60mg  Gabapentin neurontin 2 x 300mg 3/day     Bunion on the other foot being done this Friday     Dr. Yasmeen Moctezuma foot surgeon     Medications     8am noon 3pm 10pm   Acetaminophen tylenol 500mg  prn         Alprazolam xanax 0.5mg off      Calcium Carbonate 600 vitamin D 400   1       Carbidopa/levodopa 25/100 sinemet 1   2 1  and prn   Cholecalciferol vitamin D3 4000 units   1       Duloxetine cymbalta 60mg  1        Fluoxetine prozac 20mg    1   Gabapentin neurontin 300mg 2   2 2   Glucosamine-chondroitin   2   1   Hydroxyzine atarax 25mg no      Ibuprofen advil 200mg prn         Magnesium glycinate 100mg below         Meclizine antivert 25mg 1/2 prn         Melatonin  5mg       2   Meloxicam mobic 15mg prn         Methocarbamol robaxine 500mg off      Nonformulary mag glycinate 100mg 1   1     Omeprazole prilosec 20mg 1      Oxycodone roxicodone 5mg  off      Probiotic  1         Sulfacetamide sodium sulfur 10-5% lotion May start         Sulfacetamide sodium sulfur susp May start      Tizanidine  "zanaflex 4mg  stopped                                                  Plan:    Back surgery and recovery  Had withdrawal off narcotics    Stressful year    Still having dreams and they are better with melatonin 2 x 5mg    Pain is managed  Fluoxetine 20mg - consider wean  Duloxetine 60mg - if needed consider dose increase    October 2023 will go to [place]  April 2024 will go to the [place]  May 2023 was in [place], [facility] and [place]    Stomach medication  Discussion about omeprazole (prilosec)  Discussion on pantoprazole, tums, etc.     Pharmacy ([facility]) consultation and medication management  Please call the scheduling number I@ [phone] to set up an appointment with pharmacists Pat Perez or Aimee Brown.     Bowel frequency is every 2-3 days  Only taking a probiotic  Also taking magnesium product for cramping  Diet is relatively healthy - not taking prunes, etc.     Has incontinence and seeing urologist and trying things and \"nothing has worked\"  Has tried mirabegron myrbetriq and did not work - unclear as to dose and duration 6 weeks  Has not tried botox  Has not done PTNS  Urologist is [place] [facility].   She has leakage with sudden movements  Has tried pelvic floor physical therapy which was interrupted and has not been back.   Denies urgency  Nocturia - seldom  Has some frequency 5/6 times per day  Has not been to see them  Urology for gyn urology for stress urinary incontinence    Has has a slight worsening in her balance.   Walking 45 minutes daily -  once or twice daily  Doing upper body weights  Did big and loud in the past   This was re ordered    Her legs feel heavy at times  She has self adjusted her medications.   Still using 1 in the morning, 2 at 3pm and 1 in the evening     Her son in law in  [place] was recently diagnosed with Parkinson  Had additional discussion about options like antibody therapies, treadmill study  Encouraged genetic testing  Pdgeneration.     ECG today if " possible as she is taking medications that can affect her QTc interval  Had an ECG 2/2023 prior to these medications.     ECG on review by me today was normal        Coding statement:   Medical Decision Making:  #  Chronic progressive medical conditions addressed  - see above --   Review and/or interpretation of unique test or documentation from a provider outside of neurology no   Independent historian provided additional details  no I  Prescription drug management and review of potential side effects and/or monitoring for side effects  -- see above ---  Health impacted by social determinants of health  no    I have reviewed the note as documented above.  This accurately captures the substance of my conversation with the patient and total time spent preparing for visit, executing visit and completing visit on the day of the visit:  40 minutes.  The portion of this total time included face to face time 30 minutes    Damien Lujan MD     ______________________________________    Last visit date and details:             ______________________________________      Patient was asked about 14 Review of systems including changes in vision (dry eyes, double vision), hearing, heart, lungs, musculoskeletal, depression, anxiety, snoring, RBD, insomnia, urinary frequency, urinary urgency, constipation, swallowing problems, hematological, ID, allergies, skin problems: seborrhea, endocrinological: thyroid, diabetes, cholesterol; balance, weight changes, and other neurological problems and these were not significant at this time except for   Patient Active Problem List   Diagnosis     Family hx of ALS (amyotrophic lateral sclerosis)     Family history of parkinsonism     Family history of dementia     Family history of ulcerative colitis     BPPV (benign paroxysmal positional vertigo)     Dysplastic nevi     Intervertebral cervical disc disorder with myelopathy, cervical region     Parkinson's disease (H)     Abnormal reflex      "Disease of spinal cord, unspecified (H)     Urinary incontinence          Allergies   Allergen Reactions     Other Environmental Allergy Other (See Comments)     \"Sneezing\"  Other reaction(s): Respiratory Distress  Animal Dander (sneezing)     Sweet Corn (Diagnostic)      Other reaction(s): Respiratory Distress  \"Sneezing to Sweet Corn\"     Ciprofloxacin Rash     Hydrocodone-Acetaminophen Other (See Comments)     nightmares     Morphine Other (See Comments)     Nightmares     Animal Dander Difficulty breathing     \"sneezing\"     No Clinical Screening - See Comments Difficulty breathing     \"sneezing to melon & sweet corn\"     Prochlorperazine Other (See Comments)     \" tongue goes in & out & my stomach contracts\"  \"Tongue goes in & out & my stomach contracts\"     Cephalexin Rash     Past Surgical History:   Procedure Laterality Date     APPENDECTOMY       FOOT SURGERY Right 01/2021    bunion surgery     FOOT SURGERY Left     will be done 10/2021 bunion     NECK SURGERY      cervical laminectomy C56     Past Medical History:   Diagnosis Date     Family history of dementia 04/09/2020     Family history of tremor     great grandfather     Family history of ulcerative colitis 04/09/2020     Family hx of ALS (amyotrophic lateral sclerosis) 04/09/2020     Social History     Socioeconomic History     Marital status:      Spouse name: Not on file     Number of children: Not on file     Years of education: Not on file     Highest education level: Not on file   Occupational History     Not on file   Tobacco Use     Smoking status: Never     Smokeless tobacco: Never   Substance and Sexual Activity     Alcohol use: Yes     Drug use: Not on file     Sexual activity: Not on file   Other Topics Concern     Not on file   Social History Narrative     Not on file     Social Determinants of Health     Financial Resource Strain: Not on file   Food Insecurity: Not on file   Transportation Needs: Not on file   Physical Activity: " Not on file   Stress: Not on file   Social Connections: Not on file   Intimate Partner Violence: Not on file   Housing Stability: Not on file       Drug and lactation database from the [place] National Library of Medicine:  http://toxnet.nlm.nih.gov/cgi-bin/sis/htmlgen?LACT      B/P: Data Unavailable, T: Data Unavailable, P: Data Unavailable, R: Data Unavailable 0 lbs 0 oz  There were no vitals taken for this visit., There is no height or weight on file to calculate BMI.  Medications and Vitals not listed above were documented in the cart and reviewed by me.     Current Outpatient Medications   Medication Sig Dispense Refill     acetaminophen (TYLENOL) 500 MG tablet Take 2 tablets (1,000 mg) by mouth daily as needed for mild pain       ALPRAZolam (XANAX) 0.5 MG tablet        calcium carbonate 600 mg-vitamin D 400 units (CALTRATE) 600-400 MG-UNIT per tablet 1 tab by mouth daily @ noon 90 tablet 3     carbidopa-levodopa (SINEMET)  MG tablet Take 1 tablet by mouth three times a day (at 8 am, 3:30 pm and 10 pm) and 1 tab as needed. 360 tablet 11     Cholecalciferol 100 MCG (4000 UT) CAPS 4000 units by mouth daily @@ 1/130pm       DULoxetine (CYMBALTA) 60 MG capsule 60mg capsule by mouth daily @10pm 90 capsule 3     FLUoxetine (PROZAC) 20 MG capsule        gabapentin (NEURONTIN) 300 MG capsule 2 x 300mg tab by mouth 3/day @ 8am, 330pm and 10pm = 6/day 540 capsule 3     Glucosamine-Chondroit-Vit C-Mn (GLUCOSAMINE CHONDROITIN 1500 COMPLEX) CAPS Glucosamine 1500 chondroitin 1200mg by mouth twice daily @ noon and 10pm       hydrOXYzine (ATARAX) 25 MG tablet        ibuprofen (ADVIL/MOTRIN) 200 MG tablet Take 2-3 tablets (400-600 mg) by mouth every 4 hours as needed for mild pain       MAGNESIUM GLYCINATE PO Take 100 mg by mouth 2 times daily       meclizine (ANTIVERT) 25 MG tablet 1/2 to 1 tab of 25mg by mouth daily as needed       Melatonin 5 MG CHEW 5mg chew by mouth nightly at 10pm       meloxicam (MOBIC) 15 MG  tablet Take 1 tablet (15 mg) by mouth daily as needed       methocarbamol (ROBAXIN) 500 MG tablet        ondansetron (ZOFRAN ODT) 4 MG ODT tab        oxyCODONE (ROXICODONE) 5 MG tablet        Probiotic Product (PROBIOTIC DAILY PO) Take 1 capsule by mouth daily ([employer] brand)       sulfacetamide sodium-sulfur 10-5 % LOTN Apply topically daily as needed        Sulfacetamide Sodium-Sulfur 10-5 % SUSP Wash face twice daily       tiZANidine (ZANAFLEX) 4 MG tablet            Damien Lujan MD      Again, thank you for allowing me to participate in the care of your patient.        Sincerely,        Damien Lujan MD

## 2023-07-31 NOTE — PATIENT INSTRUCTIONS
"  Medications     8am noon 3pm 10pm   Acetaminophen tylenol 500mg  prn         Alprazolam xanax 0.5mg off      Calcium Carbonate 600 vitamin D 400   1       Carbidopa/levodopa 25/100 sinemet 1   2 1  and prn   Cholecalciferol vitamin D3 4000 units   1       Duloxetine cymbalta 60mg  1        Fluoxetine prozac 20mg    1   Gabapentin neurontin 300mg 2   2 2   Glucosamine-chondroitin   2   1   Hydroxyzine atarax 25mg no      Ibuprofen advil 200mg prn         Magnesium glycinate 100mg below         Meclizine antivert 25mg 1/2 prn         Melatonin  5mg       2   Meloxicam mobic 15mg prn         Methocarbamol robaxine 500mg off      Nonformulary mag glycinate 100mg 1   1     Omeprazole prilosec 20mg 1      Oxycodone roxicodone 5mg  off      Probiotic  1         Sulfacetamide sodium sulfur 10-5% lotion May start         Sulfacetamide sodium sulfur susp May start      Tizanidine zanaflex 4mg  stopped                                                  Plan:    Back surgery and recovery  Had withdrawal off narcotics    Stressful year    Still having dreams and they are better with melatonin 2 x 5mg    Pain is managed  Fluoxetine 20mg - consider wean  Duloxetine 60mg - if needed consider dose increase    October 2023 will go to [facility]  April 2024 will go to the [facility]  May 2023 was in [place], [facility] and [facility]    Stomach medication  Discussion about omeprazole (prilosec)  Discussion on pantoprazole, tums, etc.     Pharmacy ([facility]) consultation and medication management  Please call the scheduling number I@ [phone] to set up an appointment with pharmacists Pat Perez or Aimee Brown.     Bowel frequency is every 2-3 days  Only taking a probiotic  Also taking magnesium product for cramping  Diet is relatively healthy - not taking prunes, etc.     Has incontinence and seeing urologist and trying things and \"nothing has worked\"  Has tried mirabegron myrbetriq and did not work - unclear as to dose and duration 6 " weeks  Has not tried botox  Has not done PTNS  [place]ist is [facility].   She has leakage with sudden movements  Has tried pelvic floor physical therapy which was interrupted and has not been back.   Denies urgency  Nocturia - seldom  Has some frequency 5/6 times per day  Has not been to see them  Urology for gyn urology for stress urinary incontinence    Has has a slight worsening in her balance.   Walking 45 minutes daily -  once or twice daily  Doing upper body weights  Did big and loud in the past   This was re ordered    Her legs feel heavy at times  She has self adjusted her medications.   Still using 1 in the morning, 2 at 3pm and 1 in the evening     ECG today if possible as she is taking medications that can affect her QTc interval  Had an ECG 2/2023 prior to these medications.

## 2023-08-02 ENCOUNTER — TELEPHONE (OUTPATIENT)
Dept: NEUROLOGY | Facility: CLINIC | Age: 68
End: 2023-08-02
Payer: COMMERCIAL

## 2023-08-02 NOTE — TELEPHONE ENCOUNTER
MTM referral from: [facility] visit (referral by provider)    MTM referral outreach attempt #2 on August 2, 2023 at 2:17 PM      Outcome: Patient not reachable after several attempts, will route to MT Pharmacist/Provider as an FYI.  [facility] scheduling number is [phone].  Thank you for the referral.    Use [employer] Part D/MAP for the carrier/Plan on the flowsheet    Cortney Green CPhT  [facility]        
Statement Selected

## 2023-08-09 ENCOUNTER — VIRTUAL VISIT (OUTPATIENT)
Dept: PHARMACY | Facility: CLINIC | Age: 68
End: 2023-08-09
Payer: COMMERCIAL

## 2023-08-09 DIAGNOSIS — K59.00 CONSTIPATION, UNSPECIFIED CONSTIPATION TYPE: ICD-10-CM

## 2023-08-09 DIAGNOSIS — G20.A1 PARKINSON'S DISEASE (H): Primary | ICD-10-CM

## 2023-08-09 DIAGNOSIS — Z78.9 TAKES DIETARY SUPPLEMENTS: ICD-10-CM

## 2023-08-09 DIAGNOSIS — F32.A DEPRESSION, UNSPECIFIED DEPRESSION TYPE: ICD-10-CM

## 2023-08-09 DIAGNOSIS — H81.10 BENIGN PAROXYSMAL POSITIONAL VERTIGO, UNSPECIFIED LATERALITY: ICD-10-CM

## 2023-08-09 DIAGNOSIS — L21.9 SEBORRHEIC DERMATITIS: ICD-10-CM

## 2023-08-09 DIAGNOSIS — F41.9 ANXIETY: ICD-10-CM

## 2023-08-09 DIAGNOSIS — R52 PAIN: ICD-10-CM

## 2023-08-09 DIAGNOSIS — K21.9 GASTROESOPHAGEAL REFLUX DISEASE, UNSPECIFIED WHETHER ESOPHAGITIS PRESENT: ICD-10-CM

## 2023-08-09 PROCEDURE — 99607 MTMS BY PHARM ADDL 15 MIN: CPT | Mod: VID | Performed by: PHARMACIST

## 2023-08-09 PROCEDURE — 99605 MTMS BY PHARM NP 15 MIN: CPT | Mod: VID | Performed by: PHARMACIST

## 2023-08-09 RX ORDER — CHOLECALCIFEROL (VITAMIN D3) 50 MCG
1 TABLET ORAL DAILY
COMMUNITY

## 2023-08-09 NOTE — PROGRESS NOTES
Medication Therapy Management (MTM) Encounter    ASSESSMENT:                            Medication Adherence/Access: No issues identified    Parkinson's Disease:    Stable     Pain:   Improved; patient is interested in weaning down the gabapentin so we will try to slowly reduce the dose and see if she tolerates being on less gabapentin     Depression/Anxiety:  Patient may benefit from stopping the fluoxetine now that she has recovered from back surgery and mood has been good. Duplicate selective serotonin reuptake inhibitor/SNRI medications can also cause involuntary movements/jerking/twitching.     GERD:   Patient may benefit from continuing the omeprazole for 3 month course to help with stomach healing (due to taking NSAIDs and opioids earlier this year) and then may try weaning off     Constipation:   Discussed she could add prune juice and/or Colace to help with constipation     Vertigo:  Stable     Seborrheic dermatitis:   Stable     Vitamins/Supplements:   Stable     PLAN:                            Stop fluoxetine. This medication will naturally taper out of your body. Please monitor for any change in mood or anxiety.   After you're off fluoxetine for at least 2 weeks, then you may try weaning down the gabapentin. I would recommend reducing gabapentin by 1 capsule (300 mg) weekly for the following 3 weeks before we follow up again. Monitor for any change in pain or restlessness.   For constipation, you may try prunes/prune juice and/or Colace daily.   You should probably continue the omeprazole for about 3 months then we may be able to try weaning off of it.     Follow-up: 9/13/23 at 11 am video visit    SUBJECTIVE/OBJECTIVE:                          Ne Padilla is a 67 year old female contacted via secure video for a follow-up visit from 10/13/21. Patient was accompanied by her .      Reason for visit: yearly medication review.    Allergies/ADRs: Reviewed in chart  Past Medical History: Reviewed in  "chart  Tobacco: She reports that she has never smoked. She has never used smokeless tobacco.  Alcohol: 1-3 beverages / week- typically 2 glasses of wine/week at the most or occasional Gin and Tonic  Social: her  is a pharmacist     Medication Adherence/Access: no issues reported    Parkinson's Disease:    carbidopa-levodopa  mg, 1 tablet in the morning, 2 tablets in the afternoon, and 1 tablet at night    Melatonin 10 mg nightly  Patient states that she recently increased the mid-day dose of carbidopa-levodopa because she has been more active mid-day and this helps her be more relaxed. She has about the same amount of tremor lately. She also feels a little more unsteady but this is not significant and she still feels steady when she is walking. Patient states she has more movement in her legs (twitching) but thinks this could be due to increased exercise lately. She notices the leg movement more at night and a little during the day. She states the melatonin has helped with intense dreams; now dreams are not as intense and she is not \"beating up\" her  during sleep.     Pain:   Gabapentin 600 mg 3 times/day (same times as carbidopa-levodopa)  Meloxicam 15 mg as needed- not currently because pain has been much less   Ibuprofen as needed- seldomly uses due to stomach issues  Tizanidine as needed- hasn't been using; caused dizziness/falls  Oxycodone- stopped   Tylenol- as needed   Patient states that at one point she is taking more gabapentin for pain but has been able to reduce the dose. The gabapentin was for back pain originally but has also helped with neck pain. Has been able to stop multiple pain medications now that her pain is improved post back surgery (Feb 2023).     Depression/Anxiety:  Duloxetine 60 mg in the morning  Fluoxetine 20 mg in the evening  Patient states mood has been stable; \"feelings are not as intense any more.\" She states that the fluoxetine was added post surgery to help " with depression. She was told to be on the fluoxetine for about 6 months and then could try weaning off.     GERD:   Omeprazole 20 mg once daily (6 weeks)  Patient reports she was having some stomach issues due to the pain medications. She had some issues with nausea with weaning off oxycodone but this has improved. Now is only occassionally having nausea and not having abdominal pain. There was concern she may have had an ulcer so was told to do a trial of omeprazole. She has been on omeprazole for about 6 weeks now and wondering if she could wean off.    Constipation:   Probiotic daily  Reports she eats a lot of fruits/vegetables. States she is not as regular as she used to be but is better now that she is off oxycodone.     Vertigo:  Meclizine- none for a long time    Seborrheic dermatitis:   Sulfacetamide sodium - Lotion & face wash (not using currently)  Reports she is not willing to pay for these but would like them to stay on her medication list    Vitamins/Supplements:   Magnesium 100 mg twice daily   Calcium with D3 600 mg once daily  Vitamin D3 2000 units- 2 tablets daily   Glucosamine daily    Today's Vitals: There were no vitals taken for this visit.  ----------------    I spent 35 minutes with this patient today. All changes were made via collaborative practice agreement with Dr. Lujan. A copy of the visit note was provided to the patient's provider(s).    A summary of these recommendations was sent via [employer].    Pat Perez, Pharm.D.  Medication Therapy Management Pharmacist  [facility]    Telemedicine Visit Details  Type of service:  Video Conference via [employer]  Start Time:  1:20 PM  End Time:  1:55 PM     Medication Therapy Recommendations  Constipation, unspecified constipation type    Rationale: Untreated condition - Needs additional medication therapy - Indication   Recommendation: Start Medication - docusate sodium 100 MG tablet   Status: Accepted - no CPA Needed          Depression, unspecified depression type    Current Medication: FLUoxetine (PROZAC) 20 MG capsule (Discontinued)   Rationale: Duplicate Therapy - Unnecessary medication therapy - Indication   Recommendation: Discontinue Medication   Status: Accepted per CPA         Pain    Current Medication: gabapentin (NEURONTIN) 300 MG capsule   Rationale: Dose too high - Dosage too high - Safety   Recommendation: Decrease Dose   Status: Accepted per CPA

## 2023-08-09 NOTE — PATIENT INSTRUCTIONS
"Recommendations from today's MTM visit:                                                    Today we reviewed what your medicines are for, how to know if they are working, that your medicines are safe and how to make your medicine regimen as easy as possible.      Stop fluoxetine. This medication will naturally taper out of your body. Please monitor for any change in mood or anxiety.   After you're off fluoxetine for at least 2 weeks, then you may try weaning down the gabapentin. I would recommend reducing gabapentin by 1 capsule (300 mg) weekly for the following 3 weeks before we follow up again. Monitor for any change in pain or restlessness.   For constipation, you may try prunes/prune juice and/or Colace daily.   You should probably continue the omeprazole for about 3 months then we may be able to try weaning off of it.     Follow-up: 9/13/23 at 11 am video visit    It was great speaking with you today.  I value your experience and would be very thankful for your time in providing feedback in our clinic survey. In the next few days, you may receive an email or text message from [employer] with a link to a survey related to your  clinical pharmacist.\"     To schedule another MTM appointment, please call the clinic directly or you may call the MTM scheduling line at [phone] or toll-free at [phone].     My Clinical Pharmacist's contact information:                                                      Please feel free to contact me with any questions or concerns you have.      Pat Perez, Pharm.D.  Medication Therapy Management Pharmacist  [facility]     "

## 2023-08-09 NOTE — LETTER
"Recommended To-Do List      Prepared on: 08/14/2023       You can get the best results from your medications by completing the items on this \"To-Do List.\"      Bring your To-Do List when you go to your doctor. And, share it with your family or caregivers.    My To-Do List:  What we talked about: What I should do:   A new medication that may be of benefit to you    Start taking docusate sodium (COLACE)          What we talked about: What I should do:   A medication that you may no longer need    Stop taking FLUoxetine (PROzac)          What we talked about: What I should do:   Your medication dosage being too high    Decrease your dosage of gabapentin (NEURONTIN) as directed          What we talked about: What I should do:                     "

## 2023-08-09 NOTE — LETTER
_  Medication List        Prepared on: 08/14/2023     Bring your Medication List when you go to the doctor, hospital, or   emergency room. And, share it with your family or caregivers.     Note any changes to how you take your medications.  Cross out medications when you no longer use them.    Medication How I take it Why I use it Prescriber   acetaminophen (TYLENOL) 500 MG tablet Take 2 tablets (1,000 mg) by mouth daily as needed for mild pain Pain  Patient Reported    calcium carbonate 600 mg-vitamin D 400 units (CALTRATE) 600-400 MG-UNIT per tablet Take 1 tablet by mouth daily General Health  Patient Reported   carbidopa-levodopa (SINEMET)  MG tablet Take 1 tablet by mouth at 8 am, 2 tablets at 3 pm, and 1 tablet at 10 pm Parkinson's Disease  Damien Lujan MD   DULoxetine (CYMBALTA) 60 MG capsule Take 60 mg by mouth every morning Depression  Sary Fuller PA-C    gabapentin (NEURONTIN) 300 MG capsule Take 600 mg by mouth 3 times daily Pain  Sary Fuller PA-C    Glucosamine-Chondroit-Vit C-Mn (GLUCOSAMINE CHONDROITIN 1500 COMPLEX) CAPS Take 1 capsule by mouth daily General Health  Patient Reported   ibuprofen (ADVIL/MOTRIN) 200 MG tablet Take 2-3 tablets (400-600 mg) by mouth every 4 hours as needed for mild pain Pain  Damien Lujan MD   MAGNESIUM GLYCINATE PO Take 100 mg by mouth 2 times daily General Health  Patient Reported   meclizine (ANTIVERT) 25 MG tablet Take 1 tablet by mouth daily as needed  Vertigo  Sary Fuller PA-C    Melatonin 5 MG CHEW Take 10 mg by mouth nightly  Vivid dreams  Patient Reported   meloxicam (MOBIC) 15 MG tablet Take 1 tablet (15 mg) by mouth daily as needed Pain  Sary Fuller PA-C    omeprazole (PRILOSEC) 20 MG DR capsule Take 20 mg by mouth daily Stomach pain  Sary Fuller PA-C    Probiotic Product (PROBIOTIC DAILY PO) Take 1 capsule by mouth daily ([employer] brand) General Health  Patient Reported   sulfacetamide sodium-sulfur 10-5 % LOTN Apply  topically daily as needed Skin condition  Sary Fuller PA-C    Sulfacetamide Sodium-Sulfur 10-5 % SUSP Wash face twice daily with solution  Skin condition  Sary Fuller PA-C    vitamin D3 (CHOLECALCIFEROL) 50 mcg (2000 units) tablet Take 2 tablets by mouth daily General Health  Patient Reported         Add new medications, over-the-counter drugs, herbals, vitamins, or  minerals in the blank rows below.    Medication How I take it Why I use it Prescriber                                      Allergies:      other environmental allergy; sweet corn (diagnostic); ciprofloxacin; hydrocodone-acetaminophen; morphine; animal dander; no clinical screening - see comments; prochlorperazine; cephalexin        Side effects I have had:               Other Information:              My notes and questions:

## 2023-08-09 NOTE — LETTER
August 14, 2023  Ne ANGELA Randy  [address]   [place] [facility] [address]    Dear Ms. Padilla, [facility]     Thank you for talking with me on Aug 9, 2023 about your health and medications. As a follow-up to our conversation, I have included two documents:      Your Recommended To-Do List has steps you should take to get the best results from your medications.  Your Medication List will help you keep track of your medications and how to take them.    If you want to talk about these documents, please call Pat Perez RPH at phone: [phone], Monday-Friday 8-4:30pm.    I look forward to working with you and your doctors to make sure your medications work well for you.    Sincerely,  Pat Perez RPH  [facility] Pharmacist, [facility]

## 2023-09-13 ENCOUNTER — VIRTUAL VISIT (OUTPATIENT)
Dept: PHARMACY | Facility: CLINIC | Age: 68
End: 2023-09-13
Payer: COMMERCIAL

## 2023-09-13 DIAGNOSIS — R52 PAIN: ICD-10-CM

## 2023-09-13 DIAGNOSIS — F41.9 ANXIETY: Primary | ICD-10-CM

## 2023-09-13 DIAGNOSIS — K59.00 CONSTIPATION, UNSPECIFIED CONSTIPATION TYPE: ICD-10-CM

## 2023-09-13 DIAGNOSIS — F32.A DEPRESSION, UNSPECIFIED DEPRESSION TYPE: ICD-10-CM

## 2023-09-13 PROCEDURE — 99607 MTMS BY PHARM ADDL 15 MIN: CPT | Mod: VID | Performed by: PHARMACIST

## 2023-09-13 PROCEDURE — 99606 MTMS BY PHARM EST 15 MIN: CPT | Mod: VID | Performed by: PHARMACIST

## 2023-09-13 NOTE — PATIENT INSTRUCTIONS
"Recommendations from today's MTM visit:                                                      It's possible that the nighttime gabapentin was helping with leg cramps and sleep, so let's go back to 600 mg gabapentin at night and instead try decreasing the daytime doses by 300 mg (1 capsule) weekly until off. If you start having more pain or cramping during the day, please return to the previous dosage you were taking.   You can try prune juice 15 mL (3 tsps) daily and increase every 3 days as needed to desired effect     Follow-up: 10/24/23 at 2 pm video visit    It was great speaking with you today.  I value your experience and would be very thankful for your time in providing feedback in our clinic survey. In the next few days, you may receive an email or text message from [employer] with a link to a survey related to your  clinical pharmacist.\"     To schedule another MTM appointment, please call the clinic directly or you may call the MTM scheduling line at [phone] or toll-free at [phone].     My Clinical Pharmacist's contact information:                                                      Please feel free to contact me with any questions or concerns you have.      Pat Perez, Pharm.D.  Medication Therapy Management Pharmacist  [facility]     "

## 2023-09-13 NOTE — PROGRESS NOTES
Medication Therapy Management (MTM) Encounter    ASSESSMENT:                            Medication Adherence/Access: No issues identified    Depression/Anxiety:  Patient tolerated going off of fluoxetine     Pain:   Since she is having some evening leg cramps that may have improved with gabapentin, I advised that she go back to taking 600 mg of gabapentin at night and instead try weaning the daytime doses by 300 mg weekly until off     Constipation:   Advised to try prune juice     PLAN:                            It's possible that the nighttime gabapentin was helping with leg cramps and sleep, so let's go back to 600 mg gabapentin at night and instead try decreasing the daytime doses by 300 mg (1 capsule) weekly until off. If you start having more pain or cramping during the day, please return to the previous dosage you were taking.   You can try prune juice 15 mL (3 tsps) daily and increase every 3 days as needed to desired effect     Follow-up: 10/24/23 at 2 pm video visit    SUBJECTIVE/OBJECTIVE:                          Ne Padilla is a 67 year old female contacted via secure video for a follow-up visit from 8/9/23.       Reason for visit: follow up on medication changes.    Allergies/ADRs: Reviewed in chart  Past Medical History: Reviewed in chart  Tobacco: She reports that she has never smoked. She has never used smokeless tobacco.  Alcohol: 1-3 beverages / week- typically 2 glasses of wine/week at the most or occasional Gin and Tonic   Social: will be getting back from [facility] before our next appt    Medication Adherence/Access: no issues reported    Depression/Anxiety:  Duloxetine 60 mg in the morning  Since our last visit she was able to stop the fluoxetine. No changes in mood noted by patient or .     Pain:   Gabapentin 600 mg morning, 600 mg afternoon, 300 mg in the evening   Since reducing the evening dose of gabapentin she is noticing more leg cramps lately, mostly at night. Generally the cramps  last 5-10 seconds but feels longer than that. It's an intense cramping. Certain movements/stretching help with this. Occasionally yoga will bring on leg cramps.   She is already taking magnesium twice daily and prefers to not increase. Not sleeping as well lately. Busy time of year. Dealing with allergies. Pain is well controlled but she is being diligent with exercising still.     Constipation:   Probiotic daily  Tried Colace daily- this softened stools a little too much, making it difficult to clean after  BM.  Purchased prune juice but hasn't tried it yet.     Today's Vitals: There were no vitals taken for this visit.  ----------------    I spent 16 minutes with this patient today. All changes were made via collaborative practice agreement with Dr. Lujan. A copy of the visit note was provided to the patient's provider(s).    A summary of these recommendations was sent via [employer].    Pat Perez Pharm.D.  Medication Therapy Management Pharmacist  [facility]    Telemedicine Visit Details  Type of service:  Video Conference via [employer]  Start Time:  11:00 AM  End Time: 11:16 AM       Medication Therapy Recommendations  Constipation, unspecified constipation type    Current Medication: Probiotic Product (PROBIOTIC DAILY PO)   Rationale: Synergistic therapy - Needs additional medication therapy - Indication   Recommendation: Start Medication - prune juice Liqd   Status: Accepted - no CPA Needed         Pain    Current Medication: gabapentin (NEURONTIN) 300 MG capsule   Rationale: Does not understand instructions - Adherence - Adherence   Recommendation: Change Administration Time   Status: Patient Agreed - Adherence/Education

## 2023-10-24 ENCOUNTER — VIRTUAL VISIT (OUTPATIENT)
Dept: PHARMACY | Facility: CLINIC | Age: 68
End: 2023-10-24
Payer: COMMERCIAL

## 2023-10-24 DIAGNOSIS — K59.00 CONSTIPATION, UNSPECIFIED CONSTIPATION TYPE: ICD-10-CM

## 2023-10-24 DIAGNOSIS — K21.9 GASTROESOPHAGEAL REFLUX DISEASE, UNSPECIFIED WHETHER ESOPHAGITIS PRESENT: ICD-10-CM

## 2023-10-24 DIAGNOSIS — R52 PAIN: Primary | ICD-10-CM

## 2023-10-24 PROCEDURE — 99607 MTMS BY PHARM ADDL 15 MIN: CPT | Performed by: PHARMACIST

## 2023-10-24 PROCEDURE — 99606 MTMS BY PHARM EST 15 MIN: CPT | Performed by: PHARMACIST

## 2023-10-24 NOTE — PATIENT INSTRUCTIONS
"Recommendations from today's MTM visit:                                                      Let's try weaning down the gabapentin during the day as follows:  Week 1: 1 capsule in the AM, 2 capsules in the afternoon, 2 capsules before bed  Week 2: 1 capsule in the AM, 1 capsule in the afternoon, 2 capsules before bed   Week 3: 1 capsule in the afternoon, 2 capsules before bed  Week 4: 2 capsules before bed  Try prune juice 15 mL (3 tsps) daily and increase every 3 days as needed to desired effect    Try weaning off omeprazole by taking it once every other day for 1-2 weeks then stop taking it     Follow-up: 11/28/23 at 1 pm video visit    It was great speaking with you today.  I value your experience and would be very thankful for your time in providing feedback in our clinic survey. In the next few days, you may receive an email or text message from [employer] with a link to a survey related to your  clinical pharmacist.\"     To schedule another MTM appointment, please call the clinic directly or you may call the MTM scheduling line at [phone] or toll-free at [phone].     My Clinical Pharmacist's contact information:                                                      Please feel free to contact me with any questions or concerns you have.      Pat Perez, Pharm.D.  Medication Therapy Management Pharmacist  [facility]     "

## 2023-10-24 NOTE — PROGRESS NOTES
Medication Therapy Management (MTM) Encounter    ASSESSMENT:                            Medication Adherence/Access: No issues identified    Pain:   Advised patient on how to wean down gabapentin, starting with daytime doses     Constipation:   Patient may benefit from trying prune juice     GERD:   Patient may benefit from attempting to wean off omeprazole     PLAN:                            Let's try weaning down the gabapentin during the day as follows:  Week 1: 1 capsule in the AM, 2 capsules in the afternoon, 2 capsules before bed  Week 2: 1 capsule in the AM, 1 capsule in the afternoon, 2 capsules before bed   Week 3: 1 capsule in the afternoon, 2 capsules before bed  Week 4: 2 capsules before bed  Try prune juice 15 mL (3 tsps) daily and increase every 3 days as needed to desired effect    Try weaning off omeprazole by taking it once every other day for 1-2 weeks then stop taking it     Follow-up: 11/28/23 at 1 pm video visit    SUBJECTIVE/OBJECTIVE:                          Ne Padilla is a 68 year old female called for a follow-up visit from 9/13/23.       Reason for visit: follow up on medication changes.    Allergies/ADRs: Reviewed in chart  Past Medical History: Reviewed in chart  Tobacco: She reports that she has never smoked. She has never used smokeless tobacco.  Alcohol: 1-3 beverages / week- typically 2 glasses of wine/week at the most or occasional Gin and Tonic   Social: just returned from [facility] last week     Medication Adherence/Access: no issues reported     Pain:   Gabapentin 600 mg 3 times/day   She tried reducing gabapentin at night and had more leg cramps so she stopped the tapering until after returning home from [facility]. Now open to reduce the dosage again.     Constipation:   Probiotic daily  Will try prune juice this week - didn't start it while traveling to [facility]     GERD:   Omeprazole 20 mg once daily  She is not having any GI symptoms and now would like to try weaning off this  medication    Today's Vitals: There were no vitals taken for this visit.  ----------------    I spent 13 minutes with this patient today. All changes were made via collaborative practice agreement with Dr. Lujan. A copy of the visit note was provided to the patient's provider(s).    A summary of these recommendations was sent via "[employer].    Pat Perez, Pharm.D.  Medication Therapy Management Pharmacist  [facility]    Telemedicine Visit Details  Type of service:  Telephone visit  Start Time:  2:05 PM  End Time: 2:18 PM       Medication Therapy Recommendations  Gastroesophageal reflux disease, unspecified whether esophagitis present    Current Medication: omeprazole (PRILOSEC) 20 MG DR capsule   Rationale: No medical indication at this time - Unnecessary medication therapy - Indication   Recommendation: Discontinue Medication   Status: Accepted per CPA

## 2023-11-28 ENCOUNTER — VIRTUAL VISIT (OUTPATIENT)
Dept: PHARMACY | Facility: CLINIC | Age: 68
End: 2023-11-28
Payer: COMMERCIAL

## 2023-11-28 DIAGNOSIS — G20.A1 PARKINSON'S DISEASE, UNSPECIFIED WHETHER DYSKINESIA PRESENT, UNSPECIFIED WHETHER MANIFESTATIONS FLUCTUATE (H): Primary | ICD-10-CM

## 2023-11-28 DIAGNOSIS — K59.00 CONSTIPATION, UNSPECIFIED CONSTIPATION TYPE: ICD-10-CM

## 2023-11-28 DIAGNOSIS — R52 PAIN: ICD-10-CM

## 2023-11-28 DIAGNOSIS — K21.9 GASTROESOPHAGEAL REFLUX DISEASE, UNSPECIFIED WHETHER ESOPHAGITIS PRESENT: ICD-10-CM

## 2023-11-28 PROCEDURE — 99606 MTMS BY PHARM EST 15 MIN: CPT | Mod: VID | Performed by: PHARMACIST

## 2023-11-28 PROCEDURE — 99607 MTMS BY PHARM ADDL 15 MIN: CPT | Mod: VID | Performed by: PHARMACIST

## 2023-11-28 NOTE — PROGRESS NOTES
Medication Therapy Management (MTM) Encounter    ASSESSMENT:                            Medication Adherence/Access: No issues identified    Parkinson's Disease:    Stable     Pain:   Stable     GERD:   Resolved     Constipation:  Resolved     PLAN:                            Okay to continue the extra tablet of carbidopa-levodopa as-needed. If you are finding that you are taking the extra dose on a regular basis then we may decide to schedule it into your daily routine.  You may stay on the gabapentin 600 mg 3 times/day as it seems to be helpful for your pain.     Follow-up: yearly or as needed    SUBJECTIVE/OBJECTIVE:                          Ne Padilla is a 68 year old female contacted via secure video for a follow-up visit from 10/24/23.       Reason for visit: follow up on medication changes.    Allergies/ADRs: Reviewed in chart  Past Medical History: Reviewed in chart  Tobacco: She reports that she has never smoked. She has never used smokeless tobacco.  Alcohol: 1-3 beverages / week- typically 2 glasses of wine/week at the most or occasional Gin and Tonic  Social: her  is a pharmacist     Medication Adherence/Access: no issues reported    Parkinson's Disease:    carbidopa-levodopa  mg, 1 tablet in the morning, 2 tablets in the afternoon, 1 tablet at night  Melatonin 10 mg nightly  Patient reports that she has occasionally taken 1 extra tablet of carbidopa-levodopa in the morning when feeling shaky or less steady and she finds this helpful. She prefers to not increase her dosage of carbidopa-levodopa at this time.     Pain:   Gabapentin 600 mg 3 times/day   She tried weaning down the gabapentin again and reports she had more lower back pain and neck back so she returned to her previous dosage and would like to stay on this medication for now.     GERD:   Since our last visit, she weaned off omeprazole and has been off for 4 days now. Reports no GI issues or cramping after meals.      Constipation:  Not currently taking anything. Reports the constipation improved spontaneously and she hasn't needed to try drinking the prune juice like we discussed     Today's Vitals: There were no vitals taken for this visit.  ----------------    I spent 9 minutes with this patient today. All changes were made via collaborative practice agreement with Dr. Lujan. A copy of the visit note was provided to the patient's provider(s).    A summary of these recommendations was sent via [employer].    Pat Perez, Pharm.D.  Medication Therapy Management Pharmacist  [facility]    Telemedicine Visit Details  Type of service:  Video Conference via [employer]  Start Time:  1:07 PM  End Time: 1:16 PM     Medication Therapy Recommendations  No medication therapy recommendations to display

## 2023-11-29 NOTE — PATIENT INSTRUCTIONS
"Recommendations from today's MTM visit:                                                      Okay to continue the extra tablet of carbidopa-levodopa as-needed. If you are finding that you are taking the extra dose on a regular basis then we may decide to schedule it into your daily routine.  You may stay on the gabapentin 600 mg 3 times/day as it seems to be helpful for your pain.     Follow-up: yearly or as needed    It was great speaking with you today.  I value your experience and would be very thankful for your time in providing feedback in our clinic survey. In the next few days, you may receive an email or text message from [employer] with a link to a survey related to your  clinical pharmacist.\"     To schedule another MTM appointment, please call the clinic directly or you may call the MTM scheduling line at [phone] or toll-free at [phone].     My Clinical Pharmacist's contact information:                                                      Please feel free to contact me with any questions or concerns you have.      Pat Perez, Pharm.D.  Medication Therapy Management Pharmacist  [facility]     "

## 2024-01-08 PROBLEM — E04.1 THYROID NODULE: Status: ACTIVE | Noted: 2022-12-07

## 2024-01-08 PROBLEM — M48.062 LUMBAR STENOSIS WITH NEUROGENIC CLAUDICATION: Status: ACTIVE | Noted: 2023-02-08

## 2024-01-08 PROBLEM — G20.A1 PARKINSON'S DISEASE (H): Status: ACTIVE | Noted: 2017-06-30

## 2024-01-08 NOTE — PROGRESS NOTES
Diagnosis/Summary/Recommendations:    PATIENT: Ne Padilla  68 year old female     : 1955    PARESH: 2024    MRN: [id number]  [address]   [facility] [address]     [phone] (M)   [phone] (H)         lives in [place]  [phone] (M)  [phone] (H)  email listed - anandat set up  No active proxy  Mark spouse  [phone] home  [email or web address]  [phone]         [phone] - use this     7 grand kids - busy   On the [place]     Visit with Nadir Kan to review genetic testing related to Ulcerative colitis as he father had this and ALS  Her great grandfather had tremor and there are numerous family members with tremor, autoimmune conditions      She was not anemic 2021 - no  Iron stores done        Assessment:  (G20) Parkinson's disease (H)  (primary encounter diagnosis)  Carbidopa/levodopa 25/100 sinemet tab 3/day and 1 prn     (Z82.0) Family hx of ALS (amyotrophic lateral sclerosis)  - father Singh Biswas  1933  (Z82.0) Family history of tremor great grand father  (Z81.8) Family history of dementia father   (Z83.79) Family history of ulcerative colitis father  Mother has had tremor   Brother has a gi problem  Sister with rheumatoid arthritis  Retired.     PD GENERATION study  Did not have any of the 7 variants in her genetic study with Dr. Kuhn     Review of diagnosis    Parkinson     Avoidance of dopamine blockers   Not taking     Motor complication review   1 prn sinemet on days active     Review of Impulse control disorders   No      Review of surgical or medication options   yes     Gait/Balance/Falls   Had one fall last night when her foot feel asleep and got up and her foot collapsed and had partial fall     Exercise/Therapy performed/offered   Walking  Doing exercises  Consider doing yoga  [email or web address]     Sarah Dooley Allison Alpers speech      Sary Sol is her pcp through [facility]     Cognitive/Driving - denies     Mood  -    Stress  Brother in eating program - Deb - very low BMI- in and out Den[employer]ational  Other stressor with family member with separation     Hallucinations/delusions  - not     Sleep  - has wild dreams;   Melatonin 5mg nightly which has helped reduced dream behaviors.      Sleep is better     Talked about sleep needs and has been having problems sleeping at night - insomnia; may have some RLS type of features - electric feeling - this can happen when trying t fall asleep >> than at 330am after falling asleep.     Bladder  - seen urology   stress urinary incontinence  Considering a procedure for this. Medication did not work.   Considering botox for her bladder, PTNS and interstim, Kegel, etc.      Will see urology after foot surgery  [place]     GI/Constipation/GERD   Nonformulary magnesium glycinate 100mg twice daily  Ondansetron zofran 4mg ODT - not using     ENDO -   Calcium Carbonate 600 vitamin D 400  Cholecalciferol vitamin D3 4000 units - see aboove  Fish oil-3 fatty acids 100mg - stopped  Glucosamine-chondroitin twice daily      Nodule found on thyroid and was ultrasound and had followup     Has sweating issues. - discussed that it is unlikely related to her thyroid which was checked back       Cardio/heart -       2023   Pat Name:     ANTONIA NEGRETE              Department:   [facility]   Patient ID:   [id number]                    Room:           Gender:       F                        Technician:     :          1955               Requested By: JONNATHAN STEEL MD   Order Number: [id number]                Reading MD:   GUANAKO Chery                                    Measurements   Intervals                              Axis            Rate:         80                       P:            64   MI:           173                      QRS:          -5   QRSD:         73                       T:            63   QT:           310                                      QTc:          358                   "                                               Interpretive Statements   SINUS RHYTHM WITH SINUS ARRHYTHMIA   POSSIBLE LEFT ATRIAL ENLARGEMENT [-0.1mV P WAVE IN V1/V2]   NONSPECIFIC T-WAVE ABNORMALITY   Compared to ECG 01/04/2021 16:15:00   T-wave abnormality now present   Electronically Signed On 2-1-2023 10:39:18 CST by GUANAKO Chery      Vision - glasses     Heme  -      Other:     Right bunion surgery 2021   Left bunion surgery Friday      Ibuprofen 200mg as needed     Meclizine ever other night - on wean   Rare meloxicam mobic      Tizanidine zanaflex 4mg 1/2 every other night. - not using     For musculoskeletal pain  Duloxetine cymbalta 60mg  Gabapentin neurontin 2 x 300mg 3/day     Bunion on the other foot being done this Friday     Dr. Yasmeen Moctezuma foot surgeon    Back surgery and recovery  Had withdrawal off narcotics     Stressful year     Still having dreams and they are better with melatonin 2 x 5mg     Pain is managed  Fluoxetine 20mg - consider wean  Duloxetine 60mg - if needed consider dose increase     October 2023 will go to [facility]  April 2024 will go to the [facility]  May 2023 was in [place], [facility] and [facility]     Stomach medication  Discussion about omeprazole (prilosec)  Discussion on pantoprazole, tums, etc.      Pharmacy ([facility]) consultation and medication management    Bowel frequency is every 2-3 days  Only taking a probiotic  Also taking magnesium product for cramping  Diet is relatively healthy - not taking prunes, etc.      Has incontinence and seeing urologist and trying things and \"nothing has worked\"  Has tried mirabegron myrbetriq and did not work - unclear as to dose and duration 6 weeks  Has not tried botox  Has not done PTNS  [place]rologist is [place] [facility].   She has leakage with sudden movements  Has tried pelvic floor physical therapy which was interrupted and has not been back.   Denies urgency  Nocturia - seldom  Has some frequency 5/6 times per day  Has not been to see " them  Urology for gyn urology for stress urinary incontinence     Has has a slight worsening in her balance.   Walking 45 minutes daily -  once or twice daily  Doing upper body weights  Did big and loud in the past   This was re ordered     Her legs feel heavy at times  She has self adjusted her medications.   Still using 1 in the morning, 2 at 3pm and 1 in the evening      Her son in law in  [place] was recently diagnosed with Parkinson  Had additional discussion about options like antibody therapies, treadmill study  Encouraged genetic testing  Pdgeneration.      ECG today if possible as she is taking medications that can affect her QTc interval  Had an ECG 2/2023 prior to these medications.      ECG on review by me today was normal     Medications     8am noon 3pm 1030   Acetaminophen tylenol 500mg  prn         Calcium Carbonate 600 vitamin D 400   1       Carbidopa/levodopa 25/100 sinemet 1   2 1  and prn   Cholecalciferol vitamin D3 2000 units 50mcg below 1       Duloxetine cymbalta 60mg  1         Fluoxetine prozac 20mg       1   Gabapentin neurontin 300mg 2   2 2   Glucosamine-chondroitin   2   1   Ibuprofen advil 200mg prn         Magnesium glycinate 100mg below         Meclizine antivert 25mg 1/2 prn         Melatonin  5mg       2   Meloxicam mobic 15mg prn         Nonformulary mag glycinate 100mg  Above 1   1     Omeprazole prilosec 20mg ?1         Probiotic  1         Vit D3 cholecalciferol 50mcg 2000 units  1                                            Plan:    Back surgery 2/8/2023 fusion  Had 2 rods put in   It was helpful but wondering if there is more she can do to remain healthy longer    She has leg symptoms at night   Has electrical impulses  She may have to get up and move her legs  This is 1130 pm  Lays down at 11 or 1115p  And she takes a dose 1030pm and not clear if helps her leg symptoms.  Consider taking the sinemet at 930 or 10pm and to take 1 and if not helping then 1.5 tabs and if  "needed 2 tabs then.     Pharmacy ([facility]) consultation and medication management  Please call the scheduling number @ [phone] to set up an appointment with pharmacists Pat Perez or Aimee Brown.       She is physically active - yoga like and weight bearing  She will try to resume yoga through [employer].   The physical activity is impacted by her urinary problems.     Bon Mercer is her son in law     Pd generation - free - stated she had this done and there was not a gene.   Impression/Plan: Parkinson's disease, not due to a mutation in SNCA, GBA, LRRK2, ivan, DJ1, or PINK1.   [email or web address]   [phone]  [email or web address]/advancing-research/[email or web address]-research/[email or web address]    Nadir palomino genetic counsellor   [email or web address]  250 dollars     Urinary issues persist  Recommend seeing urology  May consider seeing physical therapy again    She has not had significant constipation  She has some leakage if she is coughing or sneezing  If she walks faster she has leakage  She has urinary frequency by choice rather than urge  She denies significant nocturia - once per night.   She is using heavy pads  She had done physical therapy in the past and was not that helpful.     From before  Has incontinence and seeing urologist and trying things and \"nothing has worked\"  Has tried mirabegron myrbetriq and did not work - unclear as to dose and duration 6 weeks  Has not tried botox  Has not done PTNS  [place]ist [place] [facility].   She has leakage with sudden movements  Has tried pelvic floor physical therapy which was interrupted and has not been back.   Denies urgency  Nocturia - seldom  Has some frequency 5/6 times per day  Has not been to see them  Urology for gyn urology for stress urinary incontinence    [facility]  [address] [place] [address]      Bessy Mtz, Ph.D., LP  [phone]    Kiara Clark,Ph.D.,LP  [phone] " (inpatient)    More Jackson,Ph.D.,  [phone]    Symone De, Ph.D.  [phone]    Lea Flowers, Ph.D.,   [phone]    Vidal Fitch, Ph.D., ABPP,   [phone]    Eliz Oconnor,Ph.D.,   [phone]    Geovanna Kendrick psychologist  [phone]   [address] [place] [address].        Coding statement:   Medical Decision Making:  #  Chronic progressive medical conditions addressed  - see above --   Review and/or interpretation of unique test or documentation from a provider outside of neurology no   Independent historian provided additional details  no I  Prescription drug management and review of potential side effects and/or monitoring for side effects  -- see above ---  Health impacted by social determinants of health  no    I have reviewed the note as documented above.  This accurately captures the substance of my conversation with the patient and total time spent preparing for visit, executing visit and completing visit on the day of the visit:  40 minutes.  The portion of this total time included face to face time 31 minutes    Damien Lujan MD     ______________________________________    Last visit date and details:             ______________________________________      Patient was asked about 14 Review of systems including changes in vision (dry eyes, double vision), hearing, heart, lungs, musculoskeletal, depression, anxiety, snoring, RBD, insomnia, urinary frequency, urinary urgency, constipation, swallowing problems, hematological, ID, allergies, skin problems: seborrhea, endocrinological: thyroid, diabetes, cholesterol; balance, weight changes, and other neurological problems and these were not significant at this time except for   Patient Active Problem List   Diagnosis    Family hx of ALS (amyotrophic lateral sclerosis)    Family history of parkinsonism    Family history of dementia    Family history of ulcerative colitis    BPPV  "(benign paroxysmal positional vertigo)    Dysplastic nevi    Intervertebral cervical disc disorder with myelopathy, cervical region    Parkinson's disease, unspecified whether dyskinesia present, unspecified whether manifestations fluctuate    Abnormal reflex    Disease of spinal cord, unspecified (H)    Urinary incontinence    Lumbar stenosis with neurogenic claudication    Thyroid nodule          Allergies   Allergen Reactions    Other Environmental Allergy Other (See Comments)     \"Sneezing\"  Other reaction(s): Respiratory Distress  Animal Dander (sneezing)    Sweet Corn (Diagnostic)      Other reaction(s): Respiratory Distress  \"Sneezing to Sweet Corn\"    Ciprofloxacin Rash    Hydrocodone-Acetaminophen Other (See Comments)     nightmares    Morphine Other (See Comments)     Nightmares    Animal Dander Difficulty breathing     \"sneezing\"    No Clinical Screening - See Comments Difficulty breathing     \"sneezing to melon & sweet corn\"    Prochlorperazine Other (See Comments)     \" tongue goes in & out & my stomach contracts\"  \"Tongue goes in & out & my stomach contracts\"    Cephalexin Rash     Past Surgical History:   Procedure Laterality Date    APPENDECTOMY      BACK SURGERY  02/08/2023    lumbar fusion L2-5 with heide    FOOT SURGERY Right 01/2021    bunion surgery    FOOT SURGERY Left     will be done 10/2021 bunion    NECK SURGERY  2012    cervical laminectomy C56     Past Medical History:   Diagnosis Date    Family history of dementia 04/09/2020    Family history of tremor     great grandfather    Family history of ulcerative colitis 04/09/2020    Family hx of ALS (amyotrophic lateral sclerosis) 04/09/2020     Social History     Socioeconomic History    Marital status:      Spouse name: Not on file    Number of children: Not on file    Years of education: Not on file    Highest education level: Not on file   Occupational History    Not on file   Tobacco Use    Smoking status: Never    Smokeless tobacco: " Never   Substance and Sexual Activity    Alcohol use: Yes    Drug use: Not on file    Sexual activity: Not on file   Other Topics Concern    Not on file   Social History Narrative    Not on file     Social Determinants of Health     Financial Resource Strain: Not on file   Food Insecurity: Not on file   Transportation Needs: Not on file   Physical Activity: Not on file   Stress: Not on file   Social Connections: Not on file   Interpersonal Safety: Not on file   Housing Stability: Not on file       Drug and lactation database from the [place] National Library of Medicine:  http://toxnet.nlm.nih.gov/cgi-bin/sis/htmlgen?LACT      B/P: Data Unavailable, T: Data Unavailable, P: Data Unavailable, R: Data Unavailable 0 lbs 0 oz  There were no vitals taken for this visit., There is no height or weight on file to calculate BMI.  Medications and Vitals not listed above were documented in the cart and reviewed by me.     Current Outpatient Medications   Medication Sig Dispense Refill    acetaminophen (TYLENOL) 500 MG tablet Take 2 tablets (1,000 mg) by mouth daily as needed for mild pain      calcium carbonate 600 mg-vitamin D 400 units (CALTRATE) 600-400 MG-UNIT per tablet Take 1 tablet by mouth 2 times daily 90 tablet 3    carbidopa-levodopa (SINEMET)  MG tablet 1 tablet at 8 am, 2 @ 3pm and 1 at 10 pm and 1 tab as needed. 360 tablet 11    DULoxetine (CYMBALTA) 60 MG capsule Take 60 mg by mouth every morning 90 capsule 3    gabapentin (NEURONTIN) 300 MG capsule Take 600 mg by mouth 3 times daily 540 capsule 3    Glucosamine-Chondroit-Vit C-Mn (GLUCOSAMINE CHONDROITIN 1500 COMPLEX) CAPS Take 1 capsule by mouth daily      ibuprofen (ADVIL/MOTRIN) 200 MG tablet Take 2-3 tablets (400-600 mg) by mouth every 4 hours as needed for mild pain      MAGNESIUM GLYCINATE PO Take 100 mg by mouth 2 times daily      meclizine (ANTIVERT) 25 MG tablet 1/2 to 1 tab of 25mg by mouth daily as needed      Melatonin 5 MG CHEW 2 x 5mg  chew by mouth nightly at 10pm      meloxicam (MOBIC) 15 MG tablet Take 1 tablet (15 mg) by mouth daily as needed      Probiotic Product (PROBIOTIC DAILY PO) Take 1 capsule by mouth daily ([employer]'s Bounty brand)      vitamin D3 (CHOLECALCIFEROL) 50 mcg (2000 units) tablet Take 1 tablet by mouth daily           Damien Lujan MD

## 2024-02-05 ENCOUNTER — OFFICE VISIT (OUTPATIENT)
Dept: NEUROLOGY | Facility: CLINIC | Age: 69
End: 2024-02-05
Payer: COMMERCIAL

## 2024-02-05 VITALS
HEART RATE: 72 BPM | BODY MASS INDEX: 21.35 KG/M2 | SYSTOLIC BLOOD PRESSURE: 87 MMHG | WEIGHT: 116 LBS | HEIGHT: 62 IN | DIASTOLIC BLOOD PRESSURE: 58 MMHG

## 2024-02-05 DIAGNOSIS — G20.C PARKINSONISM, UNSPECIFIED PARKINSONISM TYPE (H): Primary | ICD-10-CM

## 2024-02-05 DIAGNOSIS — N39.41 URGE INCONTINENCE OF URINE: ICD-10-CM

## 2024-02-05 DIAGNOSIS — F43.21 ADJUSTMENT DISORDER WITH DEPRESSED MOOD: ICD-10-CM

## 2024-02-05 PROCEDURE — 99215 OFFICE O/P EST HI 40 MIN: CPT | Performed by: PSYCHIATRY & NEUROLOGY

## 2024-02-05 RX ORDER — CARBIDOPA AND LEVODOPA 25; 100 MG/1; MG/1
TABLET ORAL
Qty: 360 TABLET | Refills: 11 | Status: SHIPPED | OUTPATIENT
Start: 2024-02-05

## 2024-02-05 NOTE — PATIENT INSTRUCTIONS
Medications     8am noon 3pm 1030   Acetaminophen tylenol 500mg  prn         Calcium Carbonate 600 vitamin D 400   1       Carbidopa/levodopa 25/100 sinemet 1   2 1  and prn   Cholecalciferol vitamin D3 2000 units 50mcg below 1       Duloxetine cymbalta 60mg  1         Fluoxetine prozac 20mg       1   Gabapentin neurontin 300mg 2   2 2   Glucosamine-chondroitin   2   1   Ibuprofen advil 200mg prn         Magnesium glycinate 100mg below         Meclizine antivert 25mg 1/2 prn         Melatonin  5mg       2   Meloxicam mobic 15mg prn         Nonformulary mag glycinate 100mg  Above 1   1     Omeprazole prilosec 20mg ?1         Probiotic  1         Vit D3 cholecalciferol 50mcg 2000 units  1                                            Plan:    Back surgery 2/8/2023 fusion  Had 2 rods put in   It was helpful but wondering if there is more she can do to remain healthy longer    She has leg symptoms at night   Has electrical impulses  She may have to get up and move her legs  This is 1130 pm  Lays down at 11 or 1115p  And she takes a dose 1030pm and not clear if helps her leg symptoms.  Consider taking the sinemet at 930 or 10pm and to take 1 and if not helping then 1.5 tabs and if needed 2 tabs then.     Pharmacy ([facility]) consultation and medication management  Please call the scheduling number @ [phone] to set up an appointment with pharmacists Pat Perez or Aimee Brown.       She is physically active - yoga like and weight bearing  She will try to resume yoga through [employer].   The physical activity is impacted by her urinary problems.     Bon Mercer is her son in law     Pd generation - free - stated she had this done and there was not a gene.   Impression/Plan: Parkinson's disease, not due to a mutation in SNCA, GBA, LRRK2, ivan, DJ1, or PINK1.   [email or web address]   [phone]  [email or web address]://www.[email or web address]/advancing-research/our-research/pdgeneration    Nadir palomino genetic counsellor  "  [email or web address]  250 dollars     Urinary issues persist  Recommend seeing urology  May consider seeing physical therapy again    She has not had significant constipation  She has some leakage if she is coughing or sneezing  If she walks faster she has leakage  She has urinary frequency by choice rather than urge  She denies significant nocturia - once per night.   She is using heavy pads  She had done physical therapy in the past and was not that helpful.     From before  Has incontinence and seeing urologist and trying things and \"nothing has worked\"  Has tried mirabegron myrbetriq and did not work - unclear as to dose and duration 6 weeks  Has not tried botox  Has not done PTNS  [place]rologist is [place] [facility].   She has leakage with sudden movements  Has tried pelvic floor physical therapy which was interrupted and has not been back.   Denies urgency  Nocturia - seldom  Has some frequency 5/6 times per day  Has not been to see them  Urology for gyn urology for stress urinary incontinence    [facility]  [address] [place] [address]      Bessy Mtz, Ph.D.,   [phone]    Kiara Clark,Ph.D.,  [phone] (inpatient)    More Jackson,Ph.D.,  [phone]    Symone De, Ph.D.  [phone]    Lea Flowers, Ph.D.,   [phone]    Vidal Fitch, Ph.D., ABPP, LP  [phone]    Eliz Oconnor,Ph.D.,   [phone]    Geovanna Kendrick psychologist  [phone]   [facility]  [address] [facility] [address]  [place] [address].    "

## 2024-02-05 NOTE — NURSING NOTE
"Ne Padilla's goals for this visit include:   Chief Complaint   Patient presents with    Parkinson      6 Month follow up       She requests these members of her care team be copied on today's visit information: yes    PCP: Sary Fuller    Referring Provider:  No referring provider defined for this encounter.    BP (!) 87/58   Pulse 72   Ht 1.575 m (5' 2\")   Wt 52.6 kg (116 lb)   BMI 21.22 kg/m      Do you need any medication refills at today's visit? WALDO Newman, CMA ([facility])      "

## 2024-02-05 NOTE — LETTER
2024         RE: Ne Padilla  [address] [facility] [address]        Dear Colleague,    Thank you for referring your patient, Ne Padilla, to the [facility] NEUROLOGY CLINIC [place]. Please see a copy of my visit note below.        Diagnosis/Summary/Recommendations:    PATIENT: Ne Padilla  68 year old female     : 1955    PARESH: 2024    MRN: [id number]  [address] [facility] [address]     [phone] (M)   [phone] (H)         lives in [place]  [phone] (M)  [phone] (H)  email listed - quinn set up  No active proxy  Mark spouse  [phone] home  [email or web address]  [phone]         [phone] - use this     7 grand kids - busy   [place] the [place]     Visit with Nadir Kan to review genetic testing related to Ulcerative colitis as he father had this and ALS  Her great grandfather had tremor and there are numerous family members with tremor, autoimmune conditions      She was not anemic 2021 - no  Iron stores done        Assessment:  (G20) Parkinson's disease (H)  (primary encounter diagnosis)  Carbidopa/levodopa 25/100 sinemet tab 3/day and 1 prn     (Z82.0) Family hx of ALS (amyotrophic lateral sclerosis)  - father Singh Biswas  1933  (Z82.0) Family history of tremor great grand father  (Z81.8) Family history of dementia father   (Z83.79) Family history of ulcerative colitis father  Mother has had tremor   Brother has a gi problem  Sister with rheumatoid arthritis  Retired.     PD GENERATION study  Did not have any of the 7 variants in her genetic study with Dr. Kuhn     Review of diagnosis    Parkinson     Avoidance of dopamine blockers   Not taking     Motor complication review   1 prn sinemet on days active     Review of Impulse control disorders   No      Review of surgical or medication options   yes     Gait/Balance/Falls   Had one fall last night when her foot feel asleep and got up and her foot  collapsed and had partial fall     Exercise/Therapy performed/offered   Walking  Doing exercises  Consider doing yoga  [email or web address]     Maddy Nolanison Alpers speech      Okeefe Sweta is her pcp through [facility]     Cognitive/Driving - denies     Mood  -   Stress  Brother in eating program - Deb - very low BMI- in and out [employer]  Other stressor with family member with separation     Hallucinations/delusions  - not     Sleep  - has wild dreams;   Melatonin 5mg nightly which has helped reduced dream behaviors.      Sleep is better     Talked about sleep needs and has been having problems sleeping at night - insomnia; may have some RLS type of features - electric feeling - this can happen when trying t fall asleep >> than at 330am after falling asleep.     Bladder  - seen urology   stress urinary incontinence  Considering a procedure for this. Medication did not work.   Considering botox for her bladder, PTNS and interstim, Kegel, etc.      Will see urology after foot surgery  Rober     GI/Constipation/GERD   Nonformulary magnesium glycinate 100mg twice daily  Ondansetron zofran 4mg ODT - not using     ENDO -   Calcium Carbonate 600 vitamin D 400  Cholecalciferol vitamin D3 4000 units - see aboove  Fish oil-3 fatty acids 100mg - stopped  Glucosamine-chondroitin twice daily      Nodule found on thyroid and was ultrasound and had followup     Has sweating issues. - discussed that it is unlikely related to her thyroid which was checked back       Cardio/heart -       2023   Pat Name:     ANTONIA NEGRETE              Department:   [facility]   Patient ID:   [id number]                    Room:           Gender:       F                        Technician:     :          1955               Requested By: JONNATHAN STEEL MD   Order Number: [id number]                Reading MD:   GUANAKO Chery                                    Measurements   Intervals                              Axis          "   Rate:         80                       P:            64   MN:           173                      QRS:          -5   QRSD:         73                       T:            63   QT:           310                                      QTc:          358                                                                 Interpretive Statements   SINUS RHYTHM WITH SINUS ARRHYTHMIA   POSSIBLE LEFT ATRIAL ENLARGEMENT [-0.1mV P WAVE IN V1/V2]   NONSPECIFIC T-WAVE ABNORMALITY   Compared to ECG 01/04/2021 16:15:00   T-wave abnormality now present   Electronically Signed On 2-1-2023 10:39:18 CST by GUANAKO Chery      Vision - glasses     Heme  -      Other:     Right bunion surgery 2021   Left bunion surgery Friday      Ibuprofen 200mg as needed     Meclizine ever other night - on wean   Rare meloxicam mobic      Tizanidine zanaflex 4mg 1/2 every other night. - not using     For musculoskeletal pain  Duloxetine cymbalta 60mg  Gabapentin neurontin 2 x 300mg 3/day     Bunion on the other foot being done this Friday     Dr. Yasmeen Moctezuma foot surgeon    Back surgery and recovery  Had withdrawal off narcotics     Stressful year     Still having dreams and they are better with melatonin 2 x 5mg     Pain is managed  Fluoxetine 20mg - consider wean  Duloxetine 60mg - if needed consider dose increase     October 2023 will go to [facility]  April 2024 will go to the [facility]  May 2023 was in [place], [facility] and [facility]     Stomach medication  Discussion about omeprazole (prilosec)  Discussion on pantoprazole, tums, etc.      Pharmacy ([facility]) consultation and medication management    Bowel frequency is every 2-3 days  Only taking a probiotic  Also taking magnesium product for cramping  Diet is relatively healthy - not taking prunes, etc.      Has incontinence and seeing urologist and trying things and \"nothing has worked\"  Has tried mirabegron myrbetriq and did not work - unclear as to dose and duration 6 weeks  Has not tried " botox  Has not done PTNS  [place]rologist is [place] [facility].   She has leakage with sudden movements  Has tried pelvic floor physical therapy which was interrupted and has not been back.   Denies urgency  Nocturia - seldom  Has some frequency 5/6 times per day  Has not been to see them  Urology for gyn urology for stress urinary incontinence     Has has a slight worsening in her balance.   Walking 45 minutes daily -  once or twice daily  Doing upper body weights  Did big and loud in the past   This was re ordered     Her legs feel heavy at times  She has self adjusted her medications.   Still using 1 in the morning, 2 at 3pm and 1 in the evening      Her son in [facility] in  [place] was recently diagnosed with Parkinson  Had additional discussion about options like antibody therapies, treadmill study  Encouraged genetic testing  Pdgeneration.      ECG today if possible as she is taking medications that can affect her QTc interval  Had an ECG 2/2023 prior to these medications.      ECG on review by me today was normal     Medications     8am noon 3pm 1030   Acetaminophen tylenol 500mg  prn         Calcium Carbonate 600 vitamin D 400   1       Carbidopa/levodopa 25/100 sinemet 1   2 1  and prn   Cholecalciferol vitamin D3 2000 units 50mcg below 1       Duloxetine cymbalta 60mg  1         Fluoxetine prozac 20mg       1   Gabapentin neurontin 300mg 2   2 2   Glucosamine-chondroitin   2   1   Ibuprofen advil 200mg prn         Magnesium glycinate 100mg below         Meclizine antivert 25mg 1/2 prn         Melatonin  5mg       2   Meloxicam mobic 15mg prn         Nonformulary mag glycinate 100mg  Above 1   1     Omeprazole prilosec 20mg ?1         Probiotic  1         Vit D3 cholecalciferol 50mcg 2000 units  1                                            Plan:    Back surgery 2/8/2023 fusion  Had 2 rods put in   It was helpful but wondering if there is more she can do to remain healthy longer    She has leg symptoms at night   Has  "electrical impulses  She may have to get up and move her legs  This is 1130 pm  Lays down at 11 or 1115p  And she takes a dose 1030pm and not clear if helps her leg symptoms.  Consider taking the sinemet at 930 or 10pm and to take 1 and if not helping then 1.5 tabs and if needed 2 tabs then.     Pharmacy ([facility]) consultation and medication management  Please call the scheduling number @ [phone] to set up an appointment with pharmacists Pat Perez or Aimee Brown.       She is physically active - yoga like and weight bearing  She will try to resume yoga through [employer].   The physical activity is impacted by her urinary problems.     Bon Mercer is her son in law     Pd generation - free - stated she had this done and there was not a gene.   Impression/Plan: Parkinson's disease, not due to a mutation in SNCA, GBA, LRRK2, ivan, DJ1, or PINK1.   [email or web address]   [phone]  https://www.parkinson.org/advancing-research/our-research/pdgeneration    Nadir palomino genetic counsellor   [email or web address]  250 dollars     Urinary issues persist  Recommend seeing urology  May consider seeing physical therapy again    She has not had significant constipation  She has some leakage if she is coughing or sneezing  If she walks faster she has leakage  She has urinary frequency by choice rather than urge  She denies significant nocturia - once per night.   She is using heavy pads  She had done physical therapy in the past and was not that helpful.     From before  Has incontinence and seeing urologist and trying things and \"nothing has worked\"  Has tried mirabegron myrbetriq and did not work - unclear as to dose and duration 6 weeks  Has not tried botox  Has not done PTNS  [place] [facility].   She has leakage with sudden movements  Has tried pelvic floor physical therapy which was interrupted and has not been back.   Denies urgency  Nocturia - seldom  Has some frequency 5/6 times per " day  Has not been to see them  Urology for gyn urology for stress urinary incontinence    [facility]  Cl[facility]  [address] [place] [address]      Bessy Mtz, Ph.D.,   [phone]    Kiara Clark,Ph.D.,  [phone] (inpatient)    More Jackson,Ph.D.,  [phone]    Symone De, Ph.D.  [phone]    Lea Flowers, Ph.D.,   [phone]    Vidal Fitch, Ph.D., ABPP, LP  [phone]    Eilz Oconnor,Ph.D.,   [phone]    Geovanna Kendrick psychologist  [phone]   [place] [id number] [facility] [address] [place] [id number].        Coding statement:   Medical Decision Making:  #  Chronic progressive medical conditions addressed  - see above --   Review and/or interpretation of unique test or documentation from a provider outside of neurology no   Independent historian provided additional details  no I  Prescription drug management and review of potential side effects and/or monitoring for side effects  -- see above ---  Health impacted by social determinants of health  no    I have reviewed the note as documented above.  This accurately captures the substance of my conversation with the patient and total time spent preparing for visit, executing visit and completing visit on the day of the visit:  40 minutes.  The portion of this total time included face to face time 31 minutes    Damien Lujan MD     ______________________________________    Last visit date and details:             ______________________________________      Patient was asked about 14 Review of systems including changes in vision (dry eyes, double vision), hearing, heart, lungs, musculoskeletal, depression, anxiety, snoring, RBD, insomnia, urinary frequency, urinary urgency, constipation, swallowing problems, hematological, ID, allergies, skin problems: seborrhea, endocrinological: thyroid, diabetes, cholesterol; balance, weight changes, and other neurological  "problems and these were not significant at this time except for   Patient Active Problem List   Diagnosis     Family hx of ALS (amyotrophic lateral sclerosis)     Family history of parkinsonism     Family history of dementia     Family history of ulcerative colitis     BPPV (benign paroxysmal positional vertigo)     Dysplastic nevi     Intervertebral cervical disc disorder with myelopathy, cervical region     Parkinson's disease, unspecified whether dyskinesia present, unspecified whether manifestations fluctuate     Abnormal reflex     Disease of spinal cord, unspecified (H)     Urinary incontinence     Lumbar stenosis with neurogenic claudication     Thyroid nodule          Allergies   Allergen Reactions     Other Environmental Allergy Other (See Comments)     \"Sneezing\"  Other reaction(s): Respiratory Distress  Animal Dander (sneezing)     Sweet Corn (Diagnostic)      Other reaction(s): Respiratory Distress  \"Sneezing to Sweet Corn\"     Ciprofloxacin Rash     Hydrocodone-Acetaminophen Other (See Comments)     nightmares     Morphine Other (See Comments)     Nightmares     Animal Dander Difficulty breathing     \"sneezing\"     No Clinical Screening - See Comments Difficulty breathing     \"sneezing to melon & sweet corn\"     Prochlorperazine Other (See Comments)     \" tongue goes in & out & my stomach contracts\"  \"Tongue goes in & out & my stomach contracts\"     Cephalexin Rash     Past Surgical History:   Procedure Laterality Date     APPENDECTOMY       BACK SURGERY  02/08/2023    lumbar fusion L2-5 with heide     FOOT SURGERY Right 01/2021    bunion surgery     FOOT SURGERY Left     will be done 10/2021 bunion     NECK SURGERY  2012    cervical laminectomy C56     Past Medical History:   Diagnosis Date     Family history of dementia 04/09/2020     Family history of tremor     great grandfather     Family history of ulcerative colitis 04/09/2020     Family hx of ALS (amyotrophic lateral sclerosis) 04/09/2020 "     Social History     Socioeconomic History     Marital status:      Spouse name: Not on file     Number of children: Not on file     Years of education: Not on file     Highest education level: Not on file   Occupational History     Not on file   Tobacco Use     Smoking status: Never     Smokeless tobacco: Never   Substance and Sexual Activity     Alcohol use: Yes     Drug use: Not on file     Sexual activity: Not on file   Other Topics Concern     Not on file   Social History Narrative     Not on file     Social Determinants of Health     Financial Resource Strain: Not on file   Food Insecurity: Not on file   Transportation Needs: Not on file   Physical Activity: Not on file   Stress: Not on file   Social Connections: Not on file   Interpersonal Safety: Not on file   Housing Stability: Not on file       Drug and lactation database from the [place] National Library of Medicine:  http://toxnet.nlm.nih.gov/cgi-bin/sis/htmlgen?LACT      B/P: Data Unavailable, T: Data Unavailable, P: Data Unavailable, R: Data Unavailable 0 lbs 0 oz  There were no vitals taken for this visit., There is no height or weight on file to calculate BMI.  Medications and Vitals not listed above were documented in the cart and reviewed by me.     Current Outpatient Medications   Medication Sig Dispense Refill     acetaminophen (TYLENOL) 500 MG tablet Take 2 tablets (1,000 mg) by mouth daily as needed for mild pain       calcium carbonate 600 mg-vitamin D 400 units (CALTRATE) 600-400 MG-UNIT per tablet Take 1 tablet by mouth 2 times daily 90 tablet 3     carbidopa-levodopa (SINEMET)  MG tablet 1 tablet at 8 am, 2 @ 3pm and 1 at 10 pm and 1 tab as needed. 360 tablet 11     DULoxetine (CYMBALTA) 60 MG capsule Take 60 mg by mouth every morning 90 capsule 3     gabapentin (NEURONTIN) 300 MG capsule Take 600 mg by mouth 3 times daily 540 capsule 3     Glucosamine-Chondroit-Vit C-Mn (GLUCOSAMINE CHONDROITIN 1500 COMPLEX) CAPS Take  1 capsule by mouth daily       ibuprofen (ADVIL/MOTRIN) 200 MG tablet Take 2-3 tablets (400-600 mg) by mouth every 4 hours as needed for mild pain       MAGNESIUM GLYCINATE PO Take 100 mg by mouth 2 times daily       meclizine (ANTIVERT) 25 MG tablet 1/2 to 1 tab of 25mg by mouth daily as needed       Melatonin 5 MG CHEW 2 x 5mg chew by mouth nightly at 10pm       meloxicam (MOBIC) 15 MG tablet Take 1 tablet (15 mg) by mouth daily as needed       Probiotic Product (PROBIOTIC DAILY PO) Take 1 capsule by mouth daily (Nature's Bounthi5 brand)       vitamin D3 (CHOLECALCIFEROL) 50 mcg (2000 units) tablet Take 1 tablet by mouth daily           Damien Lujan MD      Again, thank you for allowing me to participate in the care of your patient.        Sincerely,        Damien Lujan MD

## 2024-02-07 ENCOUNTER — TELEPHONE (OUTPATIENT)
Dept: NEUROLOGY | Facility: CLINIC | Age: 69
End: 2024-02-07
Payer: COMMERCIAL

## 2024-02-07 ENCOUNTER — TELEPHONE (OUTPATIENT)
Dept: UROLOGY | Facility: CLINIC | Age: 69
End: 2024-02-07
Payer: COMMERCIAL

## 2024-02-07 NOTE — TELEPHONE ENCOUNTER
[facility] referral from: [facility] visit (referral by provider)    [facility] referral outreach attempt #2 on February 7, 2024 at 11:08 AM      Outcome: Patient not reachable after several attempts, will route to [facility] Pharmacist/Provider as an FYI.  [facility] scheduling number is .  Thank you for the referral.    Use hbc for the carrier/Plan on the flowsheet      [place] Message Sent    CESAR Caruso

## 2024-02-07 NOTE — TELEPHONE ENCOUNTER
2/7 Called patient and left voicemail. Provided patient with [phone] as a call back number.     Provided patient with information noted below. Dr. Tolentino is okay with meeting with patient on 3/4/2024. Will keep appointment as scheduled.     Narcisa headley Complex   Dermatology, Surgery, Urology  [facility] and Surgery [facility]        ----- Message from Faye Tolentino MD sent at 2/6/2024  3:46 PM CST -----  That's fine.  I can see her  ----- Message -----  From: Bette Prado, RN  Sent: 2/6/2024   3:44 PM CST  To: Narcisa Herman; Faye Tolentino MD; #    Hello Dr. Tolentino,    Are you able to review the note below and advise on plan for this new consult?     Thank you,    Bette Prado RN, BSN      ----- Message -----  From: Narcisa Herman  Sent: 2/6/2024  11:08 AM CST  To: [facility] Urology Adult [place]    Patient scheduled with Dr. Tolentino on 3/4 as a New Patient for Incontinence. Attempted to reschedule with María or Bessy, and patient stated she needs to see a provider that has knowledge with Parkinson's as that is a contributing factor. Any additional insight?     Narcisa headley Complex   Dermatology, Surgery, Urology  [facility] and [facility]

## 2024-02-09 NOTE — TELEPHONE ENCOUNTER
Pt called to confirm the below information-no further follow up calls needed, FYI only, thank you!

## 2024-03-04 NOTE — TELEPHONE ENCOUNTER
MEDICAL RECORDS REQUEST   [facility]  Urology Clinic  [address] [place] [address]  PHONE: [phone]  Fax: [phone]        FUTURE VISIT INFORMATION                                                   Ne Padilla, : 1955 scheduled for future visit at [facility] Urology [facility]    APPOINTMENT INFORMATION:  Date: 3/06/2024  Provider:  Faye Tolentino MD  Reason for Visit/Diagnosis: Incontinence    REFERRAL INFORMATION:  Referring provider:  Damien Lujan MD in  NEUROLOGY    RECORDS REQUESTED FOR VISIT                                                     NOTES  STATUS/DETAILS   OFFICE NOTE from referring provider  yes, 2024, 2023 -- Damien Lujan MD in  NEUROLOGY   MEDICATION LIST  yes     PRE-VISIT CHECKLIST      Joint diagnostic appointment coordinated correctly          (ensure right order & amount of time) Yes   RECORD COLLECTION COMPLETE Yes

## 2024-03-05 ENCOUNTER — PRE VISIT (OUTPATIENT)
Dept: UROLOGY | Facility: CLINIC | Age: 69
End: 2024-03-05
Payer: COMMERCIAL

## 2024-03-05 NOTE — TELEPHONE ENCOUNTER
Reason for visit: consult     Relevant information: incontinence    Records/imaging/labs/orders: in epic    Pt called: No need for a call    At Rooming: virtual visit    Urban Galarza  3/5/2024  4:20 PM

## 2024-03-06 ENCOUNTER — PRE VISIT (OUTPATIENT)
Dept: UROLOGY | Facility: CLINIC | Age: 69
End: 2024-03-06

## 2024-03-06 ENCOUNTER — VIRTUAL VISIT (OUTPATIENT)
Dept: UROLOGY | Facility: CLINIC | Age: 69
End: 2024-03-06
Attending: PSYCHIATRY & NEUROLOGY
Payer: COMMERCIAL

## 2024-03-06 DIAGNOSIS — N39.3 STRESS INCONTINENCE: Primary | ICD-10-CM

## 2024-03-06 DIAGNOSIS — N95.8 GENITOURINARY SYNDROME OF MENOPAUSE: ICD-10-CM

## 2024-03-06 PROCEDURE — 99204 OFFICE O/P NEW MOD 45 MIN: CPT | Mod: 95 | Performed by: UROLOGY

## 2024-03-06 RX ORDER — ESTRADIOL 0.1 MG/G
2 CREAM VAGINAL
Qty: 42.5 G | Refills: 11 | Status: SHIPPED | OUTPATIENT
Start: 2024-03-07 | End: 2024-03-07

## 2024-03-06 ASSESSMENT — PAIN SCALES - GENERAL: PAINLEVEL: NO PAIN (0)

## 2024-03-06 NOTE — NURSING NOTE
Is the patient currently in the state of [place]? YES    Visit mode:VIDEO    If the visit is dropped, the patient can be reconnected by: VIDEO VISIT: Text to cell phone:   Telephone Information:   Mobile [phone]       Will anyone else be joining the visit? NO  (If patient encounters technical issues they should call [phone]    How would you like to obtain your AVS? MyChart    Are changes needed to the allergy or medication list? No    Reason for visit: Consult (New Urology)    Cortney JIANG

## 2024-03-06 NOTE — PATIENT INSTRUCTIONS
-start estrogen cream  -rx for Flyte  -f/u in 3-4 month, if symptoms continue then consider UDS and cystoscopy

## 2024-03-06 NOTE — PROGRESS NOTES
HPI:  Ne Padilla is a 68 year old female with urinary incontinence with any fast movement and some coughing.  She denies any significant urgency.  She has Parkinson's.  She has tried PT and Myrbetriq which did not help.    Reviewed previous notes from Dr. Lujan from 2/5/24    Exam:  There were no vitals taken for this visit.  GENERAL: alert and no distress  EYES: Eyes grossly normal to inspection.  No discharge or erythema, or obvious scleral/conjunctival abnormalities.  RESP: No audible wheeze, cough, or visible cyanosis.    SKIN: Visible skin clear. No significant rash, abnormal pigmentation or lesions.  NEURO: Cranial nerves grossly intact.  Mentation and speech appropriate for age.  PSYCH: Appropriate affect, tone, and pace of words        Assessment & Plan   67 y/o female with urinary incontinence that sounds like it is mostly stress urinary incontinence.  She also has some Genitourinary syndrome of menopause which could be also contributing to her dyspareunia.  We discussed options observation, Pelvital Flyte, estrogen cream, periurethral bulking, and   -start estrogen cream  -rx for Flyte  -f/u in 3-4 month, if symptoms continue then consider UDS and cystoscopy    Faye Tolentino MD  [facility] UROLOGY CLINIC [place]

## 2024-03-06 NOTE — PROGRESS NOTES
Virtual Visit Details    Type of service:  Video Visit   Video Start Time: 1:55 PM  Video End Time:2:20 PM    Originating Location (pt. Location): Home    Distant Location (provider location):  Off-site  Platform used for Video Visit: Maggie

## 2024-03-06 NOTE — LETTER
3/6/2024       RE: Ne Padilla  [address]   [facility] [address]     Dear Colleague,    Thank you for referring your patient, Ne Padilla, to the [facility] UROLOGY CLINIC [place] at . Please see a copy of my visit note below.    HPI:  Ne Padilla is a 68 year old female with urinary incontinence with any fast movement and some coughing.  She denies any significant urgency.  She has Parkinson's.  She has tried PT and Myrbetriq which did not help.    Reviewed previous notes from Dr. Lujan from 2/5/24    Exam:  There were no vitals taken for this visit.  GENERAL: alert and no distress  EYES: Eyes grossly normal to inspection.  No discharge or erythema, or obvious scleral/conjunctival abnormalities.  RESP: No audible wheeze, cough, or visible cyanosis.    SKIN: Visible skin clear. No significant rash, abnormal pigmentation or lesions.  NEURO: Cranial nerves grossly intact.  Mentation and speech appropriate for age.  PSYCH: Appropriate affect, tone, and pace of words    Assessment & Plan  67 y/o female with urinary incontinence that sounds like it is mostly stress urinary incontinence.  She also has some Genitourinary syndrome of menopause which could be also contributing to her dyspareunia.  We discussed options observation, Pelvital Flyte, estrogen cream, periurethral bulking, and   -start estrogen cream  -rx for Flyte  -f/u in 3-4 month, if symptoms continue then consider UDS and cystoscopy    Faye Tolentino MD  [facility] UROLOGY CLINIC [place]

## 2024-03-07 ENCOUNTER — TELEPHONE (OUTPATIENT)
Dept: UROLOGY | Facility: CLINIC | Age: 69
End: 2024-03-07
Payer: COMMERCIAL

## 2024-03-07 DIAGNOSIS — N95.8 GENITOURINARY SYNDROME OF MENOPAUSE: ICD-10-CM

## 2024-03-07 RX ORDER — ESTRADIOL 0.1 MG/G
2 CREAM VAGINAL
Qty: 42.5 G | Refills: 11 | Status: SHIPPED | OUTPATIENT
Start: 2024-03-07 | End: 2024-03-13

## 2024-03-07 NOTE — TELEPHONE ENCOUNTER
[facility] Call Center    Phone Message    May a detailed message be left on voicemail: yes     Reason for Call: Other: Pt would like a call back to discuss cream that was given to her. The price is too much and she would like to discuss other options. Please call patient back asap to discuss      Action Taken: Message routed to:  Other: uro    Travel Screening: Not Applicable

## 2024-03-08 ENCOUNTER — TELEPHONE (OUTPATIENT)
Dept: UROLOGY | Facility: CLINIC | Age: 69
End: 2024-03-08
Payer: COMMERCIAL

## 2024-03-13 DIAGNOSIS — N95.8 GENITOURINARY SYNDROME OF MENOPAUSE: ICD-10-CM

## 2024-03-13 RX ORDER — ESTRADIOL 0.1 MG/G
2 CREAM VAGINAL
Qty: 42.5 G | Refills: 11 | Status: SHIPPED | OUTPATIENT
Start: 2024-03-14

## 2024-06-22 ENCOUNTER — HEALTH MAINTENANCE LETTER (OUTPATIENT)
Age: 69
End: 2024-06-22

## 2024-06-24 ENCOUNTER — DOCUMENTATION ONLY (OUTPATIENT)
Dept: UROLOGY | Facility: CLINIC | Age: 69
End: 2024-06-24

## 2024-06-24 ENCOUNTER — OFFICE VISIT (OUTPATIENT)
Dept: UROLOGY | Facility: CLINIC | Age: 69
End: 2024-06-24
Payer: COMMERCIAL

## 2024-06-24 DIAGNOSIS — N39.3 STRESS INCONTINENCE: Primary | ICD-10-CM

## 2024-06-24 DIAGNOSIS — N95.8 GENITOURINARY SYNDROME OF MENOPAUSE: ICD-10-CM

## 2024-06-24 PROCEDURE — 99214 OFFICE O/P EST MOD 30 MIN: CPT | Performed by: UROLOGY

## 2024-06-24 NOTE — PROGRESS NOTES
Bladder sling consent completed by patient in clinic today. Sent for scanning will be viewable in 48-72 hours.     Jennifer García LPN on 6/24/2024 at 4:04 PM

## 2024-06-24 NOTE — NURSING NOTE
Ne Padilla's goals for this visit include: Follow up stress incontinence  She requests these members of her care team be copied on today's visit information:        [facility]   PCP: Sary Fuller    Referring Provider:  Sary Fuller PA-C  [address] [place] [id number]    There were no vitals taken for this visit.     Do you need any medication refills at today's visit?

## 2024-06-24 NOTE — PROGRESS NOTES
Reason for Visit:  F/u on urinary symptoms    Clinical Data:  Ne Padilla is a 68 year old female with urinary incontinence with any fast movement and some coughing.  She denies any significant urgency.  She has Parkinson's.  She has tried PT and Myrbetriq which did not help.  She was prescribed pelvital Flyte but she had difficulty obtaining this.    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      Assessment & Plan   67 y/o female with urinary incontinence that sounds like it is mostly stress urinary incontinence.  She also has some Genitourinary syndrome of menopause which could be also contributing to her dyspareunia.  We again discussed options of observation, Pelvital Flyte, estrogen cream, periurethral bulking, and sling.  She is interested in a midurethral sling.  We discussed the use of mesh in surgery and the risks which include but are not limited to infection, bleeding, exposure of mesh, vaginal pain, injury to the bladder/urethra/rectum or any structures in the abdomen or pelvis, as well as risk of incontinence or urinary retention. There is also risk of need for catheterization and possible further surgery. The pt. Verbalized understanding and had a chance to ask her questions which were all answered.    -continue estrogen cream  -schedule midurethral sling.    Faye Tolentino MD  [facility] [place]

## 2024-07-01 ENCOUNTER — TELEPHONE (OUTPATIENT)
Dept: UROLOGY | Facility: CLINIC | Age: 69
End: 2024-07-01
Payer: COMMERCIAL

## 2024-07-01 PROBLEM — N39.3 STRESS INCONTINENCE: Status: ACTIVE | Noted: 2024-06-24

## 2024-07-01 NOTE — TELEPHONE ENCOUNTER
7/1 Patient scheduled.     [facility]   [facility]ermat[facility], Surgery, [facility]  [facility]       BMI above normal limits, recommended weight loss, improve diet and follow up with internist.

## 2024-07-01 NOTE — TELEPHONE ENCOUNTER
Called patient to schedule surgery with Dr. Tolentino    Spoke with: Ne    Date of Surgery: 8/12    Approximate arrival time given:  Yes    Location of surgery: [facility]     Pre-Op H&P: [facility]    Post-Op Appt Date: Needs 2 week post op with PA.   Nothing available on schedule.   9/25 at 10:15 - 6 week post op with Dr. Tolentino, virtual     Imaging needed:  No    Discussed with patient pre-op RN will call 2-3 days prior to surgery with arrival time and instructions:  Yes    Discussed with patient PAC RN will provide arrival time and instructions for surgery at the time of the appointment: [[place] locations only]: Not Applicable      Packet sent out: Yes 07/01/24  via Mail - Standard      Additional Comments:  NA      All patients questions were answered and was instructed to review surgical packet and call back with any questions or concerns.       Bree Wayne on 7/1/2024 at 12:09 PM

## 2024-07-11 ENCOUNTER — CARE COORDINATION (OUTPATIENT)
Dept: UROLOGY | Facility: CLINIC | Age: 69
End: 2024-07-11
Payer: COMMERCIAL

## 2024-07-11 DIAGNOSIS — N39.3 STRESS INCONTINENCE: Primary | ICD-10-CM

## 2024-07-12 RX ORDER — ONDANSETRON 4 MG/1
TABLET, ORALLY DISINTEGRATING ORAL
COMMUNITY
Start: 2024-07-01 | End: 2024-08-05

## 2024-07-12 NOTE — PROGRESS NOTES
Diagnosis/Summary/Recommendations:    PATIENT: Ne Padilla  68 year old female     : 1955    PARESH: Aug 5, 2024       MRN: [id number]  [address]   [facility] [address]     [phone] (M)   [phone] (H)         lives in [place]  [phone] (M)  [phone] (H)  Mark spouse  [phone] home  [email or web address]  [id number]         [phone] - use this     7 grand kids - busy   [place]     Visit with Nadir Kan to review genetic testing related to Ulcerative colitis as he father had this and ALS  Her great grandfather had tremor and there are numerous family members with tremor, autoimmune conditions      She was not anemic 2021 - no  [employer] stores done        Assessment:  (G20) Parkinson's disease (H)  (primary encounter diagnosis)  Carbidopa/levodopa 25/100 sinemet tab 3/day and 1 prn     (Z82.0) Family hx of ALS (amyotrophic lateral sclerosis)  - father Singh Biswas  1933  (Z82.0) Family history of tremor great grand father  (Z81.8) Family history of dementia father   (Z83.79) Family history of ulcerative colitis father  Mother has had tremor   Brother has a gi problem  Sister with rheumatoid arthritis  Retired.     PD GENERATION study  Did not have any of the 7 variants in her genetic study with Dr. Kuhn     Review of diagnosis    Parkinson     Avoidance of dopamine blockers   Not taking     Motor complication review   1 prn sinemet on days active     Review of Impulse control disorders   No      Review of surgical or medication options   yes     Gait/Balance/Falls   Had one fall last night when her foot feel asleep and got up and her foot collapsed and had partial fall     Exercise/Therapy performed/offered   Walking  Doing exercises  Consider doing yoga  [email or web address]     Sarah Dooley Allison Alpers speech      Sary Sol is her pcp through [facility]     Cognitive/Driving - denies     Mood  -   Stress  Brother in eating program - Deb -  very low BMI- in and out Rastafari  Other stressor with family member with separation     Hallucinations/delusions  - not     Sleep  - has wild dreams;   Melatonin 5mg nightly which has helped reduced dream behaviors.      Sleep is better     Talked about sleep needs and has been having problems sleeping at night - insomnia; may have some RLS type of features - electric feeling - this can happen when trying t fall asleep >> than at 330am after falling asleep.     Bladder  - seen urology   stress urinary incontinence  Considering a procedure for this. Medication did not work.   Considering botox for her bladder, PTNS and interstim, Kegel, etc.      Will see urology after foot surgery  Rober     GI/Constipation/GERD   Nonformulary magnesium glycinate 100mg twice daily  Ondansetron zofran 4mg ODT - not using     ENDO -   Calcium Carbonate 600 vitamin D 400  Cholecalciferol vitamin D3 4000 units - see aboove  Fish oil-3 fatty acids 100mg - stopped  Glucosamine-chondroitin twice daily      Nodule found on thyroid and was ultrasound and had followup     Has sweating issues. - discussed that it is unlikely related to her thyroid which was checked back       Cardio/heart -       2023   Pat Name:     ANTONIA NEGRETE              Department:   [facility]   Patient ID:   [id number]                    Room:           Gender:       F                        Technician:     :          1955               Requested By: JONNATHAN STEEL MD   Order Number: [id number]                Reading MD:   GUANAKO Chery                                    Measurements   Intervals                              Axis            Rate:         80                       P:            64   MT:           173                      QRS:          -5   QRSD:         73                       T:            63   QT:           310                                      QTc:          358                                                                  "Interpretive Statements   SINUS RHYTHM WITH SINUS ARRHYTHMIA   POSSIBLE LEFT ATRIAL ENLARGEMENT [-0.1mV P WAVE IN V1/V2]   NONSPECIFIC T-WAVE ABNORMALITY   Compared to ECG 01/04/2021 16:15:00   T-wave abnormality now present   Electronically Signed On 2-1-2023 10:39:18 CST by GUANAKO Chery      Vision - glasses     Heme  -      Other:     Right bunion surgery 2021   Left bunion surgery Friday      Ibuprofen 200mg as needed     Meclizine ever other night - on wean   Rare meloxicam mobic      Tizanidine zanaflex 4mg 1/2 every other night. - not using     For musculoskeletal pain  Duloxetine cymbalta 60mg  Gabapentin neurontin 2 x 300mg 3/day     Bunion on the other foot being done this Friday     Dr. Yasmeen Moctezuma foot surgeon     Back surgery and recovery  Had withdrawal off narcotics     Stressful year     Still having dreams and they are better with melatonin 2 x 5mg     Pain is managed  Fluoxetine 20mg - consider wean  Duloxetine 60mg - if needed consider dose increase     October 2023 will go to [facility]  April 2024 will go to [facility]  May 2023 was in [place], [facility]     Stomach medication  Discussion about omeprazole (prilosec)  Discussion on pantoprazole, tums, etc.      Pharmacy ([facility]) consultation and medication management     Bowel frequency is every 2-3 days  Only taking a probiotic  Also taking magnesium product for cramping  Diet is relatively healthy - not taking prunes, etc.      Has incontinence and seeing urologist and trying things and \"nothing has worked\"  Has tried mirabegron myrbetriq and did not work - unclear as to dose and duration 6 weeks  Has not tried botox  Has not done PTNS  Urologist is [facility].   She has leakage with sudden movements  Has tried pelvic floor physical therapy which was interrupted and has not been back.   Denies urgency  Nocturia - seldom  Has some frequency 5/6 times per day  Has not been to see them  Urology for gyn urology for stress " urinary incontinence     Has has a slight worsening in her balance.   Walking 45 minutes daily -  once or twice daily  Doing upper body weights  Did big and loud in the past   This was re ordered     Her legs feel heavy at times  She has self adjusted her medications.   Still using 1 in the morning, 2 at 3pm and 1 in the evening      Her son in law in  [place] was recently diagnosed with Parkinson  Had additional discussion about options like antibody therapies, treadmill study  Encouraged genetic testing  Pdgeneration.      ECG today if possible as she is taking medications that can affect her QTc interval  Had an ECG 2/2023 prior to these medications.      ECG on review by me today was normal    Back surgery 2/8/2023 fusion  Had 2 rods put in   It was helpful but wondering if there is more she can do to remain healthy longer     She has leg symptoms at night   Has electrical impulses  She may have to get up and move her legs  This is 1130 pm  Lays down at 11 or 1115p  And she takes a dose 1030pm and not clear if helps her leg symptoms.  Consider taking the sinemet at 930 or 10pm and to take 1 and if not helping then 1.5 tabs and if needed 2 tabs then.      She is physically active - yoga like and weight bearing  She will try to resume yoga through [employer].   The physical activity is impacted by her urinary problems.      Bon Mercer is her son in law      Pd generation - free - stated she had this done and there was not a gene.   Impression/Plan: Parkinson's disease, not due to a mutation in SNCA, GBA, LRRK2, ivan, DJ1, or PINK1.   [email or web address]   [phone]  [email or web address]://www.parkinson.org/advancing-research/our-[email or web address]     Nadir palomino genetic counsellor   [email or web address]  250 dollars      Urinary issues persist  Recommend seeing urology  May consider seeing physical therapy again     She has not had significant constipation  She has some leakage if she is coughing  "or sneezing  If she walks faster she has leakage  She has urinary frequency by choice rather than urge  She denies significant nocturia - once per night.   She is using heavy pads  She had done physical therapy in the past and was not that helpful.      From before  Has incontinence and seeing urologist and trying things and \"nothing has worked\"  Has tried mirabegron myrbetriq and did not work - unclear as to dose and duration 6 weeks  Has not tried botox  Has not done PTNS  [facility]rologist is [facility].   She has leakage with sudden movements  Has tried pelvic floor physical therapy which was interrupted and has not been back.   Denies urgency  Nocturia - seldom  Has some frequency 5/6 times per day  Has not been to see them  Urology for gyn urology for stress urinary incontinence     [facility]     Medications     8am noon 3pm 10   Acetaminophen tylenol 500mg  prn         Calcium Carbonate 600 vitamin D 400   1       Carbidopa/levodopa 25/100 sinemet 1   2 1  & prn   Cholecalciferol vit D3 2000 50mcg below 1       Duloxetine cymbalta 60mg  1         Estradiol vaginal cream       Fluoxetine prozac 20mg off      Gabapentin neurontin 300mg 2   2 2   Glucosamine-chondroitin   2   1   Ibuprofen advil 200mg prn         Magnesium glycinate 100mg below         Melatonin  5mg       2   Meloxicam mobic 15mg prn      Nonformulary mag glycinate 100mg  Above 1   1     Probiotic  1         Tizanidine zanaflex 4mg prn      Vit D3 cholecalciferol 50mcg 2000    1                                                Plan:    Had a family reunion with 41 people yesterday -     Wondering about dose of magnesium -  100mg twice daily = 200mg/day  No blood level  Wondering about dose.     She has had incontinence for years and seen by Dr. Tolentino and will get a bladder sling procedure next week.   Had urine testing.   Had tried mirabegron and estrogen cream, etc.   It has been getting to be a problem with her quality of life.     Her " "voice is more gravely more of the time and is part of the parkinson    She occasionally takes extra sinemet but not real often    Has noticed a cramping at night - that wakes her up.   She works through it - it is intermittent. It is around 2 or 3 a  Going to bed at 11 or 1130p and fall asleep after a bit.     She is probably having wearing off related cramping.   She has not been on a long acting dopamine agonist  She has not been on long acting sinemet - Sinemet CR 50/200 or 25/100   She has not taking sinemet as needed.     Her cramping is often brought on in certain positions.     She has not pushed up on her dose. She may take her sinemet a bit later.     Protein for dinner  Grape nuts, english muffin or grapefruit  Leftovers at noon  Eating less sweets  Occasional dilly bar  Drinks carbonated water  A few glasses of wine per week.    Discussion about protein timing and sinemet.     Discussion about probiotics and constipation.     Pharmacy ([place]) consultation and medication management  Please call the scheduling number @ [phone] to set up an appointment with pharmacists Pat Perez, Aimee Brown, or Melody Shin.     Treating insomnia can be difficult because the medications we use can be harmful, especially in older adults. In addition, sleep medications do not tend to be very effective and should only be used short-term. Cognitive behavioral therapy (CBT) is the most effective treatment for long-term insomnia. The goal of CBT for insomnia is to address the underlying causes of the sleep problems, including your habits and how you think about sleep. You can do CBT with a trained psychologist, or from the comfort of your home by following an online program or workbook. Here are some great resources for at-home CBT:    1) 6-week online CBT course through [facility] for $40: Ketto/sleep  2) \"Say Belkys to Insomnia\" by Carlos Good, PhD at [employer]: " "6-week program  3) \"Overcoming Insomnia: A Cognitive-Behavioral Therapy Approach Workbook\" by Salinas Ibrahim and Louise Burrows  4) \"Quiet Your Mind and Get to Sleep: Solutions to Insomnia for Those with Depression, Anxiety or Chronic Pain ([place] Self-Help Workbook) by Louise Burrows and Bessy Huntama canal January April will go to [place] to see [facility]ia    Coding statement:   Medical Decision Making:  #  Chronic progressive medical conditions addressed  - see above --   Review and/or interpretation of unique test or documentation from a provider outside of neurology no   Independent historian provided additional details  no I  Prescription drug management and review of potential side effects and/or monitoring for side effects  -- see above ---  Health impacted by social determinants of health  no    I have reviewed the note as documented above.  This accurately captures the substance of my conversation with the patient and total time spent preparing for visit, executing visit and completing visit on the day of the visit:  30 minutes.  The portion of this total time included face to face time     The longitudinal plan of care for Ne Padilla was addressed during this visit. Due to the added complexity in care, I will continue to support Ne Padilla in the subsequent management of this condition(s) and with the ongoing continuity of care of this condition(s).      Damien Lujan MD     ______________________________________    Last visit date and details:             ______________________________________      Patient was asked about 14 Review of systems including changes in vision (dry eyes, double vision), hearing, heart, lungs, musculoskeletal, depression, anxiety, snoring, RBD, insomnia, urinary frequency, urinary urgency, constipation, swallowing problems, hematological, ID, allergies, skin problems: seborrhea, endocrinological: thyroid, diabetes, cholesterol; balance, weight changes, and " "other neurological problems and these were not significant at this time except for   Patient Active Problem List   Diagnosis    Family hx of ALS (amyotrophic lateral sclerosis)    Family history of parkinsonism    Family history of dementia    Family history of ulcerative colitis    BPPV (benign paroxysmal positional vertigo)    Dysplastic nevi    Intervertebral cervical disc disorder with myelopathy, cervical region    Parkinson's disease, unspecified whether dyskinesia present, unspecified whether manifestations fluctuate (H)    Abnormal reflex    Disease of spinal cord, unspecified (H)    Urinary incontinence    Lumbar stenosis with neurogenic claudication    Thyroid nodule    Stress incontinence          Allergies   Allergen Reactions    Other Environmental Allergy Other (See Comments)     \"Sneezing\"  Other reaction(s): Respiratory Distress  Animal Dander (sneezing)    Sweet Corn (Diagnostic)      Other reaction(s): Respiratory Distress  \"Sneezing to Sweet Corn\"    Ciprofloxacin Rash    Hydrocodone-Acetaminophen Other (See Comments)     nightmares    Morphine Other (See Comments)     Nightmares    Morphine And Codeine Other (See Comments)     Nightmares    Animal Dander Difficulty breathing     \"sneezing\"    No Clinical Screening - See Comments Difficulty breathing     \"sneezing to melon & sweet corn\"    Prochlorperazine Other (See Comments)     \" tongue goes in & out & my stomach contracts\"  \"Tongue goes in & out & my stomach contracts\"    Cephalexin Rash     Past Surgical History:   Procedure Laterality Date    APPENDECTOMY      BACK SURGERY  02/08/2023    lumbar fusion L2-5 with heide    FOOT SURGERY Right 01/2021    bunion surgery    FOOT SURGERY Left     will be done 10/2021 bunion    NECK SURGERY  2012    cervical laminectomy C56     Past Medical History:   Diagnosis Date    Family history of dementia 04/09/2020    Family history of tremor     great grandfather    Family history of ulcerative colitis " 04/09/2020    Family hx of ALS (amyotrophic lateral sclerosis) 04/09/2020     Social History     Socioeconomic History    Marital status:      Spouse name: Not on file    Number of children: Not on file    Years of education: Not on file    Highest education level: Not on file   Occupational History    Not on file   Tobacco Use    Smoking status: Never    Smokeless tobacco: Never   Substance and Sexual Activity    Alcohol use: Yes    Drug use: Not on file    Sexual activity: Not on file   Other Topics Concern    Not on file   Social History Narrative    Not on file     Social Determinants of Health     Financial Resource Strain: Not on file   Food Insecurity: Not on file   Transportation Needs: Not on file   Physical Activity: Not on file   Stress: Not on file   Social Connections: Not on file   Interpersonal Safety: Not on file   Housing Stability: Not on file       Drug and lactation database from the [place] National Library of Medicine:  http://toxnet.nlm.nih.gov/cgi-bin/sis/htmlgen?LACT      B/P: Data Unavailable, T: Data Unavailable, P: Data Unavailable, R: Data Unavailable 0 lbs 0 oz  There were no vitals taken for this visit., There is no height or weight on file to calculate BMI.  Medications and Vitals not listed above were documented in the cart and reviewed by me.     Current Outpatient Medications   Medication Sig Dispense Refill    ondansetron (ZOFRAN ODT) 4 MG ODT tab       tiZANidine (ZANAFLEX) 4 MG tablet       acetaminophen (TYLENOL) 500 MG tablet Take 2 tablets (1,000 mg) by mouth daily as needed for mild pain      calcium carbonate 600 mg-vitamin D 400 units (CALTRATE) 600-400 MG-UNIT per tablet Take 1 tablet by mouth 2 times daily 90 tablet 3    carbidopa-levodopa (SINEMET)  MG tablet 1 tablet at 8 am, 2 @ 3pm and 1 at 10 pm and 1 tab as needed. 360 tablet 11    DULoxetine (CYMBALTA) 60 MG capsule Take 60 mg by mouth every morning 90 capsule 3    estradiol (ESTRACE) 0.1 MG/GM  vaginal cream Place 2 g vaginally twice a week 42.5 g 11    gabapentin (NEURONTIN) 300 MG capsule Take 600 mg by mouth 3 times daily 540 capsule 3    Glucosamine-Chondroit-Vit C-Mn (GLUCOSAMINE CHONDROITIN 1500 COMPLEX) CAPS Take 1 capsule by mouth daily      ibuprofen (ADVIL/MOTRIN) 200 MG tablet Take 2-3 tablets (400-600 mg) by mouth every 4 hours as needed for mild pain      MAGNESIUM GLYCINATE PO Take 100 mg by mouth 2 times daily      meclizine (ANTIVERT) 25 MG tablet 1/2 to 1 tab of 25mg by mouth daily as needed (Patient not taking: Reported on 6/24/2024)      Melatonin 5 MG CHEW 2 x 5mg chew by mouth nightly at 10pm      meloxicam (MOBIC) 15 MG tablet Take 1 tablet (15 mg) by mouth daily as needed (Patient not taking: Reported on 6/24/2024)      Probiotic Product (PROBIOTIC DAILY PO) Take 1 capsule by mouth daily (Nature's B[employer] brand)      vitamin D3 (CHOLECALCIFEROL) 50 mcg (2000 units) tablet Take 1 tablet by mouth daily           Damien Lujan MD

## 2024-07-31 DIAGNOSIS — G20.C PARKINSONISM, UNSPECIFIED PARKINSONISM TYPE (H): ICD-10-CM

## 2024-08-01 RX ORDER — MELOXICAM 15 MG/1
15 TABLET ORAL DAILY PRN
COMMUNITY
Start: 2024-07-01

## 2024-08-01 RX ORDER — CARBIDOPA AND LEVODOPA 25; 100 MG/1; MG/1
TABLET ORAL
Qty: 360 TABLET | Refills: 11 | OUTPATIENT
Start: 2024-08-01

## 2024-08-01 NOTE — TELEPHONE ENCOUNTER
Called and spoke with [facility] pharmacy. Patient still has 6 months of refills remaining. They are going to work on filling it for patient to  later today. Will refuse refill request at this time.      MEKA MorrisN RN Care Coordinator  Neurology/Neurosurgery/PM&R/Pain Management

## 2024-08-05 ENCOUNTER — LAB (OUTPATIENT)
Dept: LAB | Facility: CLINIC | Age: 69
End: 2024-08-05
Payer: COMMERCIAL

## 2024-08-05 ENCOUNTER — OFFICE VISIT (OUTPATIENT)
Dept: NEUROLOGY | Facility: CLINIC | Age: 69
End: 2024-08-05
Payer: COMMERCIAL

## 2024-08-05 ENCOUNTER — MYC MEDICAL ADVICE (OUTPATIENT)
Dept: UROLOGY | Facility: CLINIC | Age: 69
End: 2024-08-05

## 2024-08-05 VITALS
HEART RATE: 67 BPM | HEIGHT: 62 IN | DIASTOLIC BLOOD PRESSURE: 59 MMHG | BODY MASS INDEX: 21.35 KG/M2 | SYSTOLIC BLOOD PRESSURE: 92 MMHG | WEIGHT: 116 LBS

## 2024-08-05 DIAGNOSIS — G20.C PARKINSONISM, UNSPECIFIED PARKINSONISM TYPE (H): Primary | ICD-10-CM

## 2024-08-05 DIAGNOSIS — N39.3 STRESS INCONTINENCE: Primary | ICD-10-CM

## 2024-08-05 DIAGNOSIS — R25.2 BILATERAL LEG CRAMPS: ICD-10-CM

## 2024-08-05 DIAGNOSIS — N39.3 STRESS INCONTINENCE: ICD-10-CM

## 2024-08-05 LAB
ALBUMIN UR-MCNC: NEGATIVE MG/DL
AMORPH CRY #/AREA URNS HPF: ABNORMAL /HPF
APPEARANCE UR: ABNORMAL
BACTERIA #/AREA URNS HPF: ABNORMAL /HPF
BILIRUB UR QL STRIP: NEGATIVE
COLOR UR AUTO: YELLOW
GLUCOSE UR STRIP-MCNC: NEGATIVE MG/DL
HGB UR QL STRIP: NEGATIVE
KETONES UR STRIP-MCNC: NEGATIVE MG/DL
LEUKOCYTE ESTERASE UR QL STRIP: ABNORMAL
NITRATE UR QL: POSITIVE
PH UR STRIP: 7.5 [PH] (ref 5–7)
RBC #/AREA URNS AUTO: ABNORMAL /HPF
SKIP: ABNORMAL
SP GR UR STRIP: 1.02 (ref 1–1.03)
SQUAMOUS #/AREA URNS AUTO: ABNORMAL /LPF
UROBILINOGEN UR STRIP-MCNC: NORMAL MG/DL
WBC #/AREA URNS AUTO: ABNORMAL /HPF

## 2024-08-05 PROCEDURE — 81001 URINALYSIS AUTO W/SCOPE: CPT

## 2024-08-05 PROCEDURE — 87086 URINE CULTURE/COLONY COUNT: CPT

## 2024-08-05 PROCEDURE — 99214 OFFICE O/P EST MOD 30 MIN: CPT | Performed by: PSYCHIATRY & NEUROLOGY

## 2024-08-05 PROCEDURE — 87186 SC STD MICRODIL/AGAR DIL: CPT

## 2024-08-05 PROCEDURE — G2211 COMPLEX E/M VISIT ADD ON: HCPCS | Performed by: PSYCHIATRY & NEUROLOGY

## 2024-08-05 RX ORDER — NITROFURANTOIN 25; 75 MG/1; MG/1
100 CAPSULE ORAL 2 TIMES DAILY
Qty: 10 CAPSULE | Refills: 0 | Status: SHIPPED | OUTPATIENT
Start: 2024-08-05 | End: 2024-08-10

## 2024-08-05 NOTE — PATIENT INSTRUCTIONS
Medications     8am noon 3pm 10   Acetaminophen tylenol 500mg  prn         Calcium Carbonate 600 vitamin D 400   1       Carbidopa/levodopa 25/100 sinemet 1   2 1  & prn   Cholecalciferol vit D3 2000 50mcg below 1       Duloxetine cymbalta 60mg  1         Estradiol vaginal cream       Fluoxetine prozac 20mg off      Gabapentin neurontin 300mg 2   2 2   Glucosamine-chondroitin   2   1   Ibuprofen advil 200mg prn         Magnesium glycinate 100mg below         Melatonin  5mg       2   Meloxicam mobic 15mg prn      Nonformulary mag glycinate 100mg  Above 1   1     Probiotic  1         Tizanidine zanaflex 4mg prn      Vit D3 cholecalciferol 50mcg 2000    1                                                Plan:    Had a family reunion with 41 people yesterday -     Wondering about dose of magnesium -  100mg twice daily = 200mg/day  No blood level  Wondering about dose.     She has had incontinence for years and seen by Dr. Tolentino and will get a bladder sling procedure next week.   Had urine testing.   Had tried mirabegron and estrogen cream, etc.   It has been getting to be a problem with her quality of life.     Her voice is more gravely more of the time and is part of the parkinson    She occasionally takes extra sinemet but not real often    Has noticed a cramping at night - that wakes her up.   She works through it - it is intermittent. It is around 2 or 3 a  Going to bed at 11 or 1130p and fall asleep after a bit.     She is probably having wearing off related cramping.   She has not been on a long acting dopamine agonist  She has not been on long acting sinemet - Sinemet CR 50/200 or 25/100   She has not taking sinemet as needed.     Her cramping is often brought on in certain positions.     She has not pushed up on her dose. She may take her sinemet a bit later.     Protein for dinner  Grape nuts, english muffin or grapefruit  Leftovers at noon  Eating less sweets  Occasional dilly bar  Drinks carbonated water  A  "few glasses of wine per week.    Discussion about protein timing and sinemet.     Discussion about probiotics and constipation.     Pharmacy ([facility] [place]) consultation and medication management  Please call the scheduling number @ [phone] to set up an appointment with pharmacists Pat Perez, Aimee Brown, or Melody Shin.     Treating insomnia can be difficult because the medications we use can be harmful, especially in older adults. In addition, sleep medications do not tend to be very effective and should only be used short-term. Cognitive behavioral therapy (CBT) is the most effective treatment for long-term insomnia. The goal of CBT for insomnia is to address the underlying causes of the sleep problems, including your habits and how you think about sleep. You can do CBT with a trained psychologist, or from the comfort of your home by following an online program or workbook. Here are some great resources for at-home CBT:    1) 6-week online CBT course through [facility] for $40: [employer]/sleep  2) \"Say Belkys to Insomnia\" by Carlos Good, PhD at [employer]: 6-week program  3) \"Overcoming Insomnia: A Cognitive-Behavioral Therapy Approach Workbook\" by Salinas Ibrahim and Louise Burrows  4) \"Quiet Your Mind and Get to Sleep: Solutions to Insomnia for Those with Depression, Anxiety or Chronic Pain ([employer] Self-Help Workbook) by Louise Burrows and Bessy Thacker      "

## 2024-08-05 NOTE — CONFIDENTIAL NOTE
Faye Tolentino MD  P [facility] [place]  Please treat UTI  Thank you    Received the 8/5/24 UA results and the above result note from Dr. Tolentino. UC in process. Will plan to treat with macrobid 100mg po BID for 5 days per protocol and watch for final UC results. My chart message sent to patient.    Bette Prado RN, BSN

## 2024-08-05 NOTE — LETTER
2024      Ne Padilla  [address]   [facility] [address]      Dear Colleague,    Thank you for referring your patient, Ne Padilla, to the [facility] [place]. Please see a copy of my visit note below.      Diagnosis/Summary/Recommendations:    PATIENT: Ne Padilla  68 year old female     : 1955    PARESH: Aug 5, 2024       MRN: [id number]  [address] [facility] [address]     [phone] (M)   [phone] (H)         lives in [place]  [phone] (M)  [phone] (H)  Mark spouse  [phone] home  [email or web address]  [id number]         [phone] - use this     7 grand kids - busy   On the shirley     Visit with Nadir Kan to review genetic testing related to Ulcerative colitis as he father had this and ALS  Her great grandfather had tremor and there are numerous family members with tremor, autoimmune conditions      She was not anemic 2021 - no  Iron stores done        Assessment:  (G20) Parkinson's disease (H)  (primary encounter diagnosis)  Carbidopa/levodopa 25/100 sinemet tab 3/day and 1 prn     (Z82.0) Family hx of ALS (amyotrophic lateral sclerosis)  - father Singh Biswas  1933  (Z82.0) Family history of tremor great grand father  (Z81.8) Family history of dementia father   (Z83.79) Family history of ulcerative colitis father  Mother has had tremor   Brother has a gi problem  Sister with rheumatoid arthritis  Retired.     PD GENERATION study  Did not have any of the 7 variants in her genetic study with Dr. Kuhn     Review of diagnosis    Parkinson     Avoidance of dopamine blockers   Not taking     Motor complication review   1 prn sinemet on days active     Review of Impulse control disorders   No      Review of surgical or medication options   yes     Gait/Balance/Falls   Had one fall last night when her foot feel asleep and got up and her foot collapsed and had partial fall     Exercise/Therapy  performed/offered   Walking  Doing exercises  Consider doing yoga  [email or web address]     Sarah Dooley Allison Alpers speech      Sary Sol is her pcp through [facility]     Cognitive/Driving - denies     Mood  -   Stress  Brother in eating program - Deb - very low BMI- in and out [employer]  Other stressor with family member with separation     Hallucinations/delusions  - not     Sleep  - has wild dreams;   Melatonin 5mg nightly which has helped reduced dream behaviors.      Sleep is better     Talked about sleep needs and has been having problems sleeping at night - insomnia; may have some RLS type of features - electric feeling - this can happen when trying t fall asleep >> than at 330am after falling asleep.     Bladder  - seen urology   stress urinary incontinence  Considering a procedure for this. Medication did not work.   Considering botox for her bladder, PTNS and interstim, Kegel, etc.      Will see urology after foot surgery  [place]     GI/Constipation/GERD   Nonformulary magnesium glycinate 100mg twice daily  Ondansetron zofran 4mg ODT - not using     ENDO -   Calcium Carbonate 600 vitamin D 400  Cholecalciferol vitamin D3 4000 units - see aboove  Fish oil-3 fatty acids 100mg - stopped  Glucosamine-chondroitin twice daily      Nodule found on thyroid and was ultrasound and had followup     Has sweating issues. - discussed that it is unlikely related to her thyroid which was checked back       Cardio/heart -       2023   Pat Name:     ANTONIA NEGRETE              Department:   [facility]   Patient ID:   [id number]                    Room:           Gender:       F                        Technician:     :          1955               Requested By: JONNATHAN STEEL MD   Order Number: [id number]                Sanya MD:   GUANAKO Chery                                    Measurements   Intervals                              Axis            Rate:         80                       P:         "    64   IN:           173                      QRS:          -5   QRSD:         73                       T:            63   QT:           310                                      QTc:          358                                                                 Interpretive Statements   SINUS RHYTHM WITH SINUS ARRHYTHMIA   POSSIBLE LEFT ATRIAL ENLARGEMENT [-0.1mV P WAVE IN V1/V2]   NONSPECIFIC T-WAVE ABNORMALITY   Compared to ECG 01/04/2021 16:15:00   T-wave abnormality now present   Electronically Signed On 2-1-2023 10:39:18 CST by GUANAKO Chery      Vision - glasses     Heme  -      Other:     Right bunion surgery 2021   Left bunion surgery Friday      Ibuprofen 200mg as needed     Meclizine ever other night - on wean   Rare meloxicam mobic      Tizanidine zanaflex 4mg 1/2 every other night. - not using     For musculoskeletal pain  Duloxetine cymbalta 60mg  Gabapentin neurontin 2 x 300mg 3/day     Bunion on the other foot being done this Friday     Dr. Yasmeen Moctezuma foot surgeon     Back surgery and recovery  Had withdrawal off narcotics     Stressful year     Still having dreams and they are better with melatonin 2 x 5mg     Pain is managed  Fluoxetine 20mg - consider wean  Duloxetine 60mg - if needed consider dose increase     October 2023 will go to [facility]  April 2024 will go to the [place]  May 2023 was in [place], [facility] and [facility]     Stomach medication  Discussion about omeprazole (prilosec)  Discussion on pantoprazole, tums, etc.      Pharmacy ([place]) consultation and medication management     Bowel frequency is every 2-3 days  Only taking a probiotic  Also taking magnesium product for cramping  Diet is relatively healthy - not taking prunes, etc.      Has incontinence and seeing urologist and trying things and \"nothing has worked\"  Has tried mirabegron myrbetriq and did not work - unclear as to dose and duration 6 weeks  Has not tried botox  Has not done PTNS  Urologist is [facility].   She " has leakage with sudden movements  Has tried pelvic floor physical therapy which was interrupted and has not been back.   Denies urgency  Nocturia - seldom  Has some frequency 5/6 times per day  Has not been to see them  Urology for gyn urology for stress urinary incontinence     Has has a slight worsening in her balance.   Walking 45 minutes daily -  once or twice daily  Doing upper body weights  Did big and loud in the past   This was re ordered     Her legs feel heavy at times  She has self adjusted her medications.   Still using 1 in the morning, 2 at 3pm and 1 in the evening      Her son in law in  [place] was recently diagnosed with Parkinson  Had additional discussion about options like antibody therapies, treadmill study  Encouraged genetic testing  Pdgeneration.      ECG today if possible as she is taking medications that can affect her QTc interval  Had an ECG 2/2023 prior to these medications.      ECG on review by me today was normal    Back surgery 2/8/2023 fusion  Had 2 rods put in   It was helpful but wondering if there is more she can do to remain healthy longer     She has leg symptoms at night   Has electrical impulses  She may have to get up and move her legs  This is 1130 pm  Lays down at 11 or 1115p  And she takes a dose 1030pm and not clear if helps her leg symptoms.  Consider taking the sinemet at 930 or 10pm and to take 1 and if not helping then 1.5 tabs and if needed 2 tabs then.      She is physically active - yoga like and weight bearing  She will try to resume yoga through [employer].   The physical activity is impacted by her urinary problems.      Bon Mercer is her son in law      Pd generation - free - stated she had this done and there was not a gene.   Impression/Plan: Parkinson's disease, not due to a mutation in SNCA, GBA, LRRK2, ivan, DJ1, or PINK1.   [email or web address]  "  [phone]  [email or web address]://www.parkinson.org/advancing-research/our-[email or web address]     Nadir palomino genetic counsellor   [email or web address]  250 dollars      Urinary issues persist  Recommend seeing urology  May consider seeing physical therapy again     She has not had significant constipation  She has some leakage if she is coughing or sneezing  If she walks faster she has leakage  She has urinary frequency by choice rather than urge  She denies significant nocturia - once per night.   She is using heavy pads  She had done physical therapy in the past and was not that helpful.      From before  Has incontinence and seeing urologist and trying things and \"nothing has worked\"  Has tried mirabegron myrbetriq and did not work - unclear as to dose and duration 6 weeks  Has not tried botox  Has not done PTNS  [facility]rologist is [facility].   She has leakage with sudden movements  Has tried pelvic floor physical therapy which was interrupted and has not been back.   Denies urgency  Nocturia - seldom  Has some frequency 5/6 times per day  Has not been to see them  Urology for gyn urology for stress urinary incontinence     [facility]     Medications     8am noon 3pm 10   Acetaminophen tylenol 500mg  prn         Calcium Carbonate 600 vitamin D 400   1       Carbidopa/levodopa 25/100 sinemet 1   2 1  & prn   Cholecalciferol vit D3 2000 50mcg below 1       Duloxetine cymbalta 60mg  1         Estradiol vaginal cream       Fluoxetine prozac 20mg off      Gabapentin neurontin 300mg 2   2 2   Glucosamine-chondroitin   2   1   Ibuprofen advil 200mg prn         Magnesium glycinate 100mg below         Melatonin  5mg       2   Meloxicam mobic 15mg prn      Nonformulary mag glycinate 100mg  Above 1   1     Probiotic  1         Tizanidine zanaflex 4mg prn      Vit D3 cholecalciferol 50mcg 2000    1                                                Plan:    Had a family reunion with 41 people yesterday -     Wondering about " dose of magnesium -  100mg twice daily = 200mg/day  No blood level  Wondering about dose.     She has had incontinence for years and seen by Dr. Tolentino and will get a bladder sling procedure next week.   Had urine testing.   Had tried mirabegron and estrogen cream, etc.   It has been getting to be a problem with her quality of life.     Her voice is more gravely more of the time and is part of the parkinson    She occasionally takes extra sinemet but not real often    Has noticed a cramping at night - that wakes her up.   She works through it - it is intermittent. It is around 2 or 3 a  Going to bed at 11 or 1130p and fall asleep after a bit.     She is probably having wearing off related cramping.   She has not been on a long acting dopamine agonist  She has not been on long acting sinemet - Sinemet CR 50/200 or 25/100   She has not taking sinemet as needed.     Her cramping is often brought on in certain positions.     She has not pushed up on her dose. She may take her sinemet a bit later.     Protein for dinner  Grape nuts, english muffin or grapefruit  Leftovers at noon  Eating less sweets  Occasional dilly bar  Drinks carbonated water  A few glasses of wine per week.    Discussion about protein timing and sinemet.     Discussion about probiotics and constipation.     Pharmacy ([facility]) consultation and medication management  Please call the scheduling number @ [phone] to set up an appointment with pharmacists Pat Perez, Aimee Brown, or Melody Shin.     Treating insomnia can be difficult because the medications we use can be harmful, especially in older adults. In addition, sleep medications do not tend to be very effective and should only be used short-term. Cognitive behavioral therapy (CBT) is the most effective treatment for long-term insomnia. The goal of CBT for insomnia is to address the underlying causes of the sleep problems, including your habits and how you think about sleep. You can do  "CBT with a trained psychologist, or from the comfort of your home by following an online program or workbook. Here are some great resources for at-home CBT:    1) 6-week online CBT course through [facility] for $40: [employer]/sleep  2) \"Say Belkys to Insomnia\" by Carlos Good, PhD at [employer]: 6-week program  3) \"Overcoming Insomnia: A Cognitive-Behavioral Therapy Approach Workbook\" by Salinas Ibrahim and Louise Burrows  4) \"Quiet Your Mind and Get to Sleep: Solutions to Insomnia for Those with Depression, Anxiety or Chronic Pain ([employer] Self-Help Workbook) by Louise Burrows and Bessy Huntama canal January April will go to [place] to see [facility]    Coding statement:   Medical Decision Making:  #  Chronic progressive medical conditions addressed  - see above --   Review and/or interpretation of unique test or documentation from a provider outside of neurology no   Independent historian provided additional details  no I  Prescription drug management and review of potential side effects and/or monitoring for side effects  -- see above ---  Health impacted by social determinants of health  no    I have reviewed the note as documented above.  This accurately captures the substance of my conversation with the patient and total time spent preparing for visit, executing visit and completing visit on the day of the visit:  30 minutes.  The portion of this total time included face to face time     The longitudinal plan of care for Ne Padilla was addressed during this visit. Due to the added complexity in care, I will continue to support Ne Padilla in the subsequent management of this condition(s) and with the ongoing continuity of care of this condition(s).      Damien Lujan MD     ______________________________________    Last visit date and details:             ______________________________________      Patient was asked about 14 Review of systems " "including changes in vision (dry eyes, double vision), hearing, heart, lungs, musculoskeletal, depression, anxiety, snoring, RBD, insomnia, urinary frequency, urinary urgency, constipation, swallowing problems, hematological, ID, allergies, skin problems: seborrhea, endocrinological: thyroid, diabetes, cholesterol; balance, weight changes, and other neurological problems and these were not significant at this time except for   Patient Active Problem List   Diagnosis     Family hx of ALS (amyotrophic lateral sclerosis)     Family history of parkinsonism     Family history of dementia     Family history of ulcerative colitis     BPPV (benign paroxysmal positional vertigo)     Dysplastic nevi     Intervertebral cervical disc disorder with myelopathy, cervical region     Parkinson's disease, unspecified whether dyskinesia present, unspecified whether manifestations fluctuate (H)     Abnormal reflex     Disease of spinal cord, unspecified (H)     Urinary incontinence     Lumbar stenosis with neurogenic claudication     Thyroid nodule     Stress incontinence          Allergies   Allergen Reactions     Other Environmental Allergy Other (See Comments)     \"Sneezing\"  Other reaction(s): Respiratory Distress  Animal Dander (sneezing)     Sweet Corn (Diagnostic)      Other reaction(s): Respiratory Distress  \"Sneezing to Sweet Corn\"     Ciprofloxacin Rash     Hydrocodone-Acetaminophen Other (See Comments)     nightmares     Morphine Other (See Comments)     Nightmares     Morphine And Codeine Other (See Comments)     Nightmares     Animal Dander Difficulty breathing     \"sneezing\"     No Clinical Screening - See Comments Difficulty breathing     \"sneezing to melon & sweet corn\"     Prochlorperazine Other (See Comments)     \" tongue goes in & out & my stomach contracts\"  \"Tongue goes in & out & my stomach contracts\"     Cephalexin Rash     Past Surgical History:   Procedure Laterality Date     APPENDECTOMY       BACK SURGERY  " 02/08/2023    lumbar fusion L2-5 with heide     FOOT SURGERY Right 01/2021    bunion surgery     FOOT SURGERY Left     will be done 10/2021 bunion     NECK SURGERY  2012    cervical laminectomy C56     Past Medical History:   Diagnosis Date     Family history of dementia 04/09/2020     Family history of tremor     great grandfather     Family history of ulcerative colitis 04/09/2020     Family hx of ALS (amyotrophic lateral sclerosis) 04/09/2020     Social History     Socioeconomic History     Marital status:      Spouse name: Not on file     Number of children: Not on file     Years of education: Not on file     Highest education level: Not on file   Occupational History     Not on file   Tobacco Use     Smoking status: Never     Smokeless tobacco: Never   Substance and Sexual Activity     Alcohol use: Yes     Drug use: Not on file     Sexual activity: Not on file   Other Topics Concern     Not on file   Social History Narrative     Not on file     Social Determinants of Health     Financial Resource Strain: Not on file   Food Insecurity: Not on file   Transportation Needs: Not on file   Physical Activity: Not on file   Stress: Not on file   Social Connections: Not on file   Interpersonal Safety: Not on file   Housing Stability: Not on file       Drug and lactation database from the [place] National Library of Medicine:  http://toxnet.nlm.nih.gov/cgi-bin/sis/htmlgen?LACT      B/P: Data Unavailable, T: Data Unavailable, P: Data Unavailable, R: Data Unavailable 0 lbs 0 oz  There were no vitals taken for this visit., There is no height or weight on file to calculate BMI.  Medications and Vitals not listed above were documented in the cart and reviewed by me.     Current Outpatient Medications   Medication Sig Dispense Refill     ondansetron (ZOFRAN ODT) 4 MG ODT tab        tiZANidine (ZANAFLEX) 4 MG tablet        acetaminophen (TYLENOL) 500 MG tablet Take 2 tablets (1,000 mg) by mouth daily as needed for  mild pain       calcium carbonate 600 mg-vitamin D 400 units (CALTRATE) 600-400 MG-UNIT per tablet Take 1 tablet by mouth 2 times daily 90 tablet 3     carbidopa-levodopa (SINEMET)  MG tablet 1 tablet at 8 am, 2 @ 3pm and 1 at 10 pm and 1 tab as needed. 360 tablet 11     DULoxetine (CYMBALTA) 60 MG capsule Take 60 mg by mouth every morning 90 capsule 3     estradiol (ESTRACE) 0.1 MG/GM vaginal cream Place 2 g vaginally twice a week 42.5 g 11     gabapentin (NEURONTIN) 300 MG capsule Take 600 mg by mouth 3 times daily 540 capsule 3     Glucosamine-Chondroit-Vit C-Mn (GLUCOSAMINE CHONDROITIN 1500 COMPLEX) CAPS Take 1 capsule by mouth daily       ibuprofen (ADVIL/MOTRIN) 200 MG tablet Take 2-3 tablets (400-600 mg) by mouth every 4 hours as needed for mild pain       MAGNESIUM GLYCINATE PO Take 100 mg by mouth 2 times daily       meclizine (ANTIVERT) 25 MG tablet 1/2 to 1 tab of 25mg by mouth daily as needed (Patient not taking: Reported on 6/24/2024)       Melatonin 5 MG CHEW 2 x 5mg chew by mouth nightly at 10pm       meloxicam (MOBIC) 15 MG tablet Take 1 tablet (15 mg) by mouth daily as needed (Patient not taking: Reported on 6/24/2024)       Probiotic Product (PROBIOTIC DAILY PO) Take 1 capsule by mouth daily ([employer] B[employer] brand)       vitamin D3 (CHOLECALCIFEROL) 50 mcg (2000 units) tablet Take 1 tablet by mouth daily           Damien Lujan MD      Again, thank you for allowing me to participate in the care of your patient.        Sincerely,        Damien Lujan MD   normal (ped)...

## 2024-08-05 NOTE — NURSING NOTE
"Ne Padilla's goals for this visit include:   Chief Complaint   Patient presents with    Parkinson    RECHECK      Parkinsonism// 6 month follow up       She requests these members of her care team be copied on today's visit information: yes    PCP: Sary Fuller    Referring Provider:  Sary Fuller PA-C  [address] [place] [id number]    BP 92/59 (BP Location: Right arm, Patient Position: Sitting, Cuff Size: Adult Regular)   Pulse 67   Ht 1.575 m (5' 2\")   Wt 52.6 kg (116 lb)   BMI 21.22 kg/m      Do you need any medication refills at today's visit? Yes  WALDO Capone, CMA ([facility])      "

## 2024-08-06 LAB — BACTERIA UR CULT: ABNORMAL

## 2024-08-07 NOTE — CONFIDENTIAL NOTE
Chart reviewed. 8/5/24 urine culture shows that nitrofurantoin is susceptible and appropriate to treat UTI. My chart message sent to patient.    Bette Prado RN, BSN

## 2024-08-08 ENCOUNTER — ANESTHESIA EVENT (OUTPATIENT)
Dept: SURGERY | Facility: AMBULATORY SURGERY CENTER | Age: 69
End: 2024-08-08
Payer: COMMERCIAL

## 2024-08-12 ENCOUNTER — ANESTHESIA (OUTPATIENT)
Dept: SURGERY | Facility: AMBULATORY SURGERY CENTER | Age: 69
End: 2024-08-12
Payer: COMMERCIAL

## 2024-08-12 ENCOUNTER — HOSPITAL ENCOUNTER (OUTPATIENT)
Facility: AMBULATORY SURGERY CENTER | Age: 69
Discharge: HOME | End: 2024-08-12
Attending: UROLOGY | Admitting: UROLOGY
Payer: COMMERCIAL

## 2024-08-12 VITALS
WEIGHT: 116 LBS | SYSTOLIC BLOOD PRESSURE: 106 MMHG | OXYGEN SATURATION: 100 % | BODY MASS INDEX: 21.22 KG/M2 | TEMPERATURE: 97 F | RESPIRATION RATE: 16 BRPM | DIASTOLIC BLOOD PRESSURE: 69 MMHG | HEART RATE: 74 BPM

## 2024-08-12 PROCEDURE — 57288 REPAIR BLADDER DEFECT: CPT | Performed by: NURSE ANESTHETIST, CERTIFIED REGISTERED

## 2024-08-12 PROCEDURE — 57288 REPAIR BLADDER DEFECT: CPT | Performed by: ANESTHESIOLOGY

## 2024-08-12 PROCEDURE — 57288 REPAIR BLADDER DEFECT: CPT | Performed by: UROLOGY

## 2024-08-12 PROCEDURE — 57288 REPAIR BLADDER DEFECT: CPT

## 2024-08-12 PROCEDURE — C1771 REP DEV, URINARY, W/SLING: HCPCS

## 2024-08-12 PROCEDURE — G8907 PT DOC NO EVENTS ON DISCHARG: HCPCS

## 2024-08-12 PROCEDURE — G8916 PT W IV AB GIVEN ON TIME: HCPCS

## 2024-08-12 DEVICE — SLING SOLYX TRANSVAGINAL MESH M0068507000: Type: IMPLANTABLE DEVICE | Site: VAGINA | Status: FUNCTIONAL

## 2024-08-12 RX ORDER — FENTANYL CITRATE 50 UG/ML
25 INJECTION, SOLUTION INTRAMUSCULAR; INTRAVENOUS EVERY 5 MIN PRN
Status: DISCONTINUED | OUTPATIENT
Start: 2024-08-12 | End: 2024-08-13 | Stop reason: HOSPADM

## 2024-08-12 RX ORDER — LIDOCAINE 40 MG/G
CREAM TOPICAL
Status: DISCONTINUED | OUTPATIENT
Start: 2024-08-12 | End: 2024-08-13 | Stop reason: HOSPADM

## 2024-08-12 RX ORDER — OXYCODONE HYDROCHLORIDE 5 MG/1
10 TABLET ORAL EVERY 4 HOURS PRN
Status: DISCONTINUED | OUTPATIENT
Start: 2024-08-12 | End: 2024-08-13 | Stop reason: HOSPADM

## 2024-08-12 RX ORDER — HYDROCODONE BITARTRATE AND ACETAMINOPHEN 5; 325 MG/1; MG/1
1 TABLET ORAL ONCE
Status: DISCONTINUED | OUTPATIENT
Start: 2024-08-12 | End: 2024-08-13 | Stop reason: HOSPADM

## 2024-08-12 RX ORDER — ACETAMINOPHEN 325 MG/1
975 TABLET ORAL ONCE
Status: COMPLETED | OUTPATIENT
Start: 2024-08-12 | End: 2024-08-12

## 2024-08-12 RX ORDER — FENTANYL CITRATE 50 UG/ML
INJECTION, SOLUTION INTRAMUSCULAR; INTRAVENOUS PRN
Status: DISCONTINUED | OUTPATIENT
Start: 2024-08-12 | End: 2024-08-12

## 2024-08-12 RX ORDER — DEXAMETHASONE SODIUM PHOSPHATE 4 MG/ML
4 INJECTION, SOLUTION INTRA-ARTICULAR; INTRALESIONAL; INTRAMUSCULAR; INTRAVENOUS; SOFT TISSUE
Status: DISCONTINUED | OUTPATIENT
Start: 2024-08-12 | End: 2024-08-13 | Stop reason: HOSPADM

## 2024-08-12 RX ORDER — ONDANSETRON 2 MG/ML
4 INJECTION INTRAMUSCULAR; INTRAVENOUS EVERY 30 MIN PRN
Status: DISCONTINUED | OUTPATIENT
Start: 2024-08-12 | End: 2024-08-13 | Stop reason: HOSPADM

## 2024-08-12 RX ORDER — LIDOCAINE HYDROCHLORIDE 20 MG/ML
INJECTION, SOLUTION INFILTRATION; PERINEURAL PRN
Status: DISCONTINUED | OUTPATIENT
Start: 2024-08-12 | End: 2024-08-12

## 2024-08-12 RX ORDER — FENTANYL CITRATE 50 UG/ML
25 INJECTION, SOLUTION INTRAMUSCULAR; INTRAVENOUS
Status: DISCONTINUED | OUTPATIENT
Start: 2024-08-12 | End: 2024-08-13 | Stop reason: HOSPADM

## 2024-08-12 RX ORDER — OXYCODONE HYDROCHLORIDE 5 MG/1
5 TABLET ORAL EVERY 4 HOURS PRN
Status: DISCONTINUED | OUTPATIENT
Start: 2024-08-12 | End: 2024-08-13 | Stop reason: HOSPADM

## 2024-08-12 RX ORDER — FENTANYL CITRATE 50 UG/ML
50 INJECTION, SOLUTION INTRAMUSCULAR; INTRAVENOUS EVERY 5 MIN PRN
Status: DISCONTINUED | OUTPATIENT
Start: 2024-08-12 | End: 2024-08-13 | Stop reason: HOSPADM

## 2024-08-12 RX ORDER — SODIUM CHLORIDE, SODIUM LACTATE, POTASSIUM CHLORIDE, CALCIUM CHLORIDE 600; 310; 30; 20 MG/100ML; MG/100ML; MG/100ML; MG/100ML
INJECTION, SOLUTION INTRAVENOUS CONTINUOUS
Status: DISCONTINUED | OUTPATIENT
Start: 2024-08-12 | End: 2024-08-13 | Stop reason: HOSPADM

## 2024-08-12 RX ORDER — NALOXONE HYDROCHLORIDE 0.4 MG/ML
0.1 INJECTION, SOLUTION INTRAMUSCULAR; INTRAVENOUS; SUBCUTANEOUS
Status: DISCONTINUED | OUTPATIENT
Start: 2024-08-12 | End: 2024-08-13 | Stop reason: HOSPADM

## 2024-08-12 RX ORDER — PROPOFOL 10 MG/ML
INJECTION, EMULSION INTRAVENOUS CONTINUOUS PRN
Status: DISCONTINUED | OUTPATIENT
Start: 2024-08-12 | End: 2024-08-12

## 2024-08-12 RX ORDER — ONDANSETRON 4 MG/1
4 TABLET, ORALLY DISINTEGRATING ORAL EVERY 30 MIN PRN
Status: DISCONTINUED | OUTPATIENT
Start: 2024-08-12 | End: 2024-08-13 | Stop reason: HOSPADM

## 2024-08-12 RX ORDER — HYDRALAZINE HYDROCHLORIDE 20 MG/ML
2.5-5 INJECTION INTRAMUSCULAR; INTRAVENOUS EVERY 10 MIN PRN
Status: DISCONTINUED | OUTPATIENT
Start: 2024-08-12 | End: 2024-08-13 | Stop reason: HOSPADM

## 2024-08-12 RX ORDER — CLINDAMYCIN PHOSPHATE 900 MG/50ML
900 INJECTION, SOLUTION INTRAVENOUS SEE ADMIN INSTRUCTIONS
Status: DISCONTINUED | OUTPATIENT
Start: 2024-08-12 | End: 2024-08-13 | Stop reason: HOSPADM

## 2024-08-12 RX ORDER — GENTAMICIN SULFATE 80 MG/100ML
INJECTION, SOLUTION INTRAVENOUS PRN
Status: DISCONTINUED | OUTPATIENT
Start: 2024-08-12 | End: 2024-08-12

## 2024-08-12 RX ORDER — BUPIVACAINE HYDROCHLORIDE AND EPINEPHRINE 2.5; 5 MG/ML; UG/ML
INJECTION, SOLUTION INFILTRATION; PERINEURAL PRN
Status: DISCONTINUED | OUTPATIENT
Start: 2024-08-12 | End: 2024-08-12 | Stop reason: HOSPADM

## 2024-08-12 RX ORDER — PROPOFOL 10 MG/ML
INJECTION, EMULSION INTRAVENOUS PRN
Status: DISCONTINUED | OUTPATIENT
Start: 2024-08-12 | End: 2024-08-12

## 2024-08-12 RX ORDER — CLINDAMYCIN PHOSPHATE 900 MG/50ML
900 INJECTION, SOLUTION INTRAVENOUS
Status: COMPLETED | OUTPATIENT
Start: 2024-08-12 | End: 2024-08-12

## 2024-08-12 RX ORDER — METOPROLOL TARTRATE 1 MG/ML
1-2 INJECTION, SOLUTION INTRAVENOUS EVERY 5 MIN PRN
Status: DISCONTINUED | OUTPATIENT
Start: 2024-08-12 | End: 2024-08-13 | Stop reason: HOSPADM

## 2024-08-12 RX ADMIN — FENTANYL CITRATE 25 MCG: 50 INJECTION, SOLUTION INTRAMUSCULAR; INTRAVENOUS at 12:10

## 2024-08-12 RX ADMIN — CLINDAMYCIN PHOSPHATE 900 MG: 900 INJECTION, SOLUTION INTRAVENOUS at 11:22

## 2024-08-12 RX ADMIN — LIDOCAINE HYDROCHLORIDE 20 MG: 20 INJECTION, SOLUTION INFILTRATION; PERINEURAL at 12:13

## 2024-08-12 RX ADMIN — GENTAMICIN SULFATE 80 MG: 80 INJECTION, SOLUTION INTRAVENOUS at 12:14

## 2024-08-12 RX ADMIN — ACETAMINOPHEN 975 MG: 325 TABLET ORAL at 11:14

## 2024-08-12 RX ADMIN — PROPOFOL 200 MCG/KG/MIN: 10 INJECTION, EMULSION INTRAVENOUS at 12:12

## 2024-08-12 RX ADMIN — SODIUM CHLORIDE, SODIUM LACTATE, POTASSIUM CHLORIDE, CALCIUM CHLORIDE: 600; 310; 30; 20 INJECTION, SOLUTION INTRAVENOUS at 12:08

## 2024-08-12 RX ADMIN — PROPOFOL 50 MG: 10 INJECTION, EMULSION INTRAVENOUS at 12:13

## 2024-08-12 NOTE — ANESTHESIA PREPROCEDURE EVALUATION
"Anesthesia Pre-Procedure Evaluation    Patient: Ne Padilla   MRN: [id number] : 1955        Procedure : Procedure(s):  CREATION, VAGINAL SLING, WITH CYSTOSCOPY (support the urethra with mesh and look in the bladder)          Past Medical History:   Diagnosis Date    Family history of dementia 2020    Family history of tremor     great grandfather    Family history of ulcerative colitis 2020    Family hx of ALS (amyotrophic lateral sclerosis) 2020      Past Surgical History:   Procedure Laterality Date    APPENDECTOMY      BACK SURGERY  2023    lumbar fusion L2-5 with hiede    FOOT SURGERY Right 2021    bunion surgery    FOOT SURGERY Left     will be done 10/2021 bunion    NECK SURGERY  2012    cervical laminectomy C56      Allergies   Allergen Reactions    Other Environmental Allergy Other (See Comments)     \"Sneezing\"  Other reaction(s): Respiratory Distress  Animal Dander (sneezing)    Sweet Corn (Diagnostic)      Other reaction(s): Respiratory Distress  \"Sneezing to Sweet Corn\"    Ciprofloxacin Rash    Hydrocodone-Acetaminophen Other (See Comments)     nightmares    Morphine Other (See Comments)     Nightmares    Morphine And Codeine Other (See Comments)     Nightmares    Animal Dander Difficulty breathing     \"sneezing\"    No Clinical Screening - See Comments Difficulty breathing     \"sneezing to melon & sweet corn\"    Prochlorperazine Other (See Comments)     \" tongue goes in & out & my stomach contracts\"  \"Tongue goes in & out & my stomach contracts\"    Cephalexin Rash      Social History     Tobacco Use    Smoking status: Never    Smokeless tobacco: Never   Substance Use Topics    Alcohol use: Yes      Wt Readings from Last 1 Encounters:   24 52.6 kg (116 lb)        Anesthesia Evaluation            ROS/MED HX  ENT/Pulmonary:       Neurologic: Comment: Vertigo    (+)                 Parkinson's disease,               Cardiovascular:       METS/Exercise Tolerance:   " "  Hematologic:       Musculoskeletal:       GI/Hepatic:       Renal/Genitourinary:       Endo:     (+)          thyroid problem,            Psychiatric/Substance Use:       Infectious Disease:       Malignancy:       Other:            Physical Exam    Airway        Mallampati: III     Mouth opening: < 3 cm    Respiratory Devices and Support         Dental       (+) Minor Abnormalities - some fillings, tiny chips      Cardiovascular   cardiovascular exam normal          Pulmonary   pulmonary exam normal                OUTSIDE LABS:  CBC: No results found for: \"WBC\", \"HGB\", \"HCT\", \"PLT\"  BMP: No results found for: \"NA\", \"POTASSIUM\", \"CHLORIDE\", \"CO2\", \"BUN\", \"CR\", \"GLC\"  COAGS: No results found for: \"PTT\", \"INR\", \"FIBR\"  POC: No results found for: \"BGM\", \"HCG\", \"HCGS\"  HEPATIC: No results found for: \"ALBUMIN\", \"PROTTOTAL\", \"ALT\", \"AST\", \"GGT\", \"ALKPHOS\", \"BILITOTAL\", \"BILIDIRECT\", \"JHONY\"  OTHER: No results found for: \"PH\", \"LACT\", \"A1C\", \"JOZEF\", \"PHOS\", \"MAG\", \"LIPASE\", \"AMYLASE\", \"TSH\", \"T4\", \"T3\", \"CRP\", \"SED\"    Anesthesia Plan    ASA Status:  2    NPO Status:  NPO Appropriate    Anesthesia Type: MAC.     - Reason for MAC: straight local not clinically adequate   Induction: Intravenous.   Maintenance: TIVA.        Consents    Anesthesia Plan(s) and associated risks, benefits, and realistic alternatives discussed. Questions answered and patient/representative(s) expressed understanding.     - Discussed: Risks, Benefits and Alternatives for BOTH SEDATION and the PROCEDURE were discussed     - Discussed with:  Patient            Postoperative Care    Pain management: IV analgesics, Oral pain medications, Multi-modal analgesia.   PONV prophylaxis: Ondansetron (or other 5HT-3), Dexamethasone or Solumedrol, Background Propofol Infusion     Comments:               Derrick Ro MD    I have reviewed the pertinent notes and labs in the chart from the past 30 days and (re)examined the patient.  Any updates or changes from " those notes are reflected in this note.

## 2024-08-12 NOTE — ANESTHESIA CARE TRANSFER NOTE
Patient: Ne Padilla    Procedure: Procedure(s):  CREATION, VAGINAL SLING, WITH CYSTOSCOPY (support the urethra with mesh and look in the bladder)       Diagnosis: Stress incontinence [N39.3]  Diagnosis Additional Information: No value filed.    Anesthesia Type:   MAC     Note:      Level of Consciousness: awake  Oxygen Supplementation: room air    Independent Airway: airway patency satisfactory and stable  Dentition: dentition unchanged  Vital Signs Stable: post-procedure vital signs reviewed and stable  Report to RN Given: handoff report given  Patient transferred to: Phase II    Handoff Report: Identifed the Patient, Identified the Reponsible Provider, Reviewed the pertinent medical history, Discussed the surgical course, Reviewed Intra-OP anesthesia mangement and issues during anesthesia, Set expectations for post-procedure period and Allowed opportunity for questions and acknowledgement of understanding      Vitals:  Vitals Value Taken Time   BP     Temp     Pulse     Resp     SpO2 92 % 08/12/24 1240   Vitals shown include unfiled device data.    Electronically Signed By: CASE Tyson CRNA  August 12, 2024  12:41 PM

## 2024-08-12 NOTE — OP NOTE
PREOPERATIVE DIAGNOSES: 1. Stress urinary incontinence. 2. Urethral hypermobility.  POSTOPERATIVE DIAGNOSES: Same  PROCEDURE PERFORMED: 1. Mid urethral sling ([employer] Solyx). 2. Cystoscopy.     ATTENDING PHYSICIAN: Faye Tolentino MD   RESIDENT SURGEON: none  ANESTHESIA: Monitored anesthesia care with 5 mL of 1% lidocaine with epinephrine and 10 mL Xylocaine jelly per urethra.   ESTIMATED BLOOD LOSS: 10 mL.   FINDINGS:  No mesh in the bladder  SPECIMENS:  None      HISTORY OF PRESENT ILLNESS: Ne Padilla is a 68 year old year-old woman with a history of stress urinary incontinence. Differing management options were discussed with the patient and she has elected to proceed with a mid urethral sling.    DESCRIPTION OF PROCEDURE: After informed consent was obtained, the patient was identified, marked and taken to the operating room in her usual state of health. After induction with monitored anesthesia care, she was positioned in modified lithotomy position. Pneumo boots were placed. A timeout was performed to ensure proper patient, procedure and positioning. The patient received 1 gram of Ancef prior to initiation of surgery. She was prepped with Betadine. Xylocaine jelly of 10 mL were inserted into the urethra and a 18-Malaysian Dumotn catheter was placed.     An Allis clamp was used to grasp the vaginal mucosa approximately 1 cm from the urethral meatus at the mid urethra. A 25-gauge needle, we injected 10 mL of 1% lidocaine with epinephrine to hydrodissect the tissues. We then made a 1-cm incision through the vaginal mucosa with a #15 blade. We then dissected this laterally out passed the fornices of the vagina in the pubocervical fascia, out laterally to the pelvic side wall, first on the right-hand side and then on the left hand side. We then placed the Solyx sling on the trocar and placed this first through the obturator membrane on the left side and then deployed it and then placed it on the right hand  side. The sling lay flat.  The sling was placed under appropriate tension.  We then placed a 20-Irish cystoscope through the urethra and into the bladder, noting bilateral ureteral orifices. The floor of the bladder was intact as well as the bladder neck and the lateral edges, no mesh was seen. The urethra was without tape. We then withdrew the cystoscope with a full bladder and pressed on the suprapubic region and did not note any incontinence from the urethral meatus. At this time the sling on the right side was deployed and  the wound was irrigated.  A running 2-0 Vicryl was then used to reapproximate the mucosa of the vagina. The patient was then returned to the supine position and transported to recovery in stable condition.     ASSESSMENT AND PLAN: Ne Padilla is a 68 year old year-old woman who underwent a midurethral sling for stress urinary incontinence. We will plan to follow up with her in 2 weeks in Urology Clinic. Obtain residual urine and evaluate for obstructive urinary symptoms.     Faye Tolentino MD

## 2024-08-12 NOTE — H&P
Reviewed H and P from 7/1/24 by Sary Fuller PA-C     Exam:  There were no vitals taken for this visit.  GENERAL: alert and no distress  EYES: Eyes grossly normal to inspection.  No discharge or erythema, or obvious scleral/conjunctival abnormalities.  RESP: No audible wheeze, cough, or visible cyanosis.  Lungs clear  CV:  RRR  SKIN: Visible skin clear. No significant rash, abnormal pigmentation or lesions.  NEURO: Cranial nerves grossly intact.  Mentation and speech appropriate for age.  PSYCH: Appropriate affect, tone, and pace of words    A/P:  67 y/o female with no change in her medical hx since previous H and P on 7/1/24.  Ok to proceed with surgery.  -proceed with surgery as scheduled.

## 2024-08-12 NOTE — DISCHARGE INSTRUCTIONS
You had 375 mg of Tylenol at 1115. You may repeat this after 5:15. Maximum amount of Tylenol/Acetaminophen in a 24 hour period is 4,000 mg.

## 2024-08-12 NOTE — ANESTHESIA POSTPROCEDURE EVALUATION
Patient: Ne Padilla    Procedure: Procedure(s):  CREATION, VAGINAL SLING, WITH CYSTOSCOPY (support the urethra with mesh and look in the bladder)       Anesthesia Type:  MAC    Note:  Disposition: Outpatient   Postop Pain Control: Uneventful            Sign Out: Well controlled pain   PONV: No   Neuro/Psych: Uneventful            Sign Out: Acceptable/Baseline neuro status   Airway/Respiratory: Uneventful            Sign Out: Acceptable/Baseline resp. status   CV/Hemodynamics: Uneventful            Sign Out: Acceptable CV status; No obvious hypovolemia; No obvious fluid overload   Other NRE: NONE   DID A NON-ROUTINE EVENT OCCUR?            Last vitals:  Vitals Value Taken Time   /69 08/12/24 1245   Temp 97  F (36.1  C) 08/12/24 1239   Pulse 74 08/12/24 1300   Resp 16 08/12/24 1315   SpO2 100 % 08/12/24 1315       Electronically Signed By: Derrick Ro MD  August 12, 2024  2:10 PM

## 2024-08-13 ENCOUNTER — CARE COORDINATION (OUTPATIENT)
Dept: UROLOGY | Facility: CLINIC | Age: 69
End: 2024-08-13
Payer: COMMERCIAL

## 2024-08-14 ENCOUNTER — VIRTUAL VISIT (OUTPATIENT)
Dept: NEUROLOGY | Facility: CLINIC | Age: 69
End: 2024-08-14
Attending: PSYCHIATRY & NEUROLOGY
Payer: COMMERCIAL

## 2024-08-14 DIAGNOSIS — R52 PAIN: ICD-10-CM

## 2024-08-14 DIAGNOSIS — Z78.9 TAKES DIETARY SUPPLEMENTS: ICD-10-CM

## 2024-08-14 DIAGNOSIS — G20.C PARKINSONISM, UNSPECIFIED PARKINSONISM TYPE (H): Primary | ICD-10-CM

## 2024-08-14 DIAGNOSIS — N39.3 SUI (STRESS URINARY INCONTINENCE, FEMALE): ICD-10-CM

## 2024-08-14 PROCEDURE — 99207 PR NO BILLABLE SERVICE THIS VISIT: CPT | Mod: 95 | Performed by: PHARMACIST

## 2024-08-14 RX ORDER — PHENOL 1.4 %
10 AEROSOL, SPRAY (ML) MUCOUS MEMBRANE AT BEDTIME
COMMUNITY

## 2024-08-14 NOTE — PROGRESS NOTES
"Medication Therapy Management (MTM) Encounter    ASSESSMENT:                            Medication Adherence/Access: No issues identified    Parkinson's Disease:    Stable      Pain:   Stable     Incontinence:  Recently had a urology procedure     Vitamins/Supplements:   Discussed she can increase magnesium up to 500 mg daily but to monitor for loose stools    PLAN:                            You can try taking up to 400-500 mg of magnesium daily. If this causes diarrhea or abdominal pain, you can reduce the dose back to 200-300 mg daily.   We will continue all other medications at this time.     Follow-up: 6 months or sooner if needed     SUBJECTIVE/OBJECTIVE:                          Ne Padilla is a 68 year old female seen for a follow-up visit.       Reason for visit: follow up on medications.    Allergies/ADRs: Reviewed in chart  Past Medical History: Reviewed in chart  Tobacco: She reports that she has never smoked. She has never used smokeless tobacco.  Alcohol: 1-3 beverages / week    Medication Adherence/Access: no issues reported    Parkinson's Disease:    - carbidopa-levodopa  mg, 1 tablet in the morning, 2 tablets in the afternoon, 1 tablet at night (11-11:30 pm)  - Melatonin 10 mg nightly  Patient reports that her Parkinson's disease symptoms are pretty well controlled; however, she has been having more leg cramps, especially at night. She has tried taking the last dose of carbidopa-levodopa about an hour later to help with this. Occasionally she takes an extra dose of carbidopa-levodopa if having a particularly busy day but this is not too often.  She notices that her mouth feels \"pasty\" lately.      Pain:   - Gabapentin 600 mg 3 times/day   - Tylenol, taking occasionally for back pain (about every few days), a little more this week due to having urology procedure   - ibuprofen as needed, once in a while not often  - meloxicam very rarely  - tizanidine- hasn't taken for a long time but keeps on " hand in case   Patient states she had tried reducing gabapentin and had leg pain so she would prefer staying on it at this time.     Incontinence:  - estradiol cream- planning to stay on this  Patient states she had a vaginal sling procedure earlier this week and is recovering well.      Vitamins/Supplements:   - calcium twice daily   - vitamin D3 daily  - glucosamine daily   - magnesium 240 mg daily - trying a new brand and wondering what dose she can take    - probiotic daily     Today's Vitals: There were no vitals taken for this visit.  ----------------    I spent 15 minutes with this patient today. All changes were made via collaborative practice agreement with Dr. Lujan. A copy of the visit note was provided to the patient's provider(s).    A summary of these recommendations was sent via Xtime.    Pat Perez, Pharm.D.  Medication Therapy Management Pharmacist  [facility]    Telemedicine Visit Details  Type of service:  Video Conference via Klooff  Start Time:  1:30 PM  End Time: 1:45 PM     Medication Therapy Recommendations  No medication therapy recommendations to display

## 2024-08-14 NOTE — Clinical Note
August 16, 2024       TO: Ne Padilla  [address]   [facility] [address]       DearMs.Randy,    We are writing to inform you of your test results.    {[facility] results letter list:[id number]}    No results found from the In Basket message.    ***

## 2024-08-14 NOTE — Clinical Note
"8/14/2024       RE: Ne Padilla  [address]   [place] [facility] [address]     Dear Colleague,    Thank you for referring your patient, Ne Padilla, to the [facility] [place] at [facility]. Please see a copy of my visit note below.    Medication Therapy Management (MTM) Encounter    ASSESSMENT:                            Medication Adherence/Access: No issues identified    Parkinson's Disease:    Stable      Pain:   Stable     Incontinence:  Recently had a urology procedure     Vitamins/Supplements:   Stable     PLAN:                            You can try taking up to 400-500 mg of magnesium daily. If this causes diarrhea or abdominal pain, you can reduce the dose back to 200-300 mg daily.     Follow-up: ***    SUBJECTIVE/OBJECTIVE:                          Ne Padilla is a 68 year old female seen for a follow-up visit.       Reason for visit: follow up on medications.    Allergies/ADRs: Reviewed in chart  Past Medical History: Reviewed in chart  Tobacco: She reports that she has never smoked. She has never used smokeless tobacco.  Alcohol: 1-3 beverages / week    Medication Adherence/Access: no issues reported    Parkinson's Disease:    - carbidopa-levodopa  mg, 1 tablet in the morning, 2 tablets in the afternoon, 1 tablet at night (11-11:30 pm)  - Melatonin 10 mg nightly  Patient reports that her Parkinson's disease symptoms are pretty well controlled; however, she has been having more leg cramps, especially at night. She has tried taking the last dose of carbidopa-levodopa about an hour later to help with this. Occasionally she takes an extra dose of carbidopa-levodopa if having a particularly busy day but this is not too often.  She notices that her mouth feels \"pasty\" lately.      Pain:   - Gabapentin 600 mg 3 times/day   - Tylenol, taking occasionally for back pain (about every few days), a little more this week due to " having urology procedure   - ibuprofen as needed, once in a while not often  - meloxicam very rarely  - tizanidine- hasn't taken for a long time but keeps on hand in case   Patient states she had tried reducing gabapentin and had leg pain so she would prefer staying on it at this time.     Incontinence:  - estradiol cream- planning to stay on this  Patient states she had a vaginal sling procedure earlier this week and is recovering well.      Vitamins/Supplements:   - calcium twice daily   - vitamin D3 daily  - glucosamine daily   - magnesium 240 mg daily - trying a new brand and wondering what dose she can take    - probiotic daily     Today's Vitals: There were no vitals taken for this visit.  ----------------    I spent 15 minutes with this patient today. All changes were made via collaborative practice agreement with Dr. Lujan. A copy of the visit note was provided to the patient's provider(s).    A summary of these recommendations was sent via [employer].    Pat Perez, Pharm.D.  Medication Therapy Management Pharmacist  [facility]    Telemedicine Visit Details  Type of service:  Video Conference via Shweeb  Start Time:  1:30 PM  End Time: 1:45 PM     Medication Therapy Recommendations  No medication therapy recommendations to display       Again, thank you for allowing me to participate in the care of your patient.      Sincerely,    Pat Perez [facility]

## 2024-08-16 NOTE — PATIENT INSTRUCTIONS
"Recommendations from today's MTM visit:                                                      You can try taking up to 400-500 mg of magnesium daily. If this causes diarrhea or abdominal pain, you can reduce the dose back to 200-300 mg daily.   We will continue all other medications at this time.     Follow-up: 6 months or sooner if needed     It was great speaking with you today.  I value your experience and would be very thankful for your time in providing feedback in our clinic survey. In the next few days, you may receive an email or text message from [employer] with a link to a survey related to your  clinical pharmacist.\"     To schedule another MTM appointment, please call the clinic directly or you may call the MTM scheduling line at [phone].    My Clinical Pharmacist's contact information:                                                      Please feel free to contact me with any questions or concerns you have.      Pat Perez, Pharm.D.  Medication Therapy Management Pharmacist  [facility]     "

## 2024-08-26 ENCOUNTER — OFFICE VISIT (OUTPATIENT)
Dept: UROLOGY | Facility: CLINIC | Age: 69
End: 2024-08-26
Payer: COMMERCIAL

## 2024-08-26 DIAGNOSIS — K59.00 CONSTIPATION, UNSPECIFIED CONSTIPATION TYPE: ICD-10-CM

## 2024-08-26 DIAGNOSIS — Z48.89 ENCOUNTER FOR POSTOPERATIVE CARE: Primary | ICD-10-CM

## 2024-08-26 PROCEDURE — 99024 POSTOP FOLLOW-UP VISIT: CPT | Performed by: PHYSICIAN ASSISTANT

## 2024-08-26 NOTE — PROGRESS NOTES
August 26, 2024    Post operative visit    Hx of ABHAY and dyspareunia. S/p TVT sling with Dr. Tolentino on 8/12/24. Hx is complicated by parkionsons. Has trialed PFPT and myrbetriq, which did not help in the past. Denies vaginal pain, bleeding. Had 1 episode of incontinence right after surgery but none since that time. Is pleased with the surgery. Voiding without issues. Reports her constipation has improved. Patient voices no other concerns at this time.     There were no vitals taken for this visit.  Patient is comfortable, in no distress, non-labored breathing.  Abdomen is soft, non-tender, non-distended.  Vulva unremarkable. Speculum exam obtained with sutures note suburethrally. No lesions, ulcers, bleeding, abnormal discharge, signs of separation along incision site, or pain to palpation on internal exam. Healing appropriately. Note stool in colon/rectum; no communication between colon/rectum and vagina.     PVR 0 mL by bladder scan    A/P: Patient is s/p TVT sing on 8/12/24    - PVR WNL  - continue post-op restrictions for 4 more weeks; went over post op restrictions of no heavy lifting more than 10lbs, no intercourse and no hot tubs/pools/lakes/bath tubs for 4 more weeks.  - keep follow-up appointment with Dr. Tolentino as planned.   - noted constipation on exam. Recommend fiber supplement daily and miralax prn.     Cindy Thakur PA-C  Urology    CC  Patient Care Team:  Sary Fuller PA-C as PCP - General (Physician Assistant)  Damien Lujan MD as MD (Neurology)  Damien Lujan MD as Assigned Neuroscience Provider  Pat Perez RPH as Pharmacist (Pharmacist)  Pat Perez RPH as Assigned MTM Pharmacist  Faye Tolentino MD as Assigned Surgical Provider  SELF, REFERRED

## 2024-08-26 NOTE — PATIENT INSTRUCTIONS
- continue post-op restrictions for 4 more weeks.  - keep follow-up appointment with Dr. Tolentino as planned.   - Recommend fiber supplement daily and miralax prn.

## 2024-08-26 NOTE — NURSING NOTE
Ne Padilla's goals for this visit include:   Chief Complaint   Patient presents with    post op     2 week post-op, DOS 8/12 CREATION, VAGINAL SLING, WITH CYSTOSCOPY       She requests these members of her care team be copied on today's visit information:       PCP: Sary Fuller    Referring Provider:  Referred Self, MD  No address on file    post void residual: 0 ml     Do you need any medication refills at today's visit?     Jennifer García LPN on 8/26/2024 at 9:15 AM

## 2024-09-07 ENCOUNTER — PRE VISIT (OUTPATIENT)
Dept: UROLOGY | Facility: CLINIC | Age: 69
End: 2024-09-07
Payer: COMMERCIAL

## 2024-09-07 NOTE — TELEPHONE ENCOUNTER
Reason for visit: follow up post op     Relevant information: creation vaginal sling 8/12/24, stress incontinence    Records/imaging/labs/orders: in epic    Pt called: No need for a call    At Rooming: virtual visit    Urban Galarza  9/7/2024  1:41 PM

## 2024-09-25 ENCOUNTER — VIRTUAL VISIT (OUTPATIENT)
Dept: UROLOGY | Facility: CLINIC | Age: 69
End: 2024-09-25
Payer: COMMERCIAL

## 2024-09-25 DIAGNOSIS — N95.8 GENITOURINARY SYNDROME OF MENOPAUSE: ICD-10-CM

## 2024-09-25 DIAGNOSIS — N39.3 STRESS INCONTINENCE: Primary | ICD-10-CM

## 2024-09-25 DIAGNOSIS — R39.15 URGENCY OF URINATION: ICD-10-CM

## 2024-09-25 PROCEDURE — 99024 POSTOP FOLLOW-UP VISIT: CPT | Mod: 95 | Performed by: UROLOGY

## 2024-09-25 ASSESSMENT — PAIN SCALES - GENERAL: PAINLEVEL: NO PAIN (0)

## 2024-09-25 NOTE — PROGRESS NOTES
Virtual Visit Details    Type of service:  Video Visit   Video Start Time: 10:09 AM  Video End Time:10:13 AM    Originating Location (pt. Location): Home    Distant Location (provider location):  Off-site  Platform used for Video Visit: Maggie

## 2024-09-25 NOTE — LETTER
9/25/2024       RE: Ne Padilla  [address]   [place] [address]     Dear Colleague,    Thank you for referring your patient, Ne Padilla, to the [facility]OLOGY [facility] [place] [facility]. Please see a copy of my visit note below.    Virtual Visit Details    Type of service:  Video Visit   Video Start Time: 10:09 AM  Video End Time:10:13 AM    Originating Location (pt. Location): Home    Distant Location (provider location):  Off-site  Platform used for Video Visit: Cobase    Reason for Visit: 6 week post op for A midurethral sling on 8/12/24.    Clinical Data: Ms. Ne Padilla  is a 68 year old year old   female with a history of the above.   She returns today for her post operative visit.  She states that she is doing well.  No stress incontinence, minimal urinary urgency and urge incontinence.  She is not having difficulty voiding.    A/P s/p midurethral sling doing well, she is very pleased, she continues to have some urgency but not much.  -continue the estrogen   -discontinue restrictions of lifting and sexual activity  -f/u prn      Thank you for allowing me to participate in the care of  Ms.Wendy ANGELA Padilla and I will keep you updated on her progress.    Faye Tolentino MD       Again, thank you for allowing me to participate in the care of your patient.      Sincerely,    Faye Tolentino MD

## 2024-09-25 NOTE — PROGRESS NOTES
Reason for Visit: 6 week post op for A midurethral sling on 8/12/24.    Clinical Data: Ms. Ne Padilla  is a 68 year old year old   female with a history of the above.   She returns today for her post operative visit.  She states that she is doing well.  No stress incontinence, minimal urinary urgency and urge incontinence.  She is not having difficulty voiding.    A/P s/p midurethral sling doing well, she is very pleased, she continues to have some urgency but not much.  -continue the estrogen   -discontinue restrictions of lifting and sexual activity  -f/u prn      Thank you for allowing me to participate in the care of  Ms.Wendy ANGELA Padilla and I will keep you updated on her progress.    Faye Tolentino MD

## 2024-09-25 NOTE — NURSING NOTE
Current patient location: [address] [place] [id number]    Is the patient currently in the state of [place]? YES    Visit mode:VIDEO    If the visit is dropped, the patient can be reconnected by: VIDEO VISIT: Send to e-mail at: [email or web address]    Will anyone else be joining the visit? NO  (If patient encounters technical issues they should call [phone]    How would you like to obtain your AVS? MyChart    Are changes needed to the allergy or medication list? No    Are refills needed on medications prescribed by this physician? NO    Rooming Documentation:  Questionnaire(s) not done per department protocol    Reason for visit: RECHECK Shelby Kocher VVF

## 2025-01-04 NOTE — PROGRESS NOTES
Diagnosis/Summary/Recommendations:    PATIENT: Ne Padilla  69 year old female     : 1955    PARESH: Feb 3, 2025       MRN: [id number]  [address]   [facility] [address]     [phone] (M)   [phone] (H)      lives in [place]  [phone] (M)  [phone] (H)  Mark spouse  [phone] home  [email or web address]  [id number]         [phone] - use this     7 grand kids - busy   On the [place]     Visit with Nadir Kan to review genetic testing related to Ulcerative colitis as he father had this and ALS  Her great grandfather had tremor and there are numerous family members with tremor, autoimmune conditions      She was not anemic 2021 - no  Iron stores done        Assessment:  (G20) Parkinson's disease (H)  (primary encounter diagnosis)  Carbidopa/levodopa 25/100 sinemet tab 3/day and 1 prn     (Z82.0) Family hx of ALS (amyotrophic lateral sclerosis)  - father Singh Biswas  1933  (Z82.0) Family history of tremor great grand father  (Z81.8) Family history of dementia father   (Z83.79) Family history of ulcerative colitis father  Mother has had tremor   Brother has a gi problem  Sister with rheumatoid arthritis  Retired.     PD GENERATION study  Did not have any of the 7 variants in her genetic study with Dr. Kuhn     Review of diagnosis    Parkinson     Avoidance of dopamine blockers   Not taking     Motor complication review   1 prn sinemet on days active     Review of Impulse control disorders   No      Review of surgical or medication options   yes     Gait/Balance/Falls   Had one fall last night when her foot feel asleep and got up and her foot collapsed and had partial fall     Exercise/Therapy performed/offered   Walking  Doing exercises  Consider doing yoga  [email or web address]     Sarah Dooley Allison Alpers speech      Sary Sol is her pcp through [facility]     Cognitive/Driving - denies     Mood  -   Stress  Brother in eating program - Deb -  very low BMI- in and out Shinto  Other stressor with family member with separation     Hallucinations/delusions  - not     Sleep  - has wild dreams;   Melatonin 5mg nightly which has helped reduced dream behaviors.      Sleep is better     Talked about sleep needs and has been having problems sleeping at night - insomnia; may have some RLS type of features - electric feeling - this can happen when trying t fall asleep >> than at 330am after falling asleep.     Bladder  - seen urology   stress urinary incontinence  Considering a procedure for this. Medication did not work.   Considering botox for her bladder, PTNS and interstim, Kegel, etc.      Will see urology after foot surgery  Rober     GI/Constipation/GERD   Nonformulary magnesium glycinate 100mg twice daily  Ondansetron zofran 4mg ODT - not using     ENDO -   Calcium Carbonate 600 vitamin D 400  Cholecalciferol vitamin D3 4000 units - see aboove  Fish oil-3 fatty acids 100mg - stopped  Glucosamine-chondroitin twice daily      Nodule found on thyroid and was ultrasound and had followup     Has sweating issues. - discussed that it is unlikely related to her thyroid which was checked back       Cardio/heart -       2023   Pat Name:     ANTONIA NEGRETE              Department:   [facility]   Patient ID:   [id number]                    Room:           Gender:       F                        Technician:     :          1955               Requested By: JONNATHAN STEEL MD   Order Number: [id number]                Reading MD:   GUANAKO Chery                                    Measurements   Intervals                              Axis            Rate:         80                       P:            64   NC:           173                      QRS:          -5   QRSD:         73                       T:            63   QT:           310                                      QTc:          358                                                                  "Interpretive Statements   SINUS RHYTHM WITH SINUS ARRHYTHMIA   POSSIBLE LEFT ATRIAL ENLARGEMENT [-0.1mV P WAVE IN V1/V2]   NONSPECIFIC T-WAVE ABNORMALITY   Compared to ECG 01/04/2021 16:15:00   T-wave abnormality now present   Electronically Signed On 2-1-2023 10:39:18 CST by GUANAKO Chery      Vision - glasses     Heme  -      Other:     Right bunion surgery 2021   Left bunion surgery Friday      Ibuprofen 200mg as needed     Meclizine ever other night - on wean   Rare meloxicam mobic      Tizanidine zanaflex 4mg 1/2 every other night. - not using     For musculoskeletal pain  Duloxetine cymbalta 60mg  Gabapentin neurontin 2 x 300mg 3/day     Bunion on the other foot being done this Friday     Dr. Yasmeen Moctezuma foot surgeon     Back surgery and recovery  Had withdrawal off narcotics     Stressful year     Still having dreams and they are better with melatonin 2 x 5mg     Pain is managed  Fluoxetine 20mg - consider wean  Duloxetine 60mg - if needed consider dose increase     October 2023 [facility] go to [facility]  April 2024 will go to [facility]  May 2023 was in [place], [facility] and [facility]     Stomach medication  Discussion about omeprazole (prilosec)  Discussion on pantoprazole, tums, etc.      Pharmacy ([facility]) consultation and medication management     Bowel frequency is every 2-3 days  Only taking a probiotic  Also taking magnesium product for cramping  Diet is relatively healthy - not taking prunes, etc.      Has incontinence and seeing urologist and trying things and \"nothing has worked\"  Has tried mirabegron myrbetriq and did not work - unclear as to dose and duration 6 weeks  Has not tried botox  Has not done PTNS  [facility]rologist is [facility].   She has leakage with sudden movements  Has tried pelvic floor physical therapy which was interrupted and has not been back.   Denies urgency  Nocturia - seldom  Has some frequency 5/6 times per day  Has not been to see them  Urology for gyn urology for stress " urinary incontinence     Has has a slight worsening in her balance.   Walking 45 minutes daily -  once or twice daily  Doing upper body weights  Did big and loud in the past   This was re ordered     Her legs feel heavy at times  She has self adjusted her medications.   Still using 1 in the morning, 2 at 3pm and 1 in the evening      Her son in law in  [place] was recently diagnosed with Parkinson  Had additional discussion about options like antibody therapies, treadmill study  Encouraged genetic testing  Pdgeneration.      ECG today if possible as she is taking medications that can affect her QTc interval  Had an ECG 2/2023 prior to these medications.      ECG on review by me today was normal     Back surgery 2/8/2023 fusion  Had 2 rods put in   It was helpful but wondering if there is more she can do to remain healthy longer     She has leg symptoms at night   Has electrical impulses  She may have to get up and move her legs  This is 1130 pm  Lays down at 11 or 1115p  And she takes a dose 1030pm and not clear if helps her leg symptoms.  Consider taking the sinemet at 930 or 10pm and to take 1 and if not helping then 1.5 tabs and if needed 2 tabs then.      She is physically active - yoga like and weight bearing  She will try to resume yoga through [employer].   The physical activity is impacted by her urinary problems.      Bon Mercer is her son in law      Pd generation - free - stated she had this done and there was not a gene.   Impression/Plan: Parkinson's disease, not due to a mutation in SNCA, GBA, LRRK2, ivan, DJ1, or PINK1.   [email or web address]   [phone]  [email or web address]     Nadir palomino genetic counsellor   [email or web address]  250 dollars      Urinary issues persist  Recommend seeing urology  May consider seeing physical therapy again     She has not had significant constipation  She has some leakage if she is coughing  "or sneezing  If she walks faster she has leakage  She has urinary frequency by choice rather than urge  She denies significant nocturia - once per night.   She is using heavy pads  She had done physical therapy in the past and was not that helpful.      From before  Has incontinence and seeing urologist and trying things and \"nothing has worked\"  Has tried mirabegron myrbetriq and did not work - unclear as to dose and duration 6 weeks  Has not tried botox  Has not done PTNS  [facility]rologist is [facility].   She has leakage with sudden movements  Has tried pelvic floor physical therapy which was interrupted and has not been back.   Denies urgency  Nocturia - seldom  Has some frequency 5/6 times per day  Has not been to see them  Urology for gyn urology for stress urinary incontinence     [facility]    Had a family reunion with 41 people yesterday -      Wondering about dose of magnesium -  100mg twice daily = 200mg/day  No blood level  Wondering about dose.      She has had incontinence for years and seen by Dr. Tolentino and will get a bladder sling procedure next week.   Had urine testing.   Had tried mirabegron and estrogen cream, etc.   It has been getting to be a problem with her quality of life.      Her voice is more gravely more of the time and is part of the parkinson     She occasionally takes extra sinemet but not real often     Has noticed a cramping at night - that wakes her up.   She works through it - it is intermittent. It is around 2 or 3 a  Going to bed at 11 or 1130p and fall asleep after a bit.      She is probably having wearing off related cramping.   She has not been on a long acting dopamine agonist  She has not been on long acting sinemet - Sinemet CR 50/200 or 25/100   She has not taking sinemet as needed.      Her cramping is often brought on in certain positions.      She has not pushed up on her dose. She may take her sinemet a bit later.      Protein for dinner  Grape nuts, english " "muffin or grapefruit  Leftovers at noon  Eating less sweets  Occasional dilly bar  Drinks carbonated water  A few glasses of wine per week.     Discussion about protein timing and sinemet.      Discussion about probiotics and constipation.      Pharmacy ([facility]) consultation and medication management    Treating insomnia can be difficult      [place]  will go to [place] to see grandson     Medications     8am noon 3pm 10   Acetaminophen tylenol 500mg  prn         Calcium Carbonate 600 vitamin D 400   1       Carbidopa/levodopa 25/100 sinemet 1   2 1  & prn   Cholecalciferol vit D3 2000 50mcg below 1       Duloxetine cymbalta 60mg  1         Estradiol vaginal cream - 2/wk          Fluoxetine prozac 20mg off         Gabapentin neurontin 300mg 2   2 2   Glucosamine-chondroitin   2   1   Ibuprofen advil 200mg prn         Magnesium glycinate 100mg below         Melatonin  10mg       1   Meloxicam mobic 15mg prn         Nonformulary mag glycinate 100mg  1 above        Probiotic  1         Tizanidine zanaflex 4mg prn         Vit D3 cholecalciferol 50mcg 2000                                               Plan:    Was on cruise and had norovirus and missed [place]  Daughters were checking on the house and pipes froze and were fixing this.   They are planning a trip with oldest grandson is in [place] and will go to [place] and then [place]     Remains on duloxetine cymbalta 60mg per day   Not taking fluoxetine    [place] trip planned.     Brother Hayder  from anorexia 89 lbs and was 5' 10\"    Mother is 92 and stressed about losing her son.     Daughter Regi  Daughter Ashely  Daughter Tori     Bladder sling placed 2024 and helped her urinary function.     PD GENERATION study  Did not have any of the 7 variants in her genetic study with Dr. Kuhn    She occasionally takes extra sinemet but not real often  Fairly stable with her parkinson but a bit weaker and needs help opening jars  Has " been using weights.   Rare cramping at night - that wakes her up.   She will get her magnesium  checked and is taking magnesium once per day   She works through it - it is intermittent. It is around 2 or 3 a  Going to bed at 11 or 1130p and fall asleep after a bit.   Refilled her sinemet   Return in 6 months.   No need for physical therapy at this time  Walking  Yoga  Chiropractor every other week  Massage every other week.   Physical therapy ordered.     Son in law has parkinson - lives in [place] and is  to [place].     Pharmacy ([facility]) consultation and medication management  Please call the scheduling number @ [phone] to set up an appointment with pharmacists Pat Perez, Aimee Brown, or Melody Shin.     Nonformulary mag glycinate 100mg  Norovirus resolved     Gabapentin neurontin 300mg 2 x 3 times per day and is taking 6/day  Meloxicam/mobic intermittently for back pain.    Melatonin 10mg at night     Calcium Carbonate 600 vitamin D 400  Cholecalciferol vit D3 2000 50mcg  Bone density showed osteopenia 8/17/2023   May consider getting another scan in 2025 to look for osteoporosis.     Blood work magnesium  Physical therapy   Return 6 months  Refilled sinemet   Bone density this year     Coding statement:   Medical Decision Making:  #  Chronic progressive medical conditions addressed  - see above --   Review and/or interpretation of unique test or documentation from a provider outside of neurology no   Independent historian provided additional details  no I  Prescription drug management and review of potential side effects and/or monitoring for side effects  -- see above ---  Health impacted by social determinants of health  no    I have reviewed the note as documented above.  This accurately captures the substance of my conversation with the patient and total time spent preparing for visit, executing visit and completing visit on the day of the visit:  30 minutes.  The portion of this total  "time included face to face time     The longitudinal plan of care for Ne Padilla was addressed during this visit. Due to the added complexity in care, I will continue to support eN Padilla in the subsequent management of this condition(s) and with the ongoing continuity of care of this condition(s).      Damien Lujan MD     ______________________________________    Last visit date and details:             ______________________________________      Patient was asked about 14 Review of systems including changes in vision (dry eyes, double vision), hearing, heart, lungs, musculoskeletal, depression, anxiety, snoring, RBD, insomnia, urinary frequency, urinary urgency, constipation, swallowing problems, hematological, ID, allergies, skin problems: seborrhea, endocrinological: thyroid, diabetes, cholesterol; balance, weight changes, and other neurological problems and these were not significant at this time except for   Patient Active Problem List   Diagnosis    Family hx of ALS (amyotrophic lateral sclerosis)    Family history of parkinsonism    Family history of dementia    Family history of ulcerative colitis    BPPV (benign paroxysmal positional vertigo)    Dysplastic nevi    Intervertebral cervical disc disorder with myelopathy, cervical region    Parkinson's disease, unspecified whether dyskinesia present, unspecified whether manifestations fluctuate (H)    Abnormal reflex    Disease of spinal cord, unspecified (H)    Urinary incontinence    Lumbar stenosis with neurogenic claudication    Thyroid nodule    Stress incontinence          Allergies   Allergen Reactions    Other Environmental Allergy Other (See Comments)     \"Sneezing\"  Other reaction(s): Respiratory Distress  Animal Dander (sneezing)    Sweet Corn (Diagnostic)      Other reaction(s): Respiratory Distress  \"Sneezing to Sweet Corn\"    Ciprofloxacin Rash    Hydrocodone-Acetaminophen Other (See Comments)     nightmares    Morphine Other (See Comments) " "    Nightmares    Morphine And Codeine Other (See Comments)     Nightmares    Animal Dander Difficulty breathing     \"sneezing\"    No Clinical Screening - See Comments Difficulty breathing     \"sneezing to melon & sweet corn\"    Prochlorperazine Other (See Comments)     \" tongue goes in & out & my stomach contracts\"  \"Tongue goes in & out & my stomach contracts\"    Cephalexin Rash     Past Surgical History:   Procedure Laterality Date    APPENDECTOMY      BACK SURGERY  02/08/2023    lumbar fusion L2-5 with heide    CYSTOSCOPY, SLING TRANSVAGINAL N/A 8/12/2024    Procedure: CREATION, VAGINAL SLING, WITH CYSTOSCOPY (support the urethra with mesh and look in the bladder);  Surgeon: Faye Tolention MD;  Location: [facility] OR    FOOT SURGERY Right 01/2021    bunion surgery    FOOT SURGERY Left     will be done 10/2021 bunion    NECK SURGERY  2012    cervical laminectomy C56     Past Medical History:   Diagnosis Date    Family history of dementia 04/09/2020    Family history of tremor     great grandfather    Family history of ulcerative colitis 04/09/2020    Family hx of ALS (amyotrophic lateral sclerosis) 04/09/2020     Social History     Socioeconomic History    Marital status:      Spouse name: Not on file    Number of children: Not on file    Years of education: Not on file    Highest education level: Not on file   Occupational History    Not on file   Tobacco Use    Smoking status: Never    Smokeless tobacco: Never   Substance and Sexual Activity    Alcohol use: Yes    Drug use: Not on file    Sexual activity: Not on file   Other Topics Concern    Not on file   Social History Narrative    Not on file     Social Drivers of Health     Financial Resource Strain: Not on file   Food Insecurity: Not on file   Transportation Needs: Not on file   Physical Activity: Not on file   Stress: Not on file   Social Connections: Not on file   Interpersonal Safety: Not on file   Housing Stability: Not on file       Drug and " lactation database from the [place] National Library of Medicine:  http://toxnet.nlm.nih.gov/cgi-bin/sis/htmlgen?LACT      B/P: Data Unavailable, T: Data Unavailable, P: Data Unavailable, R: Data Unavailable 0 lbs 0 oz  There were no vitals taken for this visit., There is no height or weight on file to calculate BMI.  Medications and Vitals not listed above were documented in the cart and reviewed by me.     Current Outpatient Medications   Medication Sig Dispense Refill    acetaminophen (TYLENOL) 500 MG tablet Take 2 tablets (1,000 mg) by mouth daily as needed for mild pain      calcium carbonate 600 mg-vitamin D 400 units (CALTRATE) 600-400 MG-UNIT per tablet Take 1 tablet by mouth 2 times daily 90 tablet 3    carbidopa-levodopa (SINEMET)  MG tablet 1 tablet at 8 am, 2 @ 3pm and 1 at 10 pm and 1 tab as needed. 360 tablet 11    DULoxetine (CYMBALTA) 60 MG capsule Take 60 mg by mouth every morning 90 capsule 3    estradiol (ESTRACE) 0.1 MG/GM vaginal cream Place 2 g vaginally twice a week 42.5 g 11    gabapentin (NEURONTIN) 300 MG capsule Take 600 mg by mouth 3 times daily 540 capsule 3    Glucosamine-Chondroit-Vit C-Mn (GLUCOSAMINE CHONDROITIN 1500 COMPLEX) CAPS Take 1 capsule by mouth daily      ibuprofen (ADVIL/MOTRIN) 200 MG tablet Take 2-3 tablets (400-600 mg) by mouth every 4 hours as needed for mild pain      MAGNESIUM GLYCINATE PO Take 100 mg by mouth 2 times daily      Melatonin 10 MG TABS tablet Take 10 mg by mouth at bedtime      meloxicam (MOBIC) 15 MG tablet Take 15 mg by mouth daily as needed      Probiotic Product (PROBIOTIC DAILY PO) Take 1 capsule by mouth daily ([employer] brand)      tiZANidine (ZANAFLEX) 4 MG tablet Take 4 mg by mouth daily as needed      vitamin D3 (CHOLECALCIFEROL) 50 mcg (2000 units) tablet Take 1 tablet by mouth daily           Damien Lujan MD

## 2025-02-03 ENCOUNTER — OFFICE VISIT (OUTPATIENT)
Dept: NEUROLOGY | Facility: CLINIC | Age: 70
End: 2025-02-03
Payer: COMMERCIAL

## 2025-02-03 VITALS
BODY MASS INDEX: 20.01 KG/M2 | SYSTOLIC BLOOD PRESSURE: 101 MMHG | WEIGHT: 109.4 LBS | DIASTOLIC BLOOD PRESSURE: 61 MMHG | HEART RATE: 73 BPM

## 2025-02-03 DIAGNOSIS — G20.C PARKINSONISM, UNSPECIFIED PARKINSONISM TYPE (H): Primary | ICD-10-CM

## 2025-02-03 DIAGNOSIS — E83.40 DISORDER OF MAGNESIUM METABOLISM: ICD-10-CM

## 2025-02-03 PROCEDURE — G2211 COMPLEX E/M VISIT ADD ON: HCPCS | Performed by: PSYCHIATRY & NEUROLOGY

## 2025-02-03 PROCEDURE — 99214 OFFICE O/P EST MOD 30 MIN: CPT | Performed by: PSYCHIATRY & NEUROLOGY

## 2025-02-03 RX ORDER — CARBIDOPA AND LEVODOPA 25; 100 MG/1; MG/1
TABLET ORAL
Qty: 450 TABLET | Refills: 11 | Status: SHIPPED | OUTPATIENT
Start: 2025-02-03

## 2025-02-03 RX ORDER — PHENOL 1.4 %
10 AEROSOL, SPRAY (ML) MUCOUS MEMBRANE AT BEDTIME
COMMUNITY
End: 2025-02-03

## 2025-02-03 NOTE — PATIENT INSTRUCTIONS
"  Medications     8am noon 3pm 10   Acetaminophen tylenol 500mg  prn         Calcium Carbonate 600 vitamin D 400   1       Carbidopa/levodopa 25/100 sinemet 1   2 1  & prn   Cholecalciferol vit D3 2000 50mcg below 1       Duloxetine cymbalta 60mg  1         Estradiol vaginal cream - 2/wk          Fluoxetine prozac 20mg off         Gabapentin neurontin 300mg 2   2 2   Glucosamine-chondroitin   2   1   Ibuprofen advil 200mg prn         Magnesium glycinate 100mg below         Melatonin  10mg       1   Meloxicam mobic 15mg prn         Nonformulary mag glycinate 100mg  1 above        Probiotic  1         Tizanidine zanaflex 4mg prn         Vit D3 cholecalciferol 50mcg 2000    1                                               Plan:    Was on cruise and had norovirus and missed [place]  Daughters were checking on the house and pipes froze and were fixing this.   They are planning a trip with oldest grandson is in [place] and will go to [place] then [place]     Remains on duloxetine cymbalta 60mg per day   Not taking fluoxetine    [facility] trip planned.     Brother Hayder  from anorexia 89 lbs and was 5' 10\"    Mother is 92 and stressed about losing her son.     Daughter Regi  Daughter Ashely  Daughter Tori     Bladder sling placed 2024 and helped her urinary function.     PD GENERATION study  Did not have any of the 7 variants in her genetic study with Dr. Kuhn    She occasionally takes extra sinemet but not real often  Fairly stable with her parkinson but a bit weaker and needs help opening jars  Has been using weights.   Rare cramping at night - that wakes her up.   She will get her magnesium  checked and is taking magnesium once per day   She works through it - it is intermittent. It is around 2 or 3 a  Going to bed at 11 or 1130p and fall asleep after a bit.   Refilled her sinemet   Return in 6 months.   No need for physical therapy at this time  Walking  Yoga  Chiropractor every other " week  Massage every other week.   Physical therapy ordered.     Pharmacy ([facility]) consultation and medication management  Please call the scheduling number @ [phone] to set up an appointment with pharmacists Pat Perez, Aimee Brown, or Melody Shin.     Nonformulary mag glycinate 100mg  Norovirus resolved     Gabapentin neurontin 300mg 2 x 3 times per day and is taking 6/day  Meloxicam/mobic intermittently for back pain.    Melatonin 10mg at night     Calcium Carbonate 600 vitamin D 400  Cholecalciferol vit D3 2000 50mcg  Bone density showed osteopenia 8/17/2023   May consider getting another scan in 2025 to look for osteoporosis.     Blood work magnesium  Physical therapy   Return 6 months  Refilled sinemet   Bone density this year

## 2025-02-03 NOTE — LETTER
2/3/2025      Ne Padilla  [address]   [facility] [address]      Dear Colleague,    Thank you for referring your patient, Ne Padilla, to the [facility] [place]. Please see a copy of my visit note below.        Diagnosis/Summary/Recommendations:    PATIENT: Ne Padilla  69 year old female     : 1955    PARESH: Feb 3, 2025       MRN: [id number]  [address] [facility] [address]     [phone] (M)   [phone] (H)      lives in [place]  [phone] (M)  [phone] (H)  Mark spouse  [phone] home  [email or web address]  [id number]         [phone] - use this     7 grand kids - busy   On the [place]     Visit with Nadir Kan to review genetic testing related to Ulcerative colitis as he father had this and ALS  Her great grandfather had tremor and there are numerous family members with tremor, autoimmune conditions      She was not anemic 2021 - no  [employer] stores done        Assessment:  (G20) Parkinson's disease (H)  (primary encounter diagnosis)  Carbidopa/levodopa 25/100 sinemet tab 3/day and 1 prn     (Z82.0) Family hx of ALS (amyotrophic lateral sclerosis)  - father Singh Biswas  1933  (Z82.0) Family history of tremor great grand father  (Z81.8) Family history of dementia father   (Z83.79) Family history of ulcerative colitis father  Mother has had tremor   Brother has a gi problem  Sister with rheumatoid arthritis  Retired.     PD GENERATION study  Did not have any of the 7 variants in her genetic study with Dr. Kuhn     Review of diagnosis    Parkinson     Avoidance of dopamine blockers   Not taking     Motor complication review   1 prn sinemet on days active     Review of Impulse control disorders   No      Review of surgical or medication options   yes     Gait/Balance/Falls   Had one fall last night when her foot feel asleep and got up and her foot collapsed and had partial fall     Exercise/Therapy performed/offered    Walking  Doing exercises  Consider doing yoga  [email or web address]     Sarah Dooley Allison Alpers speech      Sary Slo is her pcp through [facility]     Cognitive/Driving - denies     Mood  -   Stress  Brother in eating program - Deb - very low BMI- in and out Worship  Other stressor with family member with separation     Hallucinations/delusions  - not     Sleep  - has wild dreams;   Melatonin 5mg nightly which has helped reduced dream behaviors.      Sleep is better     Talked about sleep needs and has been having problems sleeping at night - insomnia; may have some RLS type of features - electric feeling - this can happen when trying t fall asleep >> than at 330am after falling asleep.     Bladder  - seen urology   stress urinary incontinence  Considering a procedure for this. Medication did not work.   Considering botox for her bladder, PTNS and interstim, Kegel, etc.      Will see urology after foot surgery  [place]     GI/Constipation/GERD   Nonformulary magnesium glycinate 100mg twice daily  Ondansetron zofran 4mg ODT - not using     ENDO -   Calcium Carbonate 600 vitamin D 400  Cholecalciferol vitamin D3 4000 units - see aboove  Fish oil-3 fatty acids 100mg - stopped  Glucosamine-chondroitin twice daily      Nodule found on thyroid and was ultrasound and had followup     Has sweating issues. - discussed that it is unlikely related to her thyroid which was checked back       Cardio/heart -       2023   Pat Name:     ANTONIA NEGRETE              [facility]   Patient ID:   [id number]                    Room:           Gender:       F                        Technician:     :          [id number]               Requested By: JONNATHAN STEEL MD   Order Number: [id number]                Reading MD:   GUANAKO Chery                                    Measurements   Intervals                              Axis            Rate:         80                       P:            64   WY:       "     173                      QRS:          -5   QRSD:         73                       T:            63   QT:           310                                      QTc:          358                                                                 Interpretive Statements   SINUS RHYTHM WITH SINUS ARRHYTHMIA   POSSIBLE LEFT ATRIAL ENLARGEMENT [-0.1mV P WAVE IN V1/V2]   NONSPECIFIC T-WAVE ABNORMALITY   Compared to ECG 01/04/2021 16:15:00   T-wave abnormality now present   Electronically Signed On 2-1-2023 10:39:18 CST by GUANAKO Chery      Vision - glasses     Heme  -      Other:     Right bunion surgery 2021   Left bunion surgery Friday      Ibuprofen 200mg as needed     Meclizine ever other night - on wean   Rare meloxicam mobic      Tizanidine zanaflex 4mg 1/2 every other night. - not using     For musculoskeletal pain  Duloxetine cymbalta 60mg  Gabapentin neurontin 2 x 300mg 3/day     Bunion on the other foot being done this Friday     Dr. Yasmeen Moctezuma foot surgeon     Back surgery and recovery  Had withdrawal off narcotics     Stressful year     Still having dreams and they are better with melatonin 2 x 5mg     Pain is managed  Fluoxetine 20mg - consider wean  Duloxetine 60mg - if needed consider dose increase     October 2023 will go to [facility]  April 2024 will go to the [employer]  May 2023 was in [place], [facility] and [facility]     Stomach medication  Discussion about omeprazole (prilosec)  Discussion on pantoprazole, tums, etc.      Pharmacy ([facility]) consultation and medication management     Bowel frequency is every 2-3 days  Only taking a probiotic  Also taking magnesium product for cramping  Diet is relatively healthy - not taking prunes, etc.      Has incontinence and seeing urologist and trying things and \"nothing has worked\"  Has tried mirabegron myrbetriq and did not work - unclear as to dose and duration 6 weeks  Has not tried botox  Has not done PTNS  Urologist is [facility].   She has leakage with " sudden movements  Has tried pelvic floor physical therapy which was interrupted and has not been back.   Denies urgency  Nocturia - seldom  Has some frequency 5/6 times per day  Has not been to see them  Urology for gyn urology for stress urinary incontinence     Has has a slight worsening in her balance.   Walking 45 minutes daily -  once or twice daily  Doing upper body weights  Did big and loud in the past   This was re ordered     Her legs feel heavy at times  She has self adjusted her medications.   Still using 1 in the morning, 2 at 3pm and 1 in the evening      Her son in law in  [place] was recently diagnosed with Parkinson  Had additional discussion about options like antibody therapies, treadmill study  Encouraged genetic testing  Pdgeneration.      ECG today if possible as she is taking medications that can affect her QTc interval  Had an ECG 2/2023 prior to these medications.      ECG on review by me today was normal     Back surgery 2/8/2023 fusion  Had 2 rods put in   It was helpful but wondering if there is more she can do to remain healthy longer     She has leg symptoms at night   Has electrical impulses  She may have to get up and move her legs  This is 1130 pm  Lays down at 11 or 1115p  And she takes a dose 1030pm and not clear if helps her leg symptoms.  Consider taking the sinemet at 930 or 10pm and to take 1 and if not helping then 1.5 tabs and if needed 2 tabs then.      She is physically active - yoga like and weight bearing  She will try to resume yoga through [employer].   The physical activity is impacted by her urinary problems.      Bon Mercer is her son in law      Pd generation - free - stated she had this done and there was not a gene.   Impression/Plan: Parkinson's disease, not due to a mutation in SNCA, GBA, LRRK2, iavn, DJ1, or PINK1.   [email or web address]   [phone]  [email or web address]     Nadir palomino  "genetic counsellor   [email or web address]  250 dollars      Urinary issues persist  Recommend seeing urology  May consider seeing physical therapy again     She has not had significant constipation  She has some leakage if she is coughing or sneezing  If she walks faster she has leakage  She has urinary frequency by choice rather than urge  She denies significant nocturia - once per night.   She is using heavy pads  She had done physical therapy in the past and was not that helpful.      From before  Has incontinence and seeing urologist and trying things and \"nothing has worked\"  Has tried mirabegron myrbetriq and did not work - unclear as to dose and duration 6 weeks  Has not tried botox  Has not done PTNS  [facility]rologist is [facility].   She has leakage with sudden movements  Has tried pelvic floor physical therapy which was interrupted and has not been back.   Denies urgency  Nocturia - seldom  Has some frequency 5/6 times per day  Has not been to see them  Urology for gyn urology for stress urinary incontinence     [facility]    Had a family reunion with 41 people yesterday -      Wondering about dose of magnesium -  100mg twice daily = 200mg/day  No blood level  Wondering about dose.      She has had incontinence for years and seen by Dr. Tolentino and will get a bladder sling procedure next week.   Had urine testing.   Had tried mirabegron and estrogen cream, etc.   It has been getting to be a problem with her quality of life.      Her voice is more gravely more of the time and is part of the parkinson     She occasionally takes extra sinemet but not real often     Has noticed a cramping at night - that wakes her up.   She works through it - it is intermittent. It is around 2 or 3 a  Going to bed at 11 or 1130p and fall asleep after a bit.      She is probably having wearing off related cramping.   She has not been on a long acting dopamine agonist  She has not been on long acting sinemet - Sinemet CR " "50/200 or 25/100   She has not taking sinemet as needed.      Her cramping is often brought on in certain positions.      She has not pushed up on her dose. She may take her sinemet a bit later.      Protein for dinner  Grape nuts, english muffin or grapefruit  Leftovers at noon  Eating less sweets  Occasional dilly bar  Drinks carbonated water  A few glasses of wine per week.     Discussion about protein timing and sinemet.      Discussion about probiotics and constipation.      Pharmacy ([facility]) consultation and medication management    Treating insomnia can be difficult      [place]  will go to [place] to see grandson     Medications     8am noon 3pm 10   Acetaminophen tylenol 500mg  prn         Calcium Carbonate 600 vitamin D 400   1       Carbidopa/levodopa 25/100 sinemet 1   2 1  & prn   Cholecalciferol vit D3 2000 50mcg below 1       Duloxetine cymbalta 60mg  1         Estradiol vaginal cream - 2/wk          Fluoxetine prozac 20mg off         Gabapentin neurontin 300mg 2   2 2   Glucosamine-chondroitin   2   1   Ibuprofen advil 200mg prn         Magnesium glycinate 100mg below         Melatonin  10mg       1   Meloxicam mobic 15mg prn         Nonformulary mag glycinate 100mg  1 above        Probiotic  1         Tizanidine zanaflex 4mg prn         Vit D3 cholecalciferol 50mcg     1                                               Plan:    Was on cruise and had norovirus and missed [place]  Daughters were checking on the house and pipes froze and were fixing this.   They are planning a trip with oldest grandson is in [place] and will go to [place] and then [place]     Remains on duloxetine cymbalta 60mg per day   Not taking fluoxetine    [place] trip planned.     Brother Hayder  from anorexia 89 lbs and was 5' 10\"    Mother is 92 and stressed about losing her son.     Daughter Regi  Daughter Ashely  Daughter Tori     Bladder sling placed 2024 and helped her urinary " function.     PD GENERATION study  Did not have any of the 7 variants in her genetic study with Dr. Kuhn    She occasionally takes extra sinemet but not real often  Fairly stable with her parkinson but a bit weaker and needs help opening jars  Has been using weights.   Rare cramping at night - that wakes her up.   She will get her magnesium  checked and is taking magnesium once per day   She works through it - it is intermittent. It is around 2 or 3 a  Going to bed at 11 or 1130p and fall asleep after a bit.   Refilled her sinemet   Return in 6 months.   No need for physical therapy at this time  Walking  Yoga  Chiropractor every other week  Massage every other week.   Physical therapy ordered.     Son in law has parkinson - lives in [place] and is  to [place].     Pharmacy ([facility]) consultation and medication management  Please call the scheduling number @ [phone] to set up an appointment with pharmacists Pat Perez, Aimee Brown, or Melody Shin.     Nonformulary mag glycinate 100mg  Norovirus resolved     Gabapentin neurontin 300mg 2 x 3 times per day and is taking 6/day  Meloxicam/mobic intermittently for back pain.    Melatonin 10mg at night     Calcium Carbonate 600 vitamin D 400  Cholecalciferol vit D3 2000 50mcg  Bone density showed osteopenia 8/17/2023   May consider getting another scan in 2025 to look for osteoporosis.     Blood work magnesium  Physical therapy   Return 6 months  Refilled sinemet   Bone density this year     Coding statement:   Medical Decision Making:  #  Chronic progressive medical conditions addressed  - see above --   Review and/or interpretation of unique test or documentation from a provider outside of neurology no   Independent historian provided additional details  no I  Prescription drug management and review of potential side effects and/or monitoring for side effects  -- see above ---  Health impacted by social determinants of health  no    I have reviewed  "the note as documented above.  This accurately captures the substance of my conversation with the patient and total time spent preparing for visit, executing visit and completing visit on the day of the visit:  30 minutes.  The portion of this total time included face to face time     The longitudinal plan of care for Ne Padilla was addressed during this visit. Due to the added complexity in care, I will continue to support Ne Padilla in the subsequent management of this condition(s) and with the ongoing continuity of care of this condition(s).      Damien Lujan MD     ______________________________________    Last visit date and details:             ______________________________________      Patient was asked about 14 Review of systems including changes in vision (dry eyes, double vision), hearing, heart, lungs, musculoskeletal, depression, anxiety, snoring, RBD, insomnia, urinary frequency, urinary urgency, constipation, swallowing problems, hematological, ID, allergies, skin problems: seborrhea, endocrinological: thyroid, diabetes, cholesterol; balance, weight changes, and other neurological problems and these were not significant at this time except for   Patient Active Problem List   Diagnosis     Family hx of ALS (amyotrophic lateral sclerosis)     Family history of parkinsonism     Family history of dementia     Family history of ulcerative colitis     BPPV (benign paroxysmal positional vertigo)     Dysplastic nevi     Intervertebral cervical disc disorder with myelopathy, cervical region     Parkinson's disease, unspecified whether dyskinesia present, unspecified whether manifestations fluctuate (H)     Abnormal reflex     Disease of spinal cord, unspecified (H)     Urinary incontinence     Lumbar stenosis with neurogenic claudication     Thyroid nodule     Stress incontinence          Allergies   Allergen Reactions     Other Environmental Allergy Other (See Comments)     \"Sneezing\"  Other reaction(s): " "Respiratory Distress  Animal Dander (sneezing)     Sweet Corn (Diagnostic)      Other reaction(s): Respiratory Distress  \"Sneezing to Sweet Corn\"     Ciprofloxacin Rash     Hydrocodone-Acetaminophen Other (See Comments)     nightmares     Morphine Other (See Comments)     Nightmares     Morphine And Codeine Other (See Comments)     Nightmares     Animal Dander Difficulty breathing     \"sneezing\"     No Clinical Screening - See Comments Difficulty breathing     \"sneezing to melon & sweet corn\"     Prochlorperazine Other (See Comments)     \" tongue goes in & out & my stomach contracts\"  \"Tongue goes in & out & my stomach contracts\"     Cephalexin Rash     Past Surgical History:   Procedure Laterality Date     APPENDECTOMY       BACK SURGERY  02/08/2023    lumbar fusion L2-5 with heide     CYSTOSCOPY, SLING TRANSVAGINAL N/A 8/12/2024    Procedure: CREATION, VAGINAL SLING, WITH CYSTOSCOPY (support the urethra with mesh and look in the bladder);  Surgeon: Faye Tolentino MD;  Location: [facility] OR     FOOT SURGERY Right 01/2021    bunion surgery     FOOT SURGERY Left     will be done 10/2021 bunion     NECK SURGERY  2012    cervical laminectomy C56     Past Medical History:   Diagnosis Date     Family history of dementia 04/09/2020     Family history of tremor     great grandfather     Family history of ulcerative colitis 04/09/2020     Family hx of ALS (amyotrophic lateral sclerosis) 04/09/2020     Social History     Socioeconomic History     Marital status:      Spouse name: Not on file     Number of children: Not on file     Years of education: Not on file     Highest education level: Not on file   Occupational History     Not on file   Tobacco Use     Smoking status: Never     Smokeless tobacco: Never   Substance and Sexual Activity     Alcohol use: Yes     Drug use: Not on file     Sexual activity: Not on file   Other Topics Concern     Not on file   Social History Narrative     Not on file     Social Drivers of " Health     Financial Resource Strain: Not on file   Food Insecurity: Not on file   Transportation Needs: Not on file   Physical Activity: Not on file   Stress: Not on file   Social Connections: Not on file   Interpersonal Safety: Not on file   Housing Stability: Not on file       Drug and lactation database from the [place] National Library of Medicine:  http://toxnet.nlm.nih.gov/cgi-bin/sis/htmlgen?LACT      B/P: Data Unavailable, T: Data Unavailable, P: Data Unavailable, R: Data Unavailable 0 lbs 0 oz  There were no vitals taken for this visit., There is no height or weight on file to calculate BMI.  Medications and Vitals not listed above were documented in the cart and reviewed by me.     Current Outpatient Medications   Medication Sig Dispense Refill     acetaminophen (TYLENOL) 500 MG tablet Take 2 tablets (1,000 mg) by mouth daily as needed for mild pain       calcium carbonate 600 mg-vitamin D 400 units (CALTRATE) 600-400 MG-UNIT per tablet Take 1 tablet by mouth 2 times daily 90 tablet 3     carbidopa-levodopa (SINEMET)  MG tablet 1 tablet at 8 am, 2 @ 3pm and 1 at 10 pm and 1 tab as needed. 360 tablet 11     DULoxetine (CYMBALTA) 60 MG capsule Take 60 mg by mouth every morning 90 capsule 3     estradiol (ESTRACE) 0.1 MG/GM vaginal cream Place 2 g vaginally twice a week 42.5 g 11     gabapentin (NEURONTIN) 300 MG capsule Take 600 mg by mouth 3 times daily 540 capsule 3     Glucosamine-Chondroit-Vit C-Mn (GLUCOSAMINE CHONDROITIN 1500 COMPLEX) CAPS Take 1 capsule by mouth daily       ibuprofen (ADVIL/MOTRIN) 200 MG tablet Take 2-3 tablets (400-600 mg) by mouth every 4 hours as needed for mild pain       MAGNESIUM GLYCINATE PO Take 100 mg by mouth 2 times daily       Melatonin 10 MG TABS tablet Take 10 mg by mouth at bedtime       meloxicam (MOBIC) 15 MG tablet Take 15 mg by mouth daily as needed       Probiotic Product (PROBIOTIC DAILY PO) Take 1 capsule by mouth daily ([employer] brand)        tiZANidine (ZANAFLEX) 4 MG tablet Take 4 mg by mouth daily as needed       vitamin D3 (CHOLECALCIFEROL) 50 mcg (2000 units) tablet Take 1 tablet by mouth daily           Damien Lujan MD      Again, thank you for allowing me to participate in the care of your patient.      Sincerely,    Damien Lujan MD  Electronically signed

## 2025-02-04 ENCOUNTER — TELEPHONE (OUTPATIENT)
Dept: NEUROLOGY | Facility: CLINIC | Age: 70
End: 2025-02-04
Payer: COMMERCIAL

## 2025-02-04 NOTE — TELEPHONE ENCOUNTER
Writer mailed spring symposium  information to patient at provider request.  AVIVA Capone., ALYSON ([facility])

## 2025-02-06 ENCOUNTER — THERAPY VISIT (OUTPATIENT)
Dept: PHYSICAL THERAPY | Facility: CLINIC | Age: 70
End: 2025-02-06
Attending: PSYCHIATRY & NEUROLOGY
Payer: COMMERCIAL

## 2025-02-06 DIAGNOSIS — G20.A1 PARKINSON'S DISEASE, UNSPECIFIED WHETHER DYSKINESIA PRESENT, UNSPECIFIED WHETHER MANIFESTATIONS FLUCTUATE (H): Primary | ICD-10-CM

## 2025-02-06 PROCEDURE — 97161 PT EVAL LOW COMPLEX 20 MIN: CPT | Mod: GP | Performed by: PHYSICAL THERAPIST

## 2025-02-06 PROCEDURE — 97112 NEUROMUSCULAR REEDUCATION: CPT | Mod: GP | Performed by: PHYSICAL THERAPIST

## 2025-02-06 NOTE — PROGRESS NOTES
02/06/25 1400   Signing Clinician's Name / Credentials   Signing clinician's name / credentials Myrtle Barker DPT NCS   Functional Gait Assessment (KAREL Haney., BENTLEY Watt, et al. (2004))   1. GAIT LEVEL SURFACE 3  (5.35 seconds and 10 steps, 5.19 seconds and 10 steps)   2. CHANGE IN GAIT SPEED 3  (4.16 seconds and 9 steps, 3.85 seconds and 8 steps)   3. GAIT WITH HORIZONTAL HEAD TURNS 2  (weaves, feels a bit wobbly)   4. GAIT WITH VERTICAL HEAD TURNS 3   5. GAIT AND PIVOT TURN 3   6. STEP OVER OBSTACLE 3   7. GAIT WITH NARROW BASE OF SUPPORT 1  (4-6 steps)   8. GAIT WITH EYES CLOSED 1  (10.62 seconds, she feels out of control, precarious)   9. AMBULATING BACKWARDS 2  (14.84 seconds and 21 steps)   10. STEPS 3   Total Functional Gait Assessment Score   TOTAL SCORE: (MAXIMUM SCORE 30) 24     Functional Gait Assessment (FGA): The FGA assesses postural stability during various walking tasks.   Gait assistive device used: None    Scores of <22 /30 have been correlated with predicting falls in community-dwelling older adults according to Medina & Mohinder 2010.   Scores of <18 /30 have been correlated with increased risk for falls in patients with Parkinsons Disease according to Petey Simpson Zhou et al 2014.  Minimal Detectable Change for patients with acute/chronic stroke = 4.2 according to Thisandro & Ritschel 2009  Minimal Detectable Change for patients with vestibular disorder = 8 according to Medina & Vines 2010    Assessment (rationale for performing, application to patient s function & care plan): low risk for falls wit high level testing. Has potential to improve on this test  (Minutes billed as physical performance test):  part of evaluation    Myrtle Barker DPT, MPT, NCS  Physical Therapist   Board Certified Neurologic Clinical Specialist     [facility]   [address] [place] [address]   [email or web address]  [email or web address]   Scheduling:  [phone]   Clinic: [phone] Mondays/Wednesdays/Thursdays   Fax: [phone]

## 2025-02-06 NOTE — PROGRESS NOTES
PHYSICAL THERAPY EVALUATION  Type of Visit: Evaluation       Fall Risk Screen:  Fall screen completed by: PT  Have you fallen 2 or more times in the past year?: Yes  Have you fallen and had an injury in the past year?: No  Is patient a fall risk?: No  Fall screen comments: missed a step, stupid thigns when distracted    Subjective         Presenting condition or subjective complaint: Weakening muscle strength due to Parkinson's    Same level of meds (4 times a day). Feeling like things are good. I am starting to have things change little by little. I have pain from my back surgeries. Generally the PD doesn't limit me its more the back. Balance is pretty good. Very few times in last couple of years needed meclazine. Cruise a couple weeks took some. Very seldom vertigo, it is very short lived. IF I quickly look up when standing or walking. She wasn't sure if it ever happened while seated. I had vertigo really bad years ago. Periodic muscle cramps, wakes me up at night. Has to think about if it is upper leg or lower leg. Once a week. Doesn't last more then seconds. Happening the past couple of months.   Date of onset: 02/03/25    Relevant medical history: Bladder or bowel problems; Dizziness; Incontinence; Parkinson s Disease   Dates & types of surgery: Cervical fusion (2 - 4), October 2011, Lumber fusion (2 - 5) February 2022    Prior diagnostic imaging/testing results:       Prior therapy history for the same diagnosis, illness or injury: No  Saw Aspen Collins in 2020 for 6 visits for Big and LOUD    Prior Level of Function  Transfers: Independent  Ambulation: Independent  ADL: Independent  IADL: Driving, Housekeeping, Laundry, Meal preparation, Medication management  (decreased due to back), does community mobility daily.  Walk almost every day, 40 minutes, walks with . Outdoors and at fitness center. [facility] periodically (once a week) and yoga exs from chiropractor. Summer swims.    Living Environment  Social support: With a significant other or spouse   Type of home: House; Basement   Stairs to enter the home: Yes 2 Is there a railing: No     Ramp: No   Stairs inside the home: Yes 14 Is there a railing: Yes     Help at home: None  Equipment owned:       Employment: No    Hobbies/Interests: Travel, reading, [place] activities, grandchildren, friends    Patient goals for therapy: Open jars/bottles on my own, lift heavier items (like filled grocery bags), lift young grandchildren    Pain assessment: Pain present Low back pain- sometimes has neck pain    Objective      Cognitive Status Examination  Orientation:    Level of Consciousness: Alert  Follows Commands and Answers Questions: 100% of the time  Personal Safety and Judgement: Intact  Memory: Intact    OBSERVATION: left side is her tremor side. Left leg affected by back  INTEGUMENTARY:   POSTURE:   PALPATION:   RANGE OF MOTION:  CROM WNLS for ext/flexion, rotation. Tighter for tilt. Very limited retraction. Arms AROM WNLS. Fingers WNLS and she can go wide/stretch with fingers. Good pronation/supination.   STRENGTH:   BED MOBILITY: Independent  TRANSFERS: Independent    GAIT:   Level of [place]: Independent  Assistive Device(s): None  Gait Deviations:  she has a bit of rib shift to right?? Has scoliosis, arm swing nicely , normal VICTORIANO  Gait Distance:   Stairs:   SPECIAL TESTS  Functional Gait Assessment (FGA) TOTAL SCORE: (MAXIMUM SCORE 30): 24    10 Meter Walk Test (Comfortable)  5.35 seconds and 10 steps, 5.19 seconds and 10 steps   10 Meter Walk Test (Fast)  4.16 seconds and 9 steps, 3.85 seconds and 8 steps     Timed Up and Go (TUG) - sec 7 seconds to left and 7.13 seconds to right  Tug cognitive (days of week backwards) 7.94 seconds  Tug cognitive (backwards coutning from 51 by 4's) 7.82 seconds   Single Leg Stance Right (sec) >20   Single Leg Stance Left (sec) >10   Modified CTSIB Conditions (sec) Cond 1: 30  Cond 2:   Cond 4:   Cond  5 :            30 Second Sit to Stand (reps/height) 17 reps with good control, started to feel a bit in back of thighs for pulling.   Mini-BESTest  TOTAL SCORE (out of 28): 23         SENSATION: Denies tingling and numbness  REFLEXES:   COORDINATION:   MUSCLE TONE:     Assessment & Plan   CLINICAL IMPRESSIONS  Medical Diagnosis: Parkinson's Disease    Treatment Diagnosis: impaired gati/balance and activity tolerance   Impression/Assessment: Patient is a 69 year old female with Parkinson's Disease.  She has had some falls/near falls. Balance testing today is pretty good, I think it can be improved.  She wants to get stronger. I think this is more of PD perception of movement problem than truly strength. WE will work on a HEP. We will also see about the dizziness she has occasionally. I will get her orders to see OT to do some specific hand tests for . The following significant findings have been identified: neck/ Decreased ROM/flexibility, Impaired balance, Impaired gait, Impaired muscle performance, and Dizziness. These impairments interfere with their ability to perform self care tasks, recreational activities, household chores, household mobility, and community mobility as compared to previous level of function.     Clinical Decision Making (Complexity):  Clinical Presentation: Stable/Uncomplicated  Clinical Presentation Rationale: based on medical and personal factors listed in PT evaluation  Clinical Decision Making (Complexity): Low complexity    PLAN OF CARE  Treatment Interventions:  Interventions: Gait Training, Neuromuscular Re-education, Therapeutic Exercise    Long Term Goals     PT Goal 1  Goal Identifier: HEP  Goal Description: Seh will be IND with a HEP of 4-5 exs daily for balance, neck and strength  Rationale: to maximize safety and independence with performance of ADLs and functional tasks;to maximize safety and independence within the home;to maximize safety and independence within the  community;to maximize safety and independence with self cares  Target Date: 04/30/25  PT Goal 2  Goal Identifier: backwards walking (high level balance)  Goal Description: jimena will be able to complete 10 meter test walking backwards in < 10 seconds and 13 steps or less  Rationale: to maximize safety and independence with performance of ADLs and functional tasks;to maximize safety and independence within the home;to maximize safety and independence within the community;to maximize safety and independence with self cares  Target Date: 04/30/25  PT Goal 3  Goal Identifier: groceries  Goal Description: She will report it is easier/better when trying to carry groceries as a result of BIG effort/recruitment and focus on task.  Rationale: to maximize safety and independence with performance of ADLs and functional tasks;to maximize safety and independence within the home;to maximize safety and independence within the community;to maximize safety and independence with self cares  Target Date: 04/30/25      Frequency of Treatment: 1 x every 2-4 weeks  Duration of Treatment: 12 weeks    Recommended Referrals to Other Professionals: Occupational Therapy for her hands  Education Assessment:   Learner/Method: Patient;Listening;Demonstration;Pictures/Video    Risks and benefits of evaluation/treatment have been explained.   Patient/Family/caregiver agrees with Plan of Care.     Evaluation Time:     PT Eval, Low Complexity Minutes (35194): 45  Signing Clinician: Rylee Barker, PT, NCS        [facility]                                                                                   OUTPATIENT PHYSICAL THERAPY      PLAN OF TREATMENT FOR OUTPATIENT REHABILITATION   Patient's Last Name, First Name, Ne Robertson YOB: 1955   Provider's Name   [facility]   Medical Record No.  [id number]     Onset Date: 02/03/25  Start of Care Date: 02/06/25      Medical Diagnosis:  Parkinson's Disease      PT Treatment Diagnosis:  impaired gati/balance and activity tolerance Plan of Treatment  Frequency/Duration: 1 x every 2-4 weeks/ 12 weeks    Certification date from 02/06/25 to 04/30/25         See note for plan of treatment details and functional goals     Rylee Barker, PT                         I CERTIFY THE NEED FOR THESE SERVICES FURNISHED UNDER        THIS PLAN OF TREATMENT AND WHILE UNDER MY CARE     (Physician attestation of this document indicates review and certification of the therapy plan).              Referring Provider:  Damien Lujan    Initial Assessment  See Epic Evaluation- Start of Care Date: 02/06/25

## 2025-02-06 NOTE — PROGRESS NOTES
02/06/25 1400   Signing Clinician's Name / Credentials   Signing Clinician's Name / Credentials Myrtle Mj DPT NCS   ANTICIPATORY BALANCE   1. SIT TO STAND 2   2. RISE TO TOES 2   3. STAND ON ONE LEG (LEFT) 2   3. STAND ON ONE LEG (RIGHT) 1  (this is the side for tremors and affected by back more)   Stand on one leg subscore 1   REACTIVE POSTURAL CONTROL   4. COMPENSATORY STEPPING CORRECTION- FORWARD 1   5. COMPENSATORY STEPPING CORRECTION- BACKWARD 1   6. COMPENSATORY STEPPING CORRECTION- LATERAL (LEFT) 2   6. COMPENSATORY STEPPING CORRECTION- LATERAL (RIGHT) 2   Compensatory stepping correction lateral subscore 2   SENSORY ORIENTATION   7. STANCE (FEET TOGETHER); EYES OPEN, FIRM SURFACE 2   8. STANCE (FEET TOGETHER); EYES CLOSED, FOAM SURFACE 1  (she knows where she is in space, took multipel treis to get to 30 seconds.)   9. INCLINE- EYES CLOSED 2   DYNAMIC GAIT   10. CHANGE IN GAIT SPEED 2   11. WALK WITH HEAD TURNS - HORIZONTAL 1  (weaves more then slowing down)   12. WALK WITH PIVOT TURNS 2   13. STEP OVER OBSTACLES 2   14. TIMED UP & GO WITH DUAL TASK [3 METER WALK] 2  (numbers are hard for her so tested a coupel different ways)   SCORING   ANTICIPATORY SUB SCORE: / 6 5   REACTIVE POSTURAL CONTROL SUB SCORE: / 6 4   SENSORY ORIENTATION SUB SCORE: / 6 5   DYNAMIC GAIT SUB SCORE: /10 9   TOTAL SCORE (out of 28) 23     Mini-BESTest: The Mini-BESTest assesses reactive postural, anticipatory, sensory orientation, and dynamic gait balance.  Gait assistive device used: none     Patient Score: 23/28  Scores of <17.5/28 have identified people with chronic stroke that have a history of falling according to Maryjo et al 2013.   Scores of <22/28 are predictive of increased fall risk for persons with Parkinson's Disease according to Daniel HENRYR et al 2020.    Minimally Detectable Change (MDC) for persons with Parkinson's Disease 5.52pts per Naila et al 2011.  Minimally Clinically Significant Difference (MCID) for persons  with Balance Disorders: 4pts according to David et al 2013.    Assessment (rationale for performing, application to patient s function & care plan): low risk of falls   Minutes billed as physical performance test: part of evaluation     Myrtle Barker DPT, MPT, NCS  Physical Therapist   Board Certified Neurologic Clinical Specialist     [facility]   [address] [place] [address]   [email or web address]   [phone]   Clinic: [phone] //   Fax: [phone]

## 2025-02-20 ENCOUNTER — THERAPY VISIT (OUTPATIENT)
Dept: PHYSICAL THERAPY | Facility: CLINIC | Age: 70
End: 2025-02-20
Attending: PSYCHIATRY & NEUROLOGY
Payer: COMMERCIAL

## 2025-02-20 DIAGNOSIS — G20.A1 PARKINSON'S DISEASE, UNSPECIFIED WHETHER DYSKINESIA PRESENT, UNSPECIFIED WHETHER MANIFESTATIONS FLUCTUATE (H): Primary | ICD-10-CM

## 2025-02-20 PROCEDURE — 97112 NEUROMUSCULAR REEDUCATION: CPT | Mod: GP | Performed by: PHYSICAL THERAPIST

## 2025-02-27 ENCOUNTER — THERAPY VISIT (OUTPATIENT)
Dept: OCCUPATIONAL THERAPY | Facility: CLINIC | Age: 70
End: 2025-02-27
Attending: PSYCHIATRY & NEUROLOGY
Payer: COMMERCIAL

## 2025-02-27 DIAGNOSIS — G20.A1 PARKINSON'S DISEASE, UNSPECIFIED WHETHER DYSKINESIA PRESENT, UNSPECIFIED WHETHER MANIFESTATIONS FLUCTUATE (H): Primary | ICD-10-CM

## 2025-02-27 PROCEDURE — 97110 THERAPEUTIC EXERCISES: CPT | Mod: GO | Performed by: OCCUPATIONAL THERAPY ASSISTANT

## 2025-02-27 PROCEDURE — 97165 OT EVAL LOW COMPLEX 30 MIN: CPT | Mod: GO | Performed by: OCCUPATIONAL THERAPY ASSISTANT

## 2025-02-27 NOTE — PROGRESS NOTES
OCCUPATIONAL THERAPY EVALUATION  Type of Visit: Evaluation        Fall Risk Screen:  Fall screen completed by: OT  Have you fallen 2 or more times in the past year?: Yes  Have you fallen and had an injury in the past year?: No  Is patient a fall risk?: Yes; Department fall risk interventions implemented  Fall screen comments: seeing PT already    Subjective        Presenting condition or subjective complaint: Weakening muscle strength due to Parkinson's  Date of onset: 02/06/25 (referral date)    Relevant medical history: Bladder or bowel problems; Dizziness; Incontinence; Parkinson s Disease   Dates & types of surgery: Cervical fusion (2 - 4), October 2011, Lumber fusion (2 - 5) February 2022    Prior diagnostic imaging/testing results:       Prior therapy history for the same diagnosis, illness or injury: No      Prior Level of Function  Transfers: Independent  Ambulation: Independent  ADL: Independent  IADL: Driving, Finances, Housekeeping, Laundry, Meal preparation, Medication management    Living Environment  Social support: With a significant other or spouse   Type of home: House; Basement   Stairs to enter the home: Yes 2 Is there a railing: No     Ramp: No   Stairs inside the home: Yes 14 Is there a railing: Yes     Help at home: None  Equipment owned:       Employment: No    Hobbies/Interests: Travel, reading, [place] activities, grandchildren, friends    Patient goals for therapy: Open jars/bottles on my own, lift heavier items (like filled grocery bags), lift young grandchildren    Pain assessment: Pain present  Lowe back pain following lumbar fusion     Objective     Cognitive Status Examination  Orientation: Oriented to person, place and time   Level of Consciousness: Alert, Anxious  Follows Commands and Answers Questions: 100% of the time  Personal Safety and Judgement: Intact, Impulsive  Memory: Intact  Attention: Quiet environment required, Sustained attention impaired  Organization/Problem Solving: No  deficits identified  Executive Function: No deficits identified    VISUAL SKILLS  Visual Acuity: No deficits identified, had vision surgery  Visual Field: Appears normal  Visual Attention: Appears normal  Oculomotor: grossly within functional limits, saccades and smooth pursuits     SENSATION: UE Sensation WNL, intermittently reports numbness in hands, but not at eval     POSTURE: WFL  RANGE OF MOTION: UE AROM WFL, grossly within functional limits; R shoulder held in elevation; history of scoliosis  STRENGTH:  to be further assessed -  strength R: 38.33 L: 37.5  MUSCLE TONE: WFL  COORDINATION: Left Hand, Nine Hole Peg Test (sec): 18.68  Right Hand, Nine Hole Peg Test (sec): 23.36  BALANCE:  Seeing PT for balance     FUNCTIONAL MOBILITY  Assistive Device(s): None      BED MOBILITY: WFL    TRANSFERS: WFL    BATHING: WFL  Equipment:  n/a    UPPER BODY DRESSING: WFL    LOWER BODY DRESSING:  occasional assist with sock.shoes  Equipment: Long-handled shoe horn, Reacher    TOILETING: WFL    GROOMING: WF    EATING/SELF FEEDING: [facility]     ACTIVITY TOLERANCE: Patient reports she is not able to walk as far as she used to- cites lower back pain and fatigue, can't stand for as long due to lower back pain.     INSTRUMENTAL ACTIVITIES OF DAILY LIVING (IADL): patient is functionally independent   Meal Planning/Prep: modified independent   Home/Financial Management: functionally independent   Communication/Computer Use: functionally independent/at baseline   Community Mobility: drives  Care of Others:- takes care of grandchildren- youngest is five and the oldest is in college. Lifting the 5 year old is a challenge       Assessment & Plan   CLINICAL IMPRESSIONS  Medical Diagnosis: Dx: G20.A1 (ICD-10-CM) - Parkinson's disease, unspecified whether dyskinesia present, unspecified whether manifestations fluctuate (H)    Treatment Diagnosis: Impaired IADL, ADL, weakness, infoordination    Impression/Assessment: Pt is a 69 year old  female presenting to Occupational Therapy due to /pinch weakness, incoordination due to parkinson's disease. Weakness affects ADL and IADL including, but not limited to: carrying groceries, lifting 5 year old grandchild, opening bottles, clipping fingernails. Patient amenable for both remediation and compensatory strategies to preserve independence in the setting of degenerative disease. The following significant findings have been identified: Impaired coordination, Impaired motor control, and Impaired strength.  These identified deficits interfere with their ability to perform self care tasks and care of others as compared to previous level of function.     Clinical Decision Making (Complexity):  Assessment of Occupational Performance: 1-3 Performance Deficits  Occupational Performance Limitations: hygiene and grooming and care of others  Clinical Decision Making (Complexity): Low complexity    PLAN OF CARE  Treatment Interventions:  Interventions: Self-Care/Home Management, Therapeutic Activity, Therapeutic Exercise, Neuromuscular Re-education    Long Term Goals   OT Goal 1  Goal Identifier: HEP  Goal Description: The patient will be independent with HEP targeting /pinch strength, BUE strength, and fine motor coordination  Rationale: In order to maximize safety and independence with ADL/IADLs  Goal Progress: Initiated training in this area; issued handouts, yellow foam block, yellow t putty, yellow theraband      Frequency of Treatment: 1x/wk x 6 weeks with ability to add or taper as clinically indicated  Duration of Treatment: up to 90 days for scheduling     Recommended Referrals to Other Professionals:   Education Assessment: Learner/Method: Patient  Education Comments: acknowledges understanding     Risks and benefits of evaluation/treatment have been explained.   Patient/Family/caregiver agrees with Plan of Care.     Evaluation Time:    OT Eval, Low Complexity Minutes (38663): 30      Signing  Clinician: Cindy Arnold OT        [facility]                                                                                   OUTPATIENT OCCUPATIONAL THERAPY      PLAN OF TREATMENT FOR OUTPATIENT REHABILITATION   Patient's Last Name, First Name, Ne Robertson YOB: 1955   Provider's Name   [facility]   Medical Record No.  [id number]     Onset Date: 02/06/25 (referral date) Start of Care Date: 02/27/25     Medical Diagnosis:  Dx: G20.A1 (ICD-10-CM) - Parkinson's disease, unspecified whether dyskinesia present, unspecified whether manifestations fluctuate (H)      OT Treatment Diagnosis:  Impaired IADL, ADL, weakness, infoordination Plan of Treatment  Frequency/Duration:1x/wk x 6 weeks with ability to add or taper as clinically indicated/up to 90 days for scheduling    Certification date from 02/27/25   To 05/22/25        See note for plan of treatment details and functional goals     Cindy Arnold OT                         I CERTIFY THE NEED FOR THESE SERVICES FURNISHED UNDER        THIS PLAN OF TREATMENT AND WHILE UNDER MY CARE     (Physician attestation of this document indicates review and certification of the therapy plan).              Referring Provider:  Damien Lujan    Initial Assessment  See Epic Evaluation- 02/27/25

## 2025-03-18 ENCOUNTER — THERAPY VISIT (OUTPATIENT)
Dept: OCCUPATIONAL THERAPY | Facility: CLINIC | Age: 70
End: 2025-03-18
Attending: PSYCHIATRY & NEUROLOGY
Payer: COMMERCIAL

## 2025-03-18 ENCOUNTER — THERAPY VISIT (OUTPATIENT)
Dept: PHYSICAL THERAPY | Facility: CLINIC | Age: 70
End: 2025-03-18
Attending: PSYCHIATRY & NEUROLOGY
Payer: COMMERCIAL

## 2025-03-18 DIAGNOSIS — G20.A1 PARKINSON'S DISEASE, UNSPECIFIED WHETHER DYSKINESIA PRESENT, UNSPECIFIED WHETHER MANIFESTATIONS FLUCTUATE (H): Primary | ICD-10-CM

## 2025-03-18 PROCEDURE — 97535 SELF CARE MNGMENT TRAINING: CPT | Mod: GO

## 2025-03-18 PROCEDURE — 97110 THERAPEUTIC EXERCISES: CPT | Mod: GO

## 2025-03-18 PROCEDURE — 97110 THERAPEUTIC EXERCISES: CPT | Mod: GP | Performed by: PHYSICAL THERAPIST

## 2025-03-18 PROCEDURE — 97112 NEUROMUSCULAR REEDUCATION: CPT | Mod: GP | Performed by: PHYSICAL THERAPIST

## 2025-03-25 ENCOUNTER — THERAPY VISIT (OUTPATIENT)
Dept: OCCUPATIONAL THERAPY | Facility: CLINIC | Age: 70
End: 2025-03-25
Attending: PSYCHIATRY & NEUROLOGY
Payer: COMMERCIAL

## 2025-03-25 DIAGNOSIS — G20.A1 PARKINSON'S DISEASE, UNSPECIFIED WHETHER DYSKINESIA PRESENT, UNSPECIFIED WHETHER MANIFESTATIONS FLUCTUATE (H): Primary | ICD-10-CM

## 2025-03-25 PROCEDURE — 97110 THERAPEUTIC EXERCISES: CPT | Mod: GO

## 2025-03-25 PROCEDURE — 97530 THERAPEUTIC ACTIVITIES: CPT | Mod: GO

## 2025-04-03 ENCOUNTER — THERAPY VISIT (OUTPATIENT)
Dept: PHYSICAL THERAPY | Facility: CLINIC | Age: 70
End: 2025-04-03
Attending: PSYCHIATRY & NEUROLOGY
Payer: COMMERCIAL

## 2025-04-03 DIAGNOSIS — G20.A1 PARKINSON'S DISEASE, UNSPECIFIED WHETHER DYSKINESIA PRESENT, UNSPECIFIED WHETHER MANIFESTATIONS FLUCTUATE (H): Primary | ICD-10-CM

## 2025-04-03 PROCEDURE — 97112 NEUROMUSCULAR REEDUCATION: CPT | Mod: GP | Performed by: PHYSICAL THERAPIST

## 2025-04-03 PROCEDURE — 97110 THERAPEUTIC EXERCISES: CPT | Mod: GP | Performed by: PHYSICAL THERAPIST

## 2025-04-03 PROCEDURE — 97750 PHYSICAL PERFORMANCE TEST: CPT | Mod: GP | Performed by: PHYSICAL THERAPIST

## 2025-04-04 NOTE — PROGRESS NOTES
25 0800   Signing Clinician's Name / Credentials   Signing clinician's name / credentials Myrtle TAMEZT NCS   Functional Gait Assessment (KAREL Haney, BENTLEY Watt, et al. (2004))   1. GAIT LEVEL SURFACE 3  (5.47 seconds and 11 steps)   2. CHANGE IN GAIT SPEED 3  (4.47 seconds and 10 steps)   3. GAIT WITH HORIZONTAL HEAD TURNS 3  (still doesnt feel automatic but looks better)   4. GAIT WITH VERTICAL HEAD TURNS 3   5. GAIT AND PIVOT TURN 3   6. STEP OVER OBSTACLE 3   7. GAIT WITH NARROW BASE OF SUPPORT 3  (really has to concentrate)   8. GAIT WITH EYES CLOSED 2  (9.75 seconds)   9. AMBULATING BACKWARDS 3  (9.56 seconds and 18 steps)   10. STEPS 3   Total Functional Gait Assessment Score   TOTAL SCORE: (MAXIMUM SCORE 30) 29     Functional Gait Assessment (FGA): The FGA assesses postural stability during various walking tasks.   Gait assistive device used: None    Scores of <22 /30 have been correlated with predicting falls in community-dwelling older adults according to Medina & Mohinder 2010.   Scores of <18 /30 have been correlated with increased risk for falls in patients with Parkinsons Disease according to Petey Simpson Chaparro et al 2014.  Minimal Detectable Change for patients with acute/chronic stroke = 4.2 according to Thisandro & Ritschel 2009  Minimal Detectable Change for patients with vestibular disorder = 8 according to Medina & Mohinder 2010    Assessment (rationale for performing, application to patient s function & care plan): nice improvement on dynamic (high level) gait tests  (Minutes billed as physical performance test):  12    Myrtle Barker DPT, MPT, NCS  Physical Therapist   Board Certified Neurologic Clinical Specialist     [facility]   [address] [place] [address]   [email or web address]  [email or web address]   [phone]   Clinic: [phone] //   Fax: [phone]

## 2025-04-14 ENCOUNTER — VIRTUAL VISIT (OUTPATIENT)
Dept: PHARMACY | Facility: CLINIC | Age: 70
End: 2025-04-14
Attending: PSYCHIATRY & NEUROLOGY
Payer: COMMERCIAL

## 2025-04-14 DIAGNOSIS — G20.A1 PARKINSON'S DISEASE, UNSPECIFIED WHETHER DYSKINESIA PRESENT, UNSPECIFIED WHETHER MANIFESTATIONS FLUCTUATE (H): Primary | ICD-10-CM

## 2025-04-14 DIAGNOSIS — R32 URINARY INCONTINENCE, UNSPECIFIED TYPE: ICD-10-CM

## 2025-04-14 DIAGNOSIS — Z78.9 TAKES DIETARY SUPPLEMENTS: ICD-10-CM

## 2025-04-14 DIAGNOSIS — R52 PAIN: ICD-10-CM

## 2025-04-14 NOTE — PATIENT INSTRUCTIONS
"Recommendations from today's MTM visit:                                                      Try to take carbidopa-levodopa at least 1 hour before or 2 hours after a high protein meal for best absorption/effect   You may try Biotene products to help with dry mouth, or sour candies. If this causes too much saliva accumulation in your mouth, there are different treatments we use for drooling/excessive saliva.   Your headaches might be related to tightness in your neck. You can try Tylenol for the headaches or tizanidine which is a muscle relaxer, but please be aware tizanidine may make you feel drowsy or dizzy.  Call the clinic to have you magnesium blood test done. If your magnesium level isn't too high, you can increase your magnesium dose (add a dose before bed).     Follow-up: 6 months or sooner if needed     It was great speaking with you today.  I value your experience and would be very thankful for your time in providing feedback in our clinic survey. In the next few days, you may receive an email or text message from [employer] with a link to a survey related to your  clinical pharmacist.\"     To schedule another MTM appointment, please call the clinic directly or you may call the MTM scheduling line at [phone].    My Clinical Pharmacist's contact information:                                                      Please feel free to contact me with any questions or concerns you have.      Pat Perez, Pharm.D.  Medication Therapy Management Pharmacist  [facility]     "

## 2025-04-14 NOTE — PROGRESS NOTES
Medication Therapy Management (MTM) Encounter    ASSESSMENT:                            Medication Adherence/Access: No issues identified.    Parkinson's Disease  Patient advised to take carbidopa-levodopa apart from meals for optimal benefit. Dr. Lujan ordered a magnesium test and she will schedule to have this completed soon; discussed her magnesium dose could potentially be increased if needed. For dry mouth, she was advised to use Biotene products.      Pain  Patient advised to use Tylenol first line for the headaches as they could be related to tightness in her neck/back. She could also periodically use the tizanidine.      Incontinence  Stable      Vitamins/Supplements  Stable     PLAN:                            Try to take carbidopa-levodopa at least 1 hour before or 2 hours after a high protein meal for best absorption/effect   You may try Biotene products to help with dry mouth, or sour candies. If this causes too much saliva accumulation in your mouth, there are different treatments we use for drooling/excessive saliva.   Your headaches might be related to tightness in your neck. You can try Tylenol for the headaches or tizanidine which is a muscle relaxer, but please be aware tizanidine may make you feel drowsy or dizzy.  Call the clinic to have you magnesium blood test done. If your magnesium level isn't too high, you can increase your magnesium dose (add a dose before bed).     Follow-up: 6 months or sooner if needed     SUBJECTIVE/OBJECTIVE:                          Ne Padilla is a 69 year old female seen for a follow-up visit.       Reason for visit: follow up on medications.    Allergies/ADRs: Reviewed in chart  Past Medical History: Reviewed in chart  Tobacco: She reports that she has never smoked. She has never used smokeless tobacco.  Alcohol: 1-3 beverages / week  Social: upcoming trip to [place] to visit her grandson- will be going to [place] and [place]     Medication Adherence/Access: no  "issues reported.    Parkinson's Disease  - carbidopa-levodopa  mg, 1 tablet at 8 am, 2 tablets at 3 pm, 1 tablet at 10 pm + 1 tablet as needed   - melatonin 10 mg at 10 pm  (going to bed at 11-11:30 pm)  Patients states she is noticing some worsening of symptoms over the last 1-2 months. She has some more shakiness, especially when she feels things are \"intense\" or require focus. At night she is having more \"electrical impulses\" in her legs, which is keeping her awake for a while before she falls asleep. She is wondering if she can try a higher dose of magnesium to help with this.   Patient states she is noticing more dry mouth. Usually starts in the afternoon but is worse other days. Also notes sticky saliva in her mouth.    patient wanted to discuss timing of carbidopa-levodopa around meals as well.     Pain  - Gabapentin 600 mg 3 times/day   - duloxetine 60 mg (8 am)  - Tylenol as needed   - ibuprofen as needed (not as often as Tylenol, hard on stomach)  - meloxicam as needed (rare use, not on same day as ibuprofen)   - tizanidine as needed (hasn't used recently)   Patient states she has been getting headaches, most often in the late afternoon and through the evening. Not every night but often enough that she notices it now. She is wondering if the headaches could be due to stress. She also has a history of a few surgeries on her back/spine and states her neck continues to be sore. She takes Tylenol about half the days. Ibuprofen she uses less often. If pain is severe she will take meloxicam.      Incontinence  - estradiol cream vaginally twice week   Patient states she had a vaginal sling procedure and this made a huge difference, along with the estradiol.      Vitamins/Supplements  - calcium-vitamin D3 twice daily (noon and 10 pm)  - vitamin D3 2000 units daily (noon)  - glucosamine 1 tablet daily (noon)  - magnesium glycinate 210 mg daily (morning)    - probiotic daily       Today's Vitals: There were no " vitals taken for this visit.  ----------------    I spent 29 minutes with this patient today. All changes were made via collaborative practice agreement with Dr. Lujan.     A summary of these recommendations was sent via [employer].    Pat Perez, Pharm.D.  Medication Therapy Management Pharmacist  [facility]    Telemedicine Visit Details  The patient's medications can be safely assessed via a telemedicine encounter.  Type of service:  Telephone visit  Originating Location (pt. Location): Home    Distant Location (provider location):  Off-site  Start Time:  11:00 AM  End Time: 11:29 AM     Medication Therapy Recommendations  No medication therapy recommendations to display

## 2025-04-15 ENCOUNTER — LAB (OUTPATIENT)
Dept: LAB | Facility: CLINIC | Age: 70
End: 2025-04-15
Payer: COMMERCIAL

## 2025-04-15 ENCOUNTER — THERAPY VISIT (OUTPATIENT)
Dept: OCCUPATIONAL THERAPY | Facility: CLINIC | Age: 70
End: 2025-04-15
Attending: PSYCHIATRY & NEUROLOGY
Payer: COMMERCIAL

## 2025-04-15 DIAGNOSIS — G20.A1 PARKINSON'S DISEASE, UNSPECIFIED WHETHER DYSKINESIA PRESENT, UNSPECIFIED WHETHER MANIFESTATIONS FLUCTUATE (H): Primary | ICD-10-CM

## 2025-04-15 DIAGNOSIS — E83.40 DISORDER OF MAGNESIUM METABOLISM: ICD-10-CM

## 2025-04-15 DIAGNOSIS — R25.2 BILATERAL LEG CRAMPS: ICD-10-CM

## 2025-04-15 LAB — MAGNESIUM SERPL-MCNC: 2.1 MG/DL (ref 1.7–2.3)

## 2025-04-15 PROCEDURE — 97110 THERAPEUTIC EXERCISES: CPT | Mod: GO

## 2025-04-16 NOTE — PROGRESS NOTES
04/15/25 0500   Appointment Info   Treating Provider Joy Rodriguez OTR/L   Visits Used 4   Medical Diagnosis Dx: G20.A1 (ICD-10-CM) - Parkinson's disease, unspecified whether dyskinesia present, unspecified whether manifestations fluctuate (H)   OT Tx Diagnosis Impaired IADL, ADL, weakness, infoordination   Other pertinent information cervical and lumbar fusions- frequent LBP   Progress Note/Certification   Start Of Care Date 02/27/25   Onset of Illness/Injury or Date of Surgery 02/06/25   Therapy Frequency 1x/wk x 6 weeks with ability to add or taper as clinically indicated   Predicted Duration up to 90 days for scheduling   Certification date from 02/27/25   Certification date to 05/22/25   OT Goal 1   Goal Identifier HEP   Goal Description Patient will demonstrate independence with functional high amplitude HEP to maximize ROM/function, reduce rigidity, and promote automaticity and amplitude of movement for improved ease, efficiency, and IND in ADL/IADL's and mobility (household chores putting groceries away, dressing)   Goal Progress goal met   Target Date 05/22/25   Date Met 04/15/25   OT Goal 2   Goal Identifier Handwriting   Target Date 05/22/25   Goal Description Pt will verbalize and demonstrate understanding of attentional, cognitive sensory, and environments strategies to improve handwriting size and legibilty   Date Met 04/15/25   Goal Progress Goal met   Subjective Report   Subjective Report Pt states her exercises are going well and would like to review to ensure she is doing them correctly.   Objective Measure 1   Objective Measure 9HP   Details R: 23.36 L 18.68   Objective Measure 2   Objective Measure    Details R: 38.33 L 37.5   Objective Measure 3   Objective Measure Pinch (3 jaw adelia)   Details R 10 L 10   Objective Measure 4   Objective Measure SLS   Details +10 seconds right and left foot   Objective Measure 5   Objective Measure 5xSTS   Details 7.20 seconds (>16 indicates greater  risk for falls)   Objective Measure 6   Objective Measure Donning/doffing coat   Details doffin.57 sec; donin.35 seconds   Therapeutic Procedure/Exercise   Therapeutic Procedure: strength, endurance, ROM, flexibillity minutes (21278) 40   Ther Proc 1 - Details Skilled review and reinforcement of proper exercise prinicples for arm/hand exercise to reduce stiffness/rigidity and maximize dexterity required for ADL/IADL's using Dancing Hands HEP:   1.) Rowing the boat (stretch forward and pull back)  2.) Finger flicks (arms extended in front of you and flick away)  3.) Pick fruit (reach above head at a diagonal-look at your hand)  4.) Finger Taps (touch thumb to each finger and open big in between each rep) 5) Rock paper scissors snap 6.) Flapjacks (slap knees palm down, turn palm up big)  7.) Twist (Wide feet, twist and hit the inside of your opposite knee)  8.) Hamburger (palm up/down hand claps)  9.) Motorcycle (wrist extension)  Pt received modeling/shaping for each and verbalizes and demo's understanding.. Cues required for increased amplitude/ROM for end ranges. Skilled facilitation and training of PWR! Moves in standing to optimize automaticity of movement, mitigate progression of symptoms (reduce stiffness, improve posture) and promote amplitude of movement to improve ease, efficiency, and safety during ADL/IADL (reaching, dressing, grandchildren, kitchen tasks, laundry and functional mobility). Pt performed PWR! in Standing PWR! Up targeting antigravity extension and PWR! Rock with emphasis on weight shift, PWR! Twist targeting axial mobility, and PWR! Step targeting transitions, Pt performs 10 reps for each. Modeling and shaping provded with cues for posture and high amplitude of movement provided. Pt feels confident implementing and verbalizes how to go onto PWR! moves website for video to guide exercises at home. Pt feel confident implementing learned exercise and feels confident to discharge.  Recommend follow up ~6 months-1 year to reassess ADL/IADL and functional mobility and review/modify PD-specific exercises as needed   Patient Response/Progress Pt receptive, motivated to particpiate   Education   Learner/Method Patient;Listening;Demonstration   Plan   Home program Dandcing hands, PWR! Moves Standing, yellow theraband   Updates to plan of care Pt feel confident implementing learned PD-specific and UE/hand exercise to improve posture, reduce rigidity, and improve automaticity and amplitude of movement required for ADL/IADL's and funcitonal mobility and feels confident to discharge. Recommend follow up ~6 months-1 year to reassess ADL/IADL and functional mobility and review/modify PD-specific exercises as needed   Total Session Time   Timed Code Treatment Minutes 40   Total Treatment Time (sum of timed and untimed services) 40         DISCHARGE  Reason for Discharge: Patient has met all goals.    Equipment Issued: n/a    Discharge Plan: Patient to continue home program.    Referring Provider:  Damien Lujan

## 2025-07-12 ENCOUNTER — HEALTH MAINTENANCE LETTER (OUTPATIENT)
Age: 70
End: 2025-07-12

## 2025-08-04 ENCOUNTER — OFFICE VISIT (OUTPATIENT)
Dept: NEUROLOGY | Facility: CLINIC | Age: 70
End: 2025-08-04
Payer: COMMERCIAL

## 2025-08-04 VITALS
SYSTOLIC BLOOD PRESSURE: 106 MMHG | WEIGHT: 109 LBS | BODY MASS INDEX: 20.58 KG/M2 | HEART RATE: 69 BPM | DIASTOLIC BLOOD PRESSURE: 63 MMHG | HEIGHT: 61 IN

## 2025-08-04 DIAGNOSIS — G20.C PARKINSONISM, UNSPECIFIED PARKINSONISM TYPE (H): Primary | ICD-10-CM

## 2025-08-04 PROCEDURE — 99214 OFFICE O/P EST MOD 30 MIN: CPT | Performed by: PSYCHIATRY & NEUROLOGY

## 2025-08-04 PROCEDURE — 3078F DIAST BP <80 MM HG: CPT | Performed by: PSYCHIATRY & NEUROLOGY

## 2025-08-04 PROCEDURE — 3074F SYST BP LT 130 MM HG: CPT | Performed by: PSYCHIATRY & NEUROLOGY

## 2025-08-04 PROCEDURE — G2211 COMPLEX E/M VISIT ADD ON: HCPCS | Performed by: PSYCHIATRY & NEUROLOGY

## 2025-08-04 RX ORDER — CARBIDOPA AND LEVODOPA 25; 100 MG/1; MG/1
TABLET ORAL
Qty: 450 TABLET | Refills: 11 | Status: SHIPPED | OUTPATIENT
Start: 2025-08-04

## (undated) DEVICE — SUCTION TIP YANKAUER W/O VENT K86

## (undated) DEVICE — DRAPE GYN/UROLOGY FLUID POUCH TUR 29455

## (undated) DEVICE — RETR ELASTIC STAYS 3311-1G

## (undated) DEVICE — SYR EAR BULB 3OZ 0035830

## (undated) DEVICE — SOL WATER IRRIG 1000ML BOTTLE 07139-09

## (undated) DEVICE — CATH FOLEY 18FR 5ML SIL165818

## (undated) DEVICE — SU VICRYL 2-0 SH 27" J317H

## (undated) DEVICE — PREP SKIN SCRUB TRAY 4461A

## (undated) DEVICE — SOL WATER INJ 1000ML BAG 07990-09

## (undated) DEVICE — NDL 19GA 1.5"

## (undated) DEVICE — RETR RING LONE STAR 28.3X18.3CM W/CATH CLIPSX2 3308G

## (undated) DEVICE — GLOVE BIOGEL PI MICRO INDICATOR UNDERGLOVE SZ 7.5 48975

## (undated) DEVICE — TUBING IRRIG TUR Y TYPE 96" LF 6543-01

## (undated) DEVICE — PACK MINOR SBA15MIFSE

## (undated) DEVICE — GLOVE BIOGEL PI ULTRATOUCH G SZ 7.0 42170

## (undated) DEVICE — DECANTER TRANSFER DEVICE 2008S

## (undated) DEVICE — SYR 10ML LL W/O NDL

## (undated) DEVICE — PAD PERI W/WINGS 1580A

## (undated) RX ORDER — FENTANYL CITRATE 50 UG/ML
INJECTION, SOLUTION INTRAMUSCULAR; INTRAVENOUS
Status: DISPENSED
Start: 2024-08-12

## (undated) RX ORDER — GENTAMICIN 40 MG/ML
INJECTION, SOLUTION INTRAMUSCULAR; INTRAVENOUS
Status: DISPENSED
Start: 2024-08-12

## (undated) RX ORDER — PROPOFOL 10 MG/ML
INJECTION, EMULSION INTRAVENOUS
Status: DISPENSED
Start: 2024-08-12

## (undated) RX ORDER — CLINDAMYCIN PHOSPHATE 900 MG/50ML
INJECTION, SOLUTION INTRAVENOUS
Status: DISPENSED
Start: 2024-08-12

## (undated) RX ORDER — ACETAMINOPHEN 325 MG/1
TABLET ORAL
Status: DISPENSED
Start: 2024-08-12